# Patient Record
Sex: MALE | Race: BLACK OR AFRICAN AMERICAN | NOT HISPANIC OR LATINO | URBAN - METROPOLITAN AREA
[De-identification: names, ages, dates, MRNs, and addresses within clinical notes are randomized per-mention and may not be internally consistent; named-entity substitution may affect disease eponyms.]

---

## 2017-06-10 ENCOUNTER — EMERGENCY (EMERGENCY)
Facility: HOSPITAL | Age: 22
LOS: 1 days | Discharge: HOME | End: 2017-06-10
Attending: EMERGENCY MEDICINE
Payer: MEDICAID

## 2017-06-10 VITALS
TEMPERATURE: 97 F | SYSTOLIC BLOOD PRESSURE: 112 MMHG | HEART RATE: 96 BPM | HEIGHT: 76 IN | WEIGHT: 225.09 LBS | OXYGEN SATURATION: 98 % | RESPIRATION RATE: 16 BRPM | DIASTOLIC BLOOD PRESSURE: 68 MMHG

## 2017-06-10 VITALS
RESPIRATION RATE: 16 BRPM | DIASTOLIC BLOOD PRESSURE: 87 MMHG | HEART RATE: 68 BPM | TEMPERATURE: 99 F | SYSTOLIC BLOOD PRESSURE: 149 MMHG | OXYGEN SATURATION: 99 %

## 2017-06-10 DIAGNOSIS — G40.909 EPILEPSY, UNSPECIFIED, NOT INTRACTABLE, WITHOUT STATUS EPILEPTICUS: ICD-10-CM

## 2017-06-10 DIAGNOSIS — Z79.899 OTHER LONG TERM (CURRENT) DRUG THERAPY: ICD-10-CM

## 2017-06-10 DIAGNOSIS — R56.9 UNSPECIFIED CONVULSIONS: ICD-10-CM

## 2017-06-10 LAB
ALBUMIN SERPL ELPH-MCNC: 4.9 G/DL — SIGNIFICANT CHANGE UP (ref 3.3–5.2)
ALP SERPL-CCNC: 110 U/L — SIGNIFICANT CHANGE UP (ref 40–120)
ALT FLD-CCNC: 37 U/L — SIGNIFICANT CHANGE UP
AMPHET UR-MCNC: NEGATIVE — SIGNIFICANT CHANGE UP
ANION GAP SERPL CALC-SCNC: 15 MMOL/L — SIGNIFICANT CHANGE UP (ref 5–17)
AST SERPL-CCNC: 34 U/L — SIGNIFICANT CHANGE UP
BARBITURATES UR SCN-MCNC: NEGATIVE — SIGNIFICANT CHANGE UP
BENZODIAZ UR-MCNC: NEGATIVE — SIGNIFICANT CHANGE UP
BILIRUB SERPL-MCNC: 0.7 MG/DL — SIGNIFICANT CHANGE UP (ref 0.4–2)
BUN SERPL-MCNC: 7 MG/DL — LOW (ref 8–20)
CALCIUM SERPL-MCNC: 10.4 MG/DL — HIGH (ref 8.6–10.2)
CHLORIDE SERPL-SCNC: 96 MMOL/L — LOW (ref 98–107)
CO2 SERPL-SCNC: 27 MMOL/L — SIGNIFICANT CHANGE UP (ref 22–29)
COCAINE METAB.OTHER UR-MCNC: NEGATIVE — SIGNIFICANT CHANGE UP
COHGB MFR BLDV: 4 % — HIGH (ref 0–1.5)
CREAT SERPL-MCNC: 0.98 MG/DL — SIGNIFICANT CHANGE UP (ref 0.5–1.3)
GLUCOSE SERPL-MCNC: 90 MG/DL — SIGNIFICANT CHANGE UP (ref 70–115)
HCT VFR BLD CALC: 49.8 % — SIGNIFICANT CHANGE UP (ref 42–52)
HGB BLD CALC-MCNC: 15.7 G/DL — SIGNIFICANT CHANGE UP (ref 13–17)
HGB BLD-MCNC: 16.4 G/DL — SIGNIFICANT CHANGE UP (ref 14–18)
MCHC RBC-ENTMCNC: 27.2 PG — SIGNIFICANT CHANGE UP (ref 27–31)
MCHC RBC-ENTMCNC: 32.9 G/DL — SIGNIFICANT CHANGE UP (ref 32–36)
MCV RBC AUTO: 82.7 FL — SIGNIFICANT CHANGE UP (ref 80–94)
METHADONE UR-MCNC: NEGATIVE — SIGNIFICANT CHANGE UP
OPIATES UR-MCNC: NEGATIVE — SIGNIFICANT CHANGE UP
PCP SPEC-MCNC: SIGNIFICANT CHANGE UP
PCP UR-MCNC: NEGATIVE — SIGNIFICANT CHANGE UP
PLATELET # BLD AUTO: 224 K/UL — SIGNIFICANT CHANGE UP (ref 150–400)
POTASSIUM SERPL-MCNC: 4.4 MMOL/L — SIGNIFICANT CHANGE UP (ref 3.5–5.3)
POTASSIUM SERPL-SCNC: 4.4 MMOL/L — SIGNIFICANT CHANGE UP (ref 3.5–5.3)
PROT SERPL-MCNC: 8.4 G/DL — SIGNIFICANT CHANGE UP (ref 6.6–8.7)
RBC # BLD: 6.02 M/UL — SIGNIFICANT CHANGE UP (ref 4.6–6.2)
RBC # FLD: 13 % — SIGNIFICANT CHANGE UP (ref 11–15.6)
SODIUM SERPL-SCNC: 138 MMOL/L — SIGNIFICANT CHANGE UP (ref 135–145)
THC UR QL: POSITIVE
WBC # BLD: 6.2 K/UL — SIGNIFICANT CHANGE UP (ref 4.8–10.8)
WBC # FLD AUTO: 6.2 K/UL — SIGNIFICANT CHANGE UP (ref 4.8–10.8)

## 2017-06-10 PROCEDURE — 85027 COMPLETE CBC AUTOMATED: CPT

## 2017-06-10 PROCEDURE — 80053 COMPREHEN METABOLIC PANEL: CPT

## 2017-06-10 PROCEDURE — 99284 EMERGENCY DEPT VISIT MOD MDM: CPT

## 2017-06-10 PROCEDURE — 99284 EMERGENCY DEPT VISIT MOD MDM: CPT | Mod: 25

## 2017-06-10 PROCEDURE — 80307 DRUG TEST PRSMV CHEM ANLYZR: CPT

## 2017-06-10 PROCEDURE — 96374 THER/PROPH/DIAG INJ IV PUSH: CPT

## 2017-06-10 PROCEDURE — 36415 COLL VENOUS BLD VENIPUNCTURE: CPT

## 2017-06-10 PROCEDURE — 82375 ASSAY CARBOXYHB QUANT: CPT

## 2017-06-10 RX ORDER — LEVETIRACETAM 250 MG/1
1000 TABLET, FILM COATED ORAL ONCE
Qty: 0 | Refills: 0 | Status: COMPLETED | OUTPATIENT
Start: 2017-06-10 | End: 2017-06-10

## 2017-06-10 RX ORDER — IBUPROFEN 200 MG
600 TABLET ORAL ONCE
Qty: 0 | Refills: 0 | Status: DISCONTINUED | OUTPATIENT
Start: 2017-06-10 | End: 2017-06-14

## 2017-06-10 RX ORDER — LEVETIRACETAM 250 MG/1
1 TABLET, FILM COATED ORAL
Qty: 30 | Refills: 0 | OUTPATIENT
Start: 2017-06-10 | End: 2017-07-10

## 2017-06-10 RX ORDER — SODIUM CHLORIDE 9 MG/ML
3 INJECTION INTRAMUSCULAR; INTRAVENOUS; SUBCUTANEOUS EVERY 8 HOURS
Qty: 0 | Refills: 0 | Status: DISCONTINUED | OUTPATIENT
Start: 2017-06-10 | End: 2017-06-14

## 2017-06-10 RX ORDER — ACETAMINOPHEN 500 MG
975 TABLET ORAL ONCE
Qty: 0 | Refills: 0 | Status: COMPLETED | OUTPATIENT
Start: 2017-06-10 | End: 2017-06-10

## 2017-06-10 RX ADMIN — LEVETIRACETAM 400 MILLIGRAM(S): 250 TABLET, FILM COATED ORAL at 13:46

## 2017-06-10 RX ADMIN — Medication 975 MILLIGRAM(S): at 12:20

## 2017-06-10 RX ADMIN — Medication 975 MILLIGRAM(S): at 13:37

## 2017-06-10 RX ADMIN — SODIUM CHLORIDE 3 MILLILITER(S): 9 INJECTION INTRAMUSCULAR; INTRAVENOUS; SUBCUTANEOUS at 13:46
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
## 2017-06-10 NOTE — ED PROVIDER NOTE - OBJECTIVE STATEMENT
21 yo male s/p reported seizure activity at home,was found next to bed postical, pt not sure what happened sttes he is compliant with meds but then states hasn't had meds 2 weeks no other complaints   denies cp/ha/abd pain or drug/etoh use

## 2017-06-10 NOTE — ED ADULT NURSE NOTE - OBJECTIVE STATEMENT
22 year old male BIBA from home for seizure. Pt is in no acute distress, A & O X 3, reports he "is trying to get Medicaid straightened out" and has been taking Keppra sporadically in order to make it last longer. Pt reports he gets a sense of when he will have a seizure and believed he felt that last night prior to falling asleep and thought about taking his Keppra. Pt does not recall event, denies pain, denies injury but notices superficial abrasion to Left upper arm and 2 linear superficial abrasions near Left AC. Fall precautions in place, warm blankets provided, will monitor.

## 2017-07-30 ENCOUNTER — INPATIENT (INPATIENT)
Facility: HOSPITAL | Age: 22
LOS: 4 days | Discharge: HOME | DRG: 101 | End: 2017-08-04
Attending: INTERNAL MEDICINE | Admitting: FAMILY MEDICINE
Payer: MEDICAID

## 2017-07-30 VITALS
HEART RATE: 93 BPM | DIASTOLIC BLOOD PRESSURE: 76 MMHG | SYSTOLIC BLOOD PRESSURE: 113 MMHG | TEMPERATURE: 100 F | HEIGHT: 77 IN | OXYGEN SATURATION: 99 % | WEIGHT: 240.08 LBS | RESPIRATION RATE: 18 BRPM

## 2017-07-30 NOTE — ED PROVIDER NOTE - OBJECTIVE STATEMENT
21 y/o M pt with hx of seizures presents to ED s/p 4 full body  seizures today. Pt has not been taking his Keppra on daily bases. Last dose today. pt states his medication hasn't been working. per girlfriend pt has left sided facial droop and cotton mouth. Post ictal symptoms vomiting.  denies fever. denies HA or neck pain. no chest pain or sob. no abd pain. no diarrhea no urinary f/u/d. no back pain. no motor or sensory deficits. denies drug use. no recent travel. no rash. no other acute issues symptoms or concerns   [facility]

## 2017-07-30 NOTE — ED PROVIDER NOTE - NEUROLOGICAL, MLM
visible left sided facial droop. EOMI, PERRL, no papilledema, 5/5 muscle strength x 4 extremities, no sensory deficits, 2+ dtr globally, negative babinski, no ataxic gait, normal JYTOI and FNT, normal romberg

## 2017-07-31 DIAGNOSIS — R56.9 UNSPECIFIED CONVULSIONS: ICD-10-CM

## 2017-07-31 LAB
AMPHET UR-MCNC: NEGATIVE — SIGNIFICANT CHANGE UP
ANION GAP SERPL CALC-SCNC: 15 MMOL/L — SIGNIFICANT CHANGE UP (ref 5–17)
ANION GAP SERPL CALC-SCNC: 18 MMOL/L — HIGH (ref 5–17)
APPEARANCE UR: CLEAR — SIGNIFICANT CHANGE UP
BACTERIA # UR AUTO: ABNORMAL
BARBITURATES UR SCN-MCNC: NEGATIVE — SIGNIFICANT CHANGE UP
BASOPHILS # BLD AUTO: 0 K/UL — SIGNIFICANT CHANGE UP (ref 0–0.2)
BASOPHILS NFR BLD AUTO: 0.1 % — SIGNIFICANT CHANGE UP (ref 0–2)
BENZODIAZ UR-MCNC: NEGATIVE — SIGNIFICANT CHANGE UP
BILIRUB UR-MCNC: NEGATIVE — SIGNIFICANT CHANGE UP
BUN SERPL-MCNC: 11 MG/DL — SIGNIFICANT CHANGE UP (ref 8–20)
BUN SERPL-MCNC: 8 MG/DL — SIGNIFICANT CHANGE UP (ref 8–20)
CALCIUM SERPL-MCNC: 8.5 MG/DL — LOW (ref 8.6–10.2)
CALCIUM SERPL-MCNC: 9.7 MG/DL — SIGNIFICANT CHANGE UP (ref 8.6–10.2)
CHLORIDE SERPL-SCNC: 103 MMOL/L — SIGNIFICANT CHANGE UP (ref 98–107)
CHLORIDE SERPL-SCNC: 97 MMOL/L — LOW (ref 98–107)
CK MB CFR SERPL CALC: 5.4 NG/ML — SIGNIFICANT CHANGE UP (ref 0–6.7)
CK MB CFR SERPL CALC: 6.7 NG/ML — SIGNIFICANT CHANGE UP (ref 0–6.7)
CK SERPL-CCNC: HIGH U/L (ref 30–200)
CK SERPL-CCNC: HIGH U/L (ref 30–200)
CO2 SERPL-SCNC: 24 MMOL/L — SIGNIFICANT CHANGE UP (ref 22–29)
CO2 SERPL-SCNC: 25 MMOL/L — SIGNIFICANT CHANGE UP (ref 22–29)
COCAINE METAB.OTHER UR-MCNC: NEGATIVE — SIGNIFICANT CHANGE UP
COLOR SPEC: YELLOW — SIGNIFICANT CHANGE UP
CREAT SERPL-MCNC: 0.95 MG/DL — SIGNIFICANT CHANGE UP (ref 0.5–1.3)
CREAT SERPL-MCNC: 1.01 MG/DL — SIGNIFICANT CHANGE UP (ref 0.5–1.3)
DIFF PNL FLD: NEGATIVE — SIGNIFICANT CHANGE UP
EOSINOPHIL # BLD AUTO: 0 K/UL — SIGNIFICANT CHANGE UP (ref 0–0.5)
EOSINOPHIL NFR BLD AUTO: 0.1 % — SIGNIFICANT CHANGE UP (ref 0–5)
EPI CELLS # UR: SIGNIFICANT CHANGE UP
GLUCOSE SERPL-MCNC: 101 MG/DL — SIGNIFICANT CHANGE UP (ref 70–115)
GLUCOSE SERPL-MCNC: 122 MG/DL — HIGH (ref 70–115)
GLUCOSE UR QL: NEGATIVE MG/DL — SIGNIFICANT CHANGE UP
HCT VFR BLD CALC: 45.7 % — SIGNIFICANT CHANGE UP (ref 42–52)
HGB BLD-MCNC: 15.3 G/DL — SIGNIFICANT CHANGE UP (ref 14–18)
KETONES UR-MCNC: ABNORMAL
LEUKOCYTE ESTERASE UR-ACNC: ABNORMAL
LYMPHOCYTES # BLD AUTO: 1.7 K/UL — SIGNIFICANT CHANGE UP (ref 1–4.8)
LYMPHOCYTES # BLD AUTO: 19.2 % — LOW (ref 20–55)
MAGNESIUM SERPL-MCNC: 1.8 MG/DL — SIGNIFICANT CHANGE UP (ref 1.6–2.6)
MCHC RBC-ENTMCNC: 27.3 PG — SIGNIFICANT CHANGE UP (ref 27–31)
MCHC RBC-ENTMCNC: 33.5 G/DL — SIGNIFICANT CHANGE UP (ref 32–36)
MCV RBC AUTO: 81.5 FL — SIGNIFICANT CHANGE UP (ref 80–94)
METHADONE UR-MCNC: NEGATIVE — SIGNIFICANT CHANGE UP
MONOCYTES # BLD AUTO: 0.8 K/UL — SIGNIFICANT CHANGE UP (ref 0–0.8)
MONOCYTES NFR BLD AUTO: 9 % — SIGNIFICANT CHANGE UP (ref 3–10)
NEUTROPHILS # BLD AUTO: 6.2 K/UL — SIGNIFICANT CHANGE UP (ref 1.8–8)
NEUTROPHILS NFR BLD AUTO: 71.4 % — SIGNIFICANT CHANGE UP (ref 37–73)
NITRITE UR-MCNC: NEGATIVE — SIGNIFICANT CHANGE UP
OPIATES UR-MCNC: NEGATIVE — SIGNIFICANT CHANGE UP
PCP SPEC-MCNC: SIGNIFICANT CHANGE UP
PCP UR-MCNC: NEGATIVE — SIGNIFICANT CHANGE UP
PH UR: 6 — SIGNIFICANT CHANGE UP (ref 5–8)
PHOSPHATE SERPL-MCNC: 2.9 MG/DL — SIGNIFICANT CHANGE UP (ref 2.4–4.7)
PLATELET # BLD AUTO: 205 K/UL — SIGNIFICANT CHANGE UP (ref 150–400)
POTASSIUM SERPL-MCNC: 3.3 MMOL/L — LOW (ref 3.5–5.3)
POTASSIUM SERPL-MCNC: 3.7 MMOL/L — SIGNIFICANT CHANGE UP (ref 3.5–5.3)
POTASSIUM SERPL-SCNC: 3.3 MMOL/L — LOW (ref 3.5–5.3)
POTASSIUM SERPL-SCNC: 3.7 MMOL/L — SIGNIFICANT CHANGE UP (ref 3.5–5.3)
PROT UR-MCNC: 30 MG/DL
RBC # BLD: 5.61 M/UL — SIGNIFICANT CHANGE UP (ref 4.6–6.2)
RBC # FLD: 13.3 % — SIGNIFICANT CHANGE UP (ref 11–15.6)
RBC CASTS # UR COMP ASSIST: SIGNIFICANT CHANGE UP /HPF (ref 0–4)
SODIUM SERPL-SCNC: 140 MMOL/L — SIGNIFICANT CHANGE UP (ref 135–145)
SODIUM SERPL-SCNC: 142 MMOL/L — SIGNIFICANT CHANGE UP (ref 135–145)
SP GR SPEC: 1.02 — SIGNIFICANT CHANGE UP (ref 1.01–1.02)
THC UR QL: POSITIVE
UROBILINOGEN FLD QL: 4 MG/DL
WBC # BLD: 8.8 K/UL — SIGNIFICANT CHANGE UP (ref 4.8–10.8)
WBC # FLD AUTO: 8.8 K/UL — SIGNIFICANT CHANGE UP (ref 4.8–10.8)
WBC UR QL: SIGNIFICANT CHANGE UP

## 2017-07-31 PROCEDURE — 99223 1ST HOSP IP/OBS HIGH 75: CPT

## 2017-07-31 PROCEDURE — 70450 CT HEAD/BRAIN W/O DYE: CPT | Mod: 26

## 2017-07-31 PROCEDURE — 12345: CPT | Mod: NC

## 2017-07-31 PROCEDURE — 99285 EMERGENCY DEPT VISIT HI MDM: CPT

## 2017-07-31 PROCEDURE — 93010 ELECTROCARDIOGRAM REPORT: CPT

## 2017-07-31 RX ORDER — LEVETIRACETAM 250 MG/1
1000 TABLET, FILM COATED ORAL ONCE
Qty: 0 | Refills: 0 | Status: COMPLETED | OUTPATIENT
Start: 2017-07-31 | End: 2017-07-31

## 2017-07-31 RX ORDER — LEVETIRACETAM 250 MG/1
0 TABLET, FILM COATED ORAL
Qty: 0 | Refills: 0 | COMMUNITY

## 2017-07-31 RX ORDER — ACETAMINOPHEN 500 MG
650 TABLET ORAL EVERY 6 HOURS
Qty: 0 | Refills: 0 | Status: DISCONTINUED | OUTPATIENT
Start: 2017-07-31 | End: 2017-08-04

## 2017-07-31 RX ORDER — ONDANSETRON 8 MG/1
4 TABLET, FILM COATED ORAL ONCE
Qty: 0 | Refills: 0 | Status: DISCONTINUED | OUTPATIENT
Start: 2017-07-31 | End: 2017-08-04

## 2017-07-31 RX ORDER — SODIUM CHLORIDE 9 MG/ML
1000 INJECTION INTRAMUSCULAR; INTRAVENOUS; SUBCUTANEOUS
Qty: 0 | Refills: 0 | Status: DISCONTINUED | OUTPATIENT
Start: 2017-07-31 | End: 2017-08-04

## 2017-07-31 RX ORDER — SODIUM CHLORIDE 9 MG/ML
1000 INJECTION INTRAMUSCULAR; INTRAVENOUS; SUBCUTANEOUS ONCE
Qty: 0 | Refills: 0 | Status: COMPLETED | OUTPATIENT
Start: 2017-07-31 | End: 2017-07-31

## 2017-07-31 RX ORDER — POTASSIUM CHLORIDE 20 MEQ
40 PACKET (EA) ORAL ONCE
Qty: 0 | Refills: 0 | Status: COMPLETED | OUTPATIENT
Start: 2017-07-31 | End: 2017-07-31

## 2017-07-31 RX ORDER — LEVETIRACETAM 250 MG/1
500 TABLET, FILM COATED ORAL
Qty: 0 | Refills: 0 | Status: DISCONTINUED | OUTPATIENT
Start: 2017-07-31 | End: 2017-08-01

## 2017-07-31 RX ORDER — HALOPERIDOL DECANOATE 100 MG/ML
5 INJECTION INTRAMUSCULAR ONCE
Qty: 0 | Refills: 0 | Status: COMPLETED | OUTPATIENT
Start: 2017-07-31 | End: 2017-07-31

## 2017-07-31 RX ADMIN — SODIUM CHLORIDE 3000 MILLILITER(S): 9 INJECTION INTRAMUSCULAR; INTRAVENOUS; SUBCUTANEOUS at 01:30

## 2017-07-31 RX ADMIN — HALOPERIDOL DECANOATE 5 MILLIGRAM(S): 100 INJECTION INTRAMUSCULAR at 20:40

## 2017-07-31 RX ADMIN — Medication 40 MILLIEQUIVALENT(S): at 11:20

## 2017-07-31 RX ADMIN — LEVETIRACETAM 500 MILLIGRAM(S): 250 TABLET, FILM COATED ORAL at 05:36

## 2017-07-31 RX ADMIN — SODIUM CHLORIDE 250 MILLILITER(S): 9 INJECTION INTRAMUSCULAR; INTRAVENOUS; SUBCUTANEOUS at 21:39

## 2017-07-31 RX ADMIN — LEVETIRACETAM 500 MILLIGRAM(S): 250 TABLET, FILM COATED ORAL at 19:18

## 2017-07-31 RX ADMIN — Medication 2 MILLIGRAM(S): at 20:40

## 2017-07-31 RX ADMIN — SODIUM CHLORIDE 250 MILLILITER(S): 9 INJECTION INTRAMUSCULAR; INTRAVENOUS; SUBCUTANEOUS at 05:36

## 2017-07-31 RX ADMIN — Medication 2 MILLIGRAM(S): at 20:35

## 2017-07-31 RX ADMIN — SODIUM CHLORIDE 250 MILLILITER(S): 9 INJECTION INTRAMUSCULAR; INTRAVENOUS; SUBCUTANEOUS at 10:28

## 2017-07-31 RX ADMIN — SODIUM CHLORIDE 250 MILLILITER(S): 9 INJECTION INTRAMUSCULAR; INTRAVENOUS; SUBCUTANEOUS at 04:25

## 2017-07-31 RX ADMIN — SODIUM CHLORIDE 3000 MILLILITER(S): 9 INJECTION INTRAMUSCULAR; INTRAVENOUS; SUBCUTANEOUS at 02:10

## 2017-07-31 RX ADMIN — LEVETIRACETAM 400 MILLIGRAM(S): 250 TABLET, FILM COATED ORAL at 01:38

## 2017-07-31 NOTE — ED ADULT NURSE REASSESSMENT NOTE - NS ED NURSE REASSESS COMMENT FT1
Dr. Wilson @ pts bedside and pt willing to stay for neurologist to assess. NS running @ 250mL/hr.  Will continue to monitor.

## 2017-07-31 NOTE — ED ADULT NURSE REASSESSMENT NOTE - NS ED NURSE REASSESS COMMENT FT1
Pt stating he wants to leave to go home. Dr. Wilson made and aware and coming to pts bedside to discuss.

## 2017-07-31 NOTE — H&P ADULT - NSHPLABSRESULTS_GEN_ALL_CORE
LABS:                        15.3   8.8   )-----------( 205      ( 31 Jul 2017 00:14 )             45.7     07-31    140  |  97<L>  |  11.0  ----------------------------<  122<H>  3.7   |  25.0  |  1.01    Ca    9.7      31 Jul 2017 00:14        CARDIAC MARKERS ( 31 Jul 2017 00:14 )  x     / x     / >29279 U/L / x     / 6.7 ng/mL LABS:                        15.3   8.8   )-----------( 205      ( 31 Jul 2017 00:14 )             45.7     07-31    140  |  97<L>  |  11.0  ----------------------------<  122<H>  3.7   |  25.0  |  1.01    Ca    9.7      31 Jul 2017 00:14        CARDIAC MARKERS ( 31 Jul 2017 00:14 )  x     / x     / >64060 U/L / x     / 6.7 ng/mL    EKG: NSR at 77 bpm. Normal axis. No acute ST changes.

## 2017-07-31 NOTE — PROGRESS NOTE ADULT - ASSESSMENT
> Seizure disorder - Neurology input appreciated.  MRI.  Keppra 1000mg q12h.  > Poor compliance - discussed extensively with patient and family.  > Rhabdomyolysis - CPK trending down.  Monitor Renal function.  Continue IVF.  > Hypokalemia - supplement lytes.   > DVT Prophylaxis - OOB
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
## 2017-07-31 NOTE — H&P ADULT - ASSESSMENT
22 years old male with PMH of Seizure Disorder brought by girlfriend with seizures. As per patient's girlfriend, patient was laying in couch around 3:38 pm when he had a seizure which lasted for 3 minutes. He went to sleep afterwards. Around 7:02 pm, he had a second seizure with vomiting. He was confused during that time. Around 9 pm, he had third seizure associated with urinary + fecal incontinence and foaming around mouth. Around 9:15 pm, he had similar fourth seizure so she brought him to the hospital. As per her, during seizures - patient becomes stiff and his whole body shakes. He did not hit his head or injure any part of body. No tongue bite. No fever, sick contacts or recent travel.   Patient is not taking his medications regularly. He uses Marijuana on regular basis (4-5 blunts every day since the age of 16 years).   He is active and alert but still confused and postictal at this time.    1) Seizures  - Likely secondary to non compliance  - Seizure, aspiration and fall precautions  - Restart Keppra XR 1000 mg daily. Will titrate dose.  - Ativan PRN  - Neurology Consult  2) Rhabdomyolysis  - IVF  - Monitor renal function  3) Marijuana Use  - Tox screen  -  consult  DVT Prophylaxis -- IPC 22 years old male with PMH of Seizure Disorder brought by girlfriend with seizures. As per patient's girlfriend, patient was laying in couch around 3:38 pm when he had a seizure which lasted for 3 minutes. He went to sleep afterwards. Around 7:02 pm, he had a second seizure with vomiting. He was confused during that time. Around 9 pm, he had third seizure associated with urinary + fecal incontinence and foaming around mouth. Around 9:15 pm, he had similar fourth seizure so she brought him to the hospital. As per her, during seizures - patient becomes stiff and his whole body shakes. He did not hit his head or injure any part of body. No tongue bite. No fever, sick contacts or recent travel.   Patient is not taking his medications regularly. He uses Marijuana on regular basis (4-5 blunts every day since the age of 16 years).   He is active and alert but still confused and postictal at this time.    1) Seizures  - Likely secondary to non compliance  - Seizure, aspiration and fall precautions  - Restart Keppra XR 1000 mg daily. Will titrate dose.  - Ativan PRN  - Neurology Consult. Patient does not remember his neurologist. Will call on call neurologist.   2) Rhabdomyolysis  - IVF  - Monitor renal function  3) Marijuana Use  - Tox screen  -  consult  DVT Prophylaxis -- IPC

## 2017-07-31 NOTE — ED ADULT NURSE REASSESSMENT NOTE - NS ED NURSE REASSESS COMMENT FT1
ptn sedidated patient unable to follow commands patient placed in gown and verbal report given to Real RN and patient transported to MICU with security and Nursing Supervisior making arrangments for !:1

## 2017-07-31 NOTE — CONSULT NOTE ADULT - PROBLEM SELECTOR RECOMMENDATION 9
Increase Keppra 1000mg po q12.  Maintain seizure precaution.   MRI Brain with seizure protocol.  DVT prophylaxis.  No driving for now.  D/w pt in great detail.  Will follow.

## 2017-07-31 NOTE — ED ADULT NURSE REASSESSMENT NOTE - NS ED NURSE REASSESS COMMENT FT1
Pt is resting comfortably in NAD resp even and unlabored. Pt denies complaint.  IV clean dry and intact, running without difficulty. Safety maintained, Bed locked in lowest position. Pt awaiting bed placement. Pt aware of the plan of care. Will continue to monitor.
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
## 2017-07-31 NOTE — H&P ADULT - HISTORY OF PRESENT ILLNESS
22 years old male with PMH of Seizure Disorder brought by girlfriend with seizures. As per patient's girlfriend, patient was laying in couch around 3:38 pm when he had a seizure which lasted for 3 minutes. He went to sleep afterwards. Around 7:02 pm, he had a second seizure with vomiting. He was confused during that time. Around 9 pm, he had third seizure associated with urinary + fecal incontinence and foaming around mouth. Around 9:15 pm, he had similar fourth seizure so she brought him to the hospital. As per her, during seizures - patient becomes stiff and his whole body shakes. He did not hit his head or injure any part of body. No tongue bite. No fever, sick contacts or recent travel.   Patient is not taking his medications regularly. He uses Marijuana on regular basis (4-5 blunts every day since the age of 16 years).   He is active and alert but still confused and postictal at this time.

## 2017-07-31 NOTE — ED ADULT NURSE REASSESSMENT NOTE - NS ED NURSE REASSESS COMMENT FT1
pt. a&o2. pt. comes to ed for multiple seizures yesterday. as per. gf pt. is non compliant with medication. pt. wants to sign AMA.

## 2017-07-31 NOTE — H&P ADULT - NSHPPHYSICALEXAM_GEN_ALL_CORE
Vital Signs   T(C): 36.9 (31 Jul 2017 00:09), Max: 37.9 (30 Jul 2017 21:51)  T(F): 98.5 (31 Jul 2017 00:09), Max: 100.2 (30 Jul 2017 21:51)  HR: 93 (30 Jul 2017 21:51) (93 - 93)  BP: 113/76 (30 Jul 2017 21:51) (113/76 - 113/76)  RR: 18 (30 Jul 2017 21:51) (18 - 18)  SpO2: 99% (30 Jul 2017 21:51) (99% - 99%)  General: Well developed. Well nourished. No acute distress  HEENT: PERRLA. EOMI. Clear conjunctivae. Moist mucus membrane. No tongue bite.  Neck: Supple. No JVD. No Thyromegaly   Chest: CTA bilaterally - no wheezing, rales or rhonchi. No chest wall tenderness.  Heart: Normal S1 & S2 with RRR. No murmur.   Abdomen: Soft. Non-tender. Non-distended. + BS  Ext: No pedal edema. No calf tenderness   Neuro: AAO x 3 but slightly confused. No focal deficit, CN II-XII grossly WNL and no speech disorder. Facial asymmetry (? left facial droop)  Skin: Warm and Dry Vital Signs   T(C): 36.9 (31 Jul 2017 00:09), Max: 37.9 (30 Jul 2017 21:51)  T(F): 98.5 (31 Jul 2017 00:09), Max: 100.2 (30 Jul 2017 21:51)  HR: 93 (30 Jul 2017 21:51) (93 - 93)  BP: 113/76 (30 Jul 2017 21:51) (113/76 - 113/76)  RR: 18 (30 Jul 2017 21:51) (18 - 18)  SpO2: 99% (30 Jul 2017 21:51) (99% - 99%)  General: Well developed. Well nourished. No acute distress  HEENT: PERRLA. EOMI. Clear conjunctivae. Moist mucus membrane. No tongue bite.  Neck: Supple. No JVD. No Thyromegaly   Chest: CTA bilaterally - no wheezing, rales or rhonchi. No chest wall tenderness.  Heart: Normal S1 & S2 with RRR. No murmur.   Abdomen: Soft. Non-tender. Non-distended. + BS  Ext: No pedal edema. No calf tenderness   Neuro: AAO x 3 but slightly confused. No focal deficit, CN II-XII grossly WNL and no speech disorder. Facial asymmetry (? left facial droop)  Skin: Warm and Dry. Superficial skin lacerations on left hand, right knee and right shoulder secondary to sports.

## 2017-07-31 NOTE — H&P ADULT - NSHPSOCIALHISTORY_GEN_ALL_CORE
Unemployed.  Lives with girlfriend.  Smokes cigarettes - 1 pack per month.   Smokes marijuana - 4-5 blunts daily since the age of 16 years.  Denies alcohol use.

## 2017-07-31 NOTE — PROGRESS NOTE ADULT - SUBJECTIVE AND OBJECTIVE BOX
Patient: JETHRO SPANN [id number] 22y Male                 Internal Medicine Hospitalist Progress Note - Dr. Eric Beaulieu    Chief Complaint: Patient is a 22y old  Male who presents with a chief complaint of Seizures (2017 02:11)    HPI:  22 years old male with PMH of Seizure Disorder, poor compliance, brought in to ED by girlfriend due to recurrent seizures.  Patient admits to poor compliance with antiepileptic drugs.  Also has been using Marijuana on regular basis (4-5 blunts every day since the age of 16 years).     Seen with girlfriend and mother at bedside.  Alert, but still confused.      ____________________PHYSICAL EXAM:  Vitals reviewed as indicated below  GENERAL:  NAD Arousable, oriented to person, place.   HEENT: NCAT  CARDIOVASCULAR:  S1, S2  LUNGS: CTAB  ABDOMEN:  soft, (-) tenderness, (-) distension, (+) bowel sounds, (-) guarding, (-) rebound (-) rigidity  EXTREMITIES:  no cyanosis / clubbing / edema.   NEURO: Strength symmetric.   ____________________    BACKGROUND:  HEALTH ISSUES - PROBLEM Dx:  Seizures: Seizures        MEDICATIONS  (STANDING):  sodium chloride 0.9%. 1000 milliLiter(s) (250 mL/Hr) IV Continuous <Continuous>  levETIRAcetam 500 milliGRAM(s) Oral two times a day    MEDICATIONS  (PRN):  acetaminophen   Tablet. 650 milliGRAM(s) Oral every 6 hours PRN Headache or Bodyache  LORazepam   Injectable 2 milliGRAM(s) IV Push every 20 minutes PRN Seizure    Allergies    No Known Allergies    Intolerances      PAST MEDICAL & SURGICAL HISTORY:  Concussion  Seizures  No significant past surgical history      VITALS:  Vital Signs Last 24 Hrs  T(C): 37.1 (2017 12:45), Max: 37.9 (2017 21:51)  T(F): 98.7 (2017 12:45), Max: 100.2 (2017 21:51)  HR: 73 (2017 12:45) (73 - 93)  BP: 160/98 (2017 12:45) (113/76 - 166/95)  BP(mean): --  RR: 16 (2017 12:45) (16 - 18)  SpO2: 100% (2017 12:45) (96% - 100%) Daily Height in cm: 195.58 (2017 21:51)    Daily   CAPILLARY BLOOD GLUCOSE        I&O's Summary      LABS:                          15.3   8.8   )-----------( 205      ( 2017 00:14 )             45.7     07-31    142  |  103  |  8.0  ----------------------------<  101  3.3<L>   |  24.0  |  0.95    Ca    8.5<L>      2017 08:18  Phos  2.9     -31  Mg     1.8     -31        RECENT CULTURES:        Urinalysis Basic - ( 2017 05:23 )    Color: Yellow / Appearance: Clear / S.020 / pH: x  Gluc: x / Ketone: Trace  / Bili: Negative / Urobili: 4 mg/dL   Blood: x / Protein: 30 mg/dL / Nitrite: Negative   Leuk Esterase: Trace / RBC: 0-2 /HPF / WBC 3-5   Sq Epi: x / Non Sq Epi: x / Bacteria: x      CARDIAC MARKERS ( 2017 08:18 )  x     / x     / 81476 U/L / x     / 5.4 ng/mL  CARDIAC MARKERS ( 2017 00:14 )  x     / x     / >14248 U/L / x     / 6.7 ng/mL

## 2017-07-31 NOTE — ED ADULT NURSE REASSESSMENT NOTE - NS ED NURSE REASSESS COMMENT FT1
pt. wants to sign out AMA. MD. Brittnee called to make aware. MD. wants pt. to be seen by neuro. as per MD. he will be down to see him shortly. will continue to monitor.

## 2017-07-31 NOTE — ED ADULT NURSE REASSESSMENT NOTE - NS ED NURSE REASSESS COMMENT FT1
Pt assisted to the bathroom and states he's dizzy. Pt assisted back into bed and re-hooked up to IVF. Pt appears to still be postictal very slow in responding. Will continue to monitor.

## 2017-07-31 NOTE — ED ADULT NURSE REASSESSMENT NOTE - NS ED NURSE REASSESS COMMENT FT1
patient rec'd at this itme patient alert and stating feeling confused. patient stats unable to remenber anything  patient with family members at bedside. patient remains on monitor.  patient would not let me take his temp. would agree to other vital signs IV to right ac intact. Pa called to inform that patient has been vomitting and will order mediation,
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
## 2017-08-01 DIAGNOSIS — F05 DELIRIUM DUE TO KNOWN PHYSIOLOGICAL CONDITION: ICD-10-CM

## 2017-08-01 LAB
ANION GAP SERPL CALC-SCNC: 12 MMOL/L — SIGNIFICANT CHANGE UP (ref 5–17)
BUN SERPL-MCNC: 6 MG/DL — LOW (ref 8–20)
CALCIUM SERPL-MCNC: 8.7 MG/DL — SIGNIFICANT CHANGE UP (ref 8.6–10.2)
CHLORIDE SERPL-SCNC: 107 MMOL/L — SIGNIFICANT CHANGE UP (ref 98–107)
CK MB CFR SERPL CALC: 8.2 NG/ML — HIGH (ref 0–6.7)
CK SERPL-CCNC: HIGH U/L (ref 30–200)
CO2 SERPL-SCNC: 23 MMOL/L — SIGNIFICANT CHANGE UP (ref 22–29)
CREAT SERPL-MCNC: 0.88 MG/DL — SIGNIFICANT CHANGE UP (ref 0.5–1.3)
GLUCOSE SERPL-MCNC: 85 MG/DL — SIGNIFICANT CHANGE UP (ref 70–115)
HCT VFR BLD CALC: 41.7 % — LOW (ref 42–52)
HGB BLD-MCNC: 13.5 G/DL — LOW (ref 14–18)
MCHC RBC-ENTMCNC: 26.4 PG — LOW (ref 27–31)
MCHC RBC-ENTMCNC: 32.4 G/DL — SIGNIFICANT CHANGE UP (ref 32–36)
MCV RBC AUTO: 81.4 FL — SIGNIFICANT CHANGE UP (ref 80–94)
PLATELET # BLD AUTO: 171 K/UL — SIGNIFICANT CHANGE UP (ref 150–400)
POTASSIUM SERPL-MCNC: 3.8 MMOL/L — SIGNIFICANT CHANGE UP (ref 3.5–5.3)
POTASSIUM SERPL-SCNC: 3.8 MMOL/L — SIGNIFICANT CHANGE UP (ref 3.5–5.3)
RBC # BLD: 5.12 M/UL — SIGNIFICANT CHANGE UP (ref 4.6–6.2)
RBC # FLD: 13.3 % — SIGNIFICANT CHANGE UP (ref 11–15.6)
SODIUM SERPL-SCNC: 142 MMOL/L — SIGNIFICANT CHANGE UP (ref 135–145)
WBC # BLD: 6.6 K/UL — SIGNIFICANT CHANGE UP (ref 4.8–10.8)
WBC # FLD AUTO: 6.6 K/UL — SIGNIFICANT CHANGE UP (ref 4.8–10.8)

## 2017-08-01 PROCEDURE — 99233 SBSQ HOSP IP/OBS HIGH 50: CPT

## 2017-08-01 PROCEDURE — 99223 1ST HOSP IP/OBS HIGH 75: CPT

## 2017-08-01 RX ORDER — LEVETIRACETAM 250 MG/1
1000 TABLET, FILM COATED ORAL
Qty: 0 | Refills: 0 | Status: DISCONTINUED | OUTPATIENT
Start: 2017-08-01 | End: 2017-08-04

## 2017-08-01 RX ADMIN — LEVETIRACETAM 500 MILLIGRAM(S): 250 TABLET, FILM COATED ORAL at 05:26

## 2017-08-01 RX ADMIN — Medication 0.1 MILLIGRAM(S): at 13:08

## 2017-08-01 RX ADMIN — Medication 0.1 MILLIGRAM(S): at 22:11

## 2017-08-01 RX ADMIN — LEVETIRACETAM 1000 MILLIGRAM(S): 250 TABLET, FILM COATED ORAL at 17:41

## 2017-08-01 RX ADMIN — SODIUM CHLORIDE 250 MILLILITER(S): 9 INJECTION INTRAMUSCULAR; INTRAVENOUS; SUBCUTANEOUS at 09:30

## 2017-08-01 NOTE — PROGRESS NOTE ADULT - SUBJECTIVE AND OBJECTIVE BOX
Patient: JETHRO SPANN [id number] 22y Male                 Internal Medicine Hospitalist Progress Note - Dr. Eric Beaulieu    Chief Complaint: Patient is a 22y old  Male who presents with a chief complaint of Seizures (2017 02:11)    HPI:  22 years old male with PMH of Seizure Disorder, poor compliance, brought in to ED by girlfriend due to recurrent seizures.  Patient admits to poor compliance with antiepileptic drugs.  Also has been using Marijuana on regular basis (4-5 blunts every day since the age of 16 years).  Pt seen by Neurology, started on Keppra 1000mg Q12h.  CPK noted 17k.  Given IVF.      Episode of severe agitation overnight.  This am, pt more alert, denies complaints.  1:1 aide at bedside.      ____________________PHYSICAL EXAM:  Vitals reviewed as indicated below  GENERAL:  NAD Alert and Oriented x 3   HEENT: NCAT  CARDIOVASCULAR:  S1, S2  LUNGS: CTAB  ABDOMEN:  soft, (-) tenderness, (-) distension, (+) bowel sounds, (-) guarding, (-) rebound (-) rigidity  EXTREMITIES:  no cyanosis / clubbing / edema.   NEURO: Strength symmetric.   PSYCH: calm, no agitation  ____________________      MEDICATIONS:  sodium chloride 0.9%. 1000 milliLiter(s) IV Continuous <Continuous>  acetaminophen   Tablet. 650 milliGRAM(s) Oral every 6 hours PRN  LORazepam   Injectable 2 milliGRAM(s) IV Push every 20 minutes PRN  ondansetron Injectable 4 milliGRAM(s) IV Push once  LORazepam   Injectable 2 milliGRAM(s) IV Push every 2 hours PRN  levETIRAcetam 1000 milliGRAM(s) Oral two times a day      VITALS:  Vital Signs Last 24 Hrs  T(C): 37.1 (01 Aug 2017 07:00), Max: 37.1 (2017 12:45)  T(F): 98.7 (01 Aug 2017 07:00), Max: 98.7 (2017 12:45)  HR: 74 (01 Aug 2017 10:00) (56 - 93)  BP: 156/97 (01 Aug 2017 10:00) (138/70 - 182/90)  BP(mean): 120 (01 Aug 2017 10:00) (95 - 126)  RR: 17 (01 Aug 2017 10:00) (12 - 21)  SpO2: 100% (01 Aug 2017 10:00) (96% - 100%) Daily     Daily Weight in k (2017 21:51)  CAPILLARY BLOOD GLUCOSE        I&O's Summary    2017 07:  -  01 Aug 2017 07:00  --------------------------------------------------------  IN: 2500 mL / OUT: 1500 mL / NET: 1000 mL    01 Aug 2017 07:01  -  01 Aug 2017 10:51  --------------------------------------------------------  IN: 990 mL / OUT: 1300 mL / NET: -310 mL        LABS:                        13.5   6.6   )-----------( 171      ( 01 Aug 2017 05:14 )             41.7     08-01    142  |  107  |  6.0<L>  ----------------------------<  85  3.8   |  23.0  |  0.88    Ca    8.7      01 Aug 2017 05:14  Phos  2.9     07-31  Mg     1.8     07-31        RECENT CULTURES:        Urinalysis Basic - ( 2017 05:23 )    Color: Yellow / Appearance: Clear / S.020 / pH: x  Gluc: x / Ketone: Trace  / Bili: Negative / Urobili: 4 mg/dL   Blood: x / Protein: 30 mg/dL / Nitrite: Negative   Leuk Esterase: Trace / RBC: 0-2 /HPF / WBC 3-5   Sq Epi: x / Non Sq Epi: x / Bacteria: x      CARDIAC MARKERS ( 01 Aug 2017 05:14 )  x     / x     / 45370 U/L / x     / 8.2 ng/mL  CARDIAC MARKERS ( 2017 08:18 )  x     / x     / 21465 U/L / x     / 5.4 ng/mL  CARDIAC MARKERS ( 2017 00:14 )  x     / x     / >02073 U/L / x     / 6.7 ng/mL

## 2017-08-01 NOTE — PROGRESS NOTE ADULT - PROBLEM SELECTOR PLAN 1
Continue Keppra 1000mg po q12.  No driving for now.  Pt understands our limitations of not doing MRI brain.  Not cooperative at all.  Follow up with primary neurologist.  Reconsult PRN.

## 2017-08-01 NOTE — BEHAVIORAL HEALTH ASSESSMENT NOTE - NSBHCONSULTMEDAGITATION_PSY_A_CORE FT
given elevated creatine kinase, would not recommend haldol or other antipsychotic for management of agitation., if agitated/combative may give depakote 500 mg IV q 6 hours PRN agitation, would avoid benzodiazepines and anticholinergics given delirium  give additional 250 mg IV depakote if unresponsive to initial 500 mg IV.
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
## 2017-08-01 NOTE — BEHAVIORAL HEALTH ASSESSMENT NOTE - PROBLEM SELECTOR PLAN 1
given elevated creatine kinase, would not recommend haldol or other antipsychotic for management of agitation., if agitated/combative may give depakote 500 mg IV q 6 hours PRN agitation, would check LFTs given depakote use  check TSH, B12, folate   agree with MRI brain  discussed recommendations  with Dr. Beaulieu
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
## 2017-08-01 NOTE — PROGRESS NOTE ADULT - ASSESSMENT
> Seizure disorder - Neurology input appreciated.  MRI.  Keppra 1000mg q12h.  > Agitation / Psychosis - now resolved.  Pt denies substance abuse nor previous history of psych disorder.  Psychiatry evaluation.   > Poor compliance - again emphasized compliance.    > Rhabdomyolysis - CPK trending down.  Monitor Renal function.  Continue IVF.  > Hypokalemia - monitor lytes.   > DVT Prophylaxis - OOB
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
## 2017-08-02 LAB
ANION GAP SERPL CALC-SCNC: 14 MMOL/L — SIGNIFICANT CHANGE UP (ref 5–17)
BUN SERPL-MCNC: 6 MG/DL — LOW (ref 8–20)
CALCIUM SERPL-MCNC: 8.3 MG/DL — LOW (ref 8.6–10.2)
CHLORIDE SERPL-SCNC: 106 MMOL/L — SIGNIFICANT CHANGE UP (ref 98–107)
CK SERPL-CCNC: 9821 U/L — HIGH (ref 30–200)
CO2 SERPL-SCNC: 23 MMOL/L — SIGNIFICANT CHANGE UP (ref 22–29)
CREAT SERPL-MCNC: 0.77 MG/DL — SIGNIFICANT CHANGE UP (ref 0.5–1.3)
GLUCOSE SERPL-MCNC: 84 MG/DL — SIGNIFICANT CHANGE UP (ref 70–115)
HCT VFR BLD CALC: 39.5 % — LOW (ref 42–52)
HGB BLD-MCNC: 13 G/DL — LOW (ref 14–18)
MCHC RBC-ENTMCNC: 26.5 PG — LOW (ref 27–31)
MCHC RBC-ENTMCNC: 32.9 G/DL — SIGNIFICANT CHANGE UP (ref 32–36)
MCV RBC AUTO: 80.4 FL — SIGNIFICANT CHANGE UP (ref 80–94)
PLATELET # BLD AUTO: 178 K/UL — SIGNIFICANT CHANGE UP (ref 150–400)
POTASSIUM SERPL-MCNC: 3.2 MMOL/L — LOW (ref 3.5–5.3)
POTASSIUM SERPL-SCNC: 3.2 MMOL/L — LOW (ref 3.5–5.3)
RBC # BLD: 4.91 M/UL — SIGNIFICANT CHANGE UP (ref 4.6–6.2)
RBC # FLD: 13 % — SIGNIFICANT CHANGE UP (ref 11–15.6)
SODIUM SERPL-SCNC: 143 MMOL/L — SIGNIFICANT CHANGE UP (ref 135–145)
WBC # BLD: 5.2 K/UL — SIGNIFICANT CHANGE UP (ref 4.8–10.8)
WBC # FLD AUTO: 5.2 K/UL — SIGNIFICANT CHANGE UP (ref 4.8–10.8)

## 2017-08-02 PROCEDURE — 99233 SBSQ HOSP IP/OBS HIGH 50: CPT

## 2017-08-02 RX ORDER — POTASSIUM CHLORIDE 20 MEQ
40 PACKET (EA) ORAL EVERY 4 HOURS
Qty: 0 | Refills: 0 | Status: COMPLETED | OUTPATIENT
Start: 2017-08-02 | End: 2017-08-02

## 2017-08-02 RX ADMIN — LEVETIRACETAM 1000 MILLIGRAM(S): 250 TABLET, FILM COATED ORAL at 17:44

## 2017-08-02 RX ADMIN — Medication 0.1 MILLIGRAM(S): at 16:40

## 2017-08-02 RX ADMIN — Medication 0.1 MILLIGRAM(S): at 21:38

## 2017-08-02 RX ADMIN — SODIUM CHLORIDE 250 MILLILITER(S): 9 INJECTION INTRAMUSCULAR; INTRAVENOUS; SUBCUTANEOUS at 02:33

## 2017-08-02 RX ADMIN — LEVETIRACETAM 1000 MILLIGRAM(S): 250 TABLET, FILM COATED ORAL at 05:56

## 2017-08-02 RX ADMIN — SODIUM CHLORIDE 250 MILLILITER(S): 9 INJECTION INTRAMUSCULAR; INTRAVENOUS; SUBCUTANEOUS at 17:44

## 2017-08-02 RX ADMIN — Medication 40 MILLIEQUIVALENT(S): at 10:26

## 2017-08-02 RX ADMIN — Medication 40 MILLIEQUIVALENT(S): at 16:40

## 2017-08-02 RX ADMIN — Medication 0.1 MILLIGRAM(S): at 05:56

## 2017-08-02 NOTE — PROGRESS NOTE ADULT - SUBJECTIVE AND OBJECTIVE BOX
Patient: JETHRO SPANN [id number] 22y Male                 Internal Medicine Hospitalist Progress Note - Dr. Eric Beaulieu    Chief Complaint: Patient is a 22y old  Male who presents with a chief complaint of Seizures (2017 02:11)    HPI:  22 years old male with PMH of Seizure Disorder, poor compliance, brought in to ED by girlfriend due to recurrent seizures.  Patient admits to poor compliance with antiepileptic drugs.  Also has been using Marijuana on regular basis (4-5 blunts every day since the age of 16 years).  Pt seen by Neurology, started on Keppra 1000mg Q12h.  CPK noted 19k.  Given IVF.      Girlfriend, 1:1 aide at bedside.  Pt without complaints.  Denies suicidal / homicidal thoughts.  No agitation.  Girlfriend states pt at baseline.  Mild myalgias, improving.     ____________________PHYSICAL EXAM:  Vitals reviewed as indicated below  GENERAL:  NAD Alert and Oriented x 3   HEENT: NCAT  CARDIOVASCULAR:  S1, S2  LUNGS: CTAB  ABDOMEN:  soft, (-) tenderness, (-) distension, (+) bowel sounds, (-) guarding, (-) rebound (-) rigidity  EXTREMITIES:  no cyanosis / clubbing / edema.   NEURO: Strength symmetric.   PSYCH: calm, no agitation  ____________________      MEDICATIONS:  sodium chloride 0.9%. 1000 milliLiter(s) IV Continuous <Continuous>  acetaminophen   Tablet. 650 milliGRAM(s) Oral every 6 hours PRN  LORazepam   Injectable 2 milliGRAM(s) IV Push every 20 minutes PRN  ondansetron Injectable 4 milliGRAM(s) IV Push once  LORazepam   Injectable 2 milliGRAM(s) IV Push every 2 hours PRN  levETIRAcetam 1000 milliGRAM(s) Oral two times a day  cloNIDine 0.1 milliGRAM(s) Oral every 8 hours  potassium chloride    Tablet ER 40 milliEquivalent(s) Oral every 4 hours      VITALS:  Vital Signs Last 24 Hrs  T(C): 36.5 (02 Aug 2017 10:37), Max: 36.9 (01 Aug 2017 16:06)  T(F): 97.7 (02 Aug 2017 10:37), Max: 98.5 (01 Aug 2017 16:06)  HR: 59 (02 Aug 2017 10:37) (59 - 79)  BP: 146/89 (02 Aug 2017 10:37) (146/89 - 198/110)  BP(mean): 114 (01 Aug 2017 13:45) (114 - 135)  RR: 18 (02 Aug 2017 10:37) (14 - 19)  SpO2: 100% (02 Aug 2017 04:58) (98% - 100%) Daily     Daily Weight in k.1 (02 Aug 2017 04:58)  CAPILLARY BLOOD GLUCOSE        I&O's Summary    01 Aug 2017 07:01  -  02 Aug 2017 07:00  --------------------------------------------------------  IN: 6210 mL / OUT: 6400 mL / NET: -190 mL        LABS:                        13.0   5.2   )-----------( 178      ( 02 Aug 2017 07:00 )             39.5     08-02    143  |  106  |  6.0<L>  ----------------------------<  84  3.2<L>   |  23.0  |  0.77    Ca    8.3<L>      02 Aug 2017 07:00        RECENT CULTURES:          CARDIAC MARKERS ( 02 Aug 2017 07:00 )  x     / x     / 9821 U/L / x     / 4.3 ng/mL  CARDIAC MARKERS ( 01 Aug 2017 05:14 )  x     / x     / 63038 U/L / x     / 8.2 ng/mL

## 2017-08-02 NOTE — PROGRESS NOTE ADULT - ASSESSMENT
> Seizure disorder - No seizures.  Pt agreeable to MRI.  Keppra 1000mg q12h.  > Agitation / Psychosis - now resolved.  Pt denies substance abuse nor previous history of psych disorder.  Psychiatry evaluation appreciated. Sx resolved.  d/c 1:1 observation  > Poor compliance - again emphasized compliance.    > Rhabdomyolysis - CPK trending down.  Monitor Renal function.  Continue IVF.  > Hypokalemia - monitor lytes. Supplement KCl  > DVT Prophylaxis - OOB

## 2017-08-02 NOTE — PROGRESS NOTE ADULT - ASSESSMENT
A/P:  Rhabdomyolysis  - Secondary to seizure  - Improving  - Continue aggressive IVF. Can add lasix with IVF if become volume overloaded  - No bicarb needed at this time  No signs of PIN  Seizure   - Neurology on case   Hypokalemia  - replace    Thanks A/P:  Rhabdomyolysis  - Secondary to seizure, better, CK is 9821 from pk > 75068  - Improving  - Continue aggressive IVF. Can add lasix with IVF if become volume overloaded  - No bicarb needed at this time  No signs of PIN  Seizure   - Neurology on case   Hypokalemia  - replace as ord  d/w Dr Beaulieu and RN  Thanks

## 2017-08-02 NOTE — PROGRESS NOTE ADULT - SUBJECTIVE AND OBJECTIVE BOX
NEPHROLOGY INTERVAL HPI/OVERNIGHT EVENTS:  HPI:  22 years old male with PMH of Seizure Disorder brought by girlfriend with seizures. As per patient's girlfriend, patient was laying in couch around 3:38 pm when he had a seizure which lasted for 3 minutes. He went to sleep afterwards. Around 7:02 pm, he had a second seizure with vomiting. He was confused during that time. Around 9 pm, he had third seizure associated with urinary + fecal incontinence and foaming around mouth. Around 9:15 pm, he had similar fourth seizure so she brought him to the hospital. As per her, during seizures - patient becomes stiff and his whole body shakes. He did not hit his head or injure any part of body. No tongue bite. No fever, sick contacts or recent travel.   Patient is not taking his medications regularly. He uses Marijuana on regular basis (4-5 blunts every day since the age of 16 years).   He is active and alert but still confused and postictal at this time. (2017 02:11)      PAST MEDICAL & SURGICAL HISTORY:  Concussion  Seizures  No significant past surgical history      MEDICATIONS  (STANDING):  sodium chloride 0.9%. 1000 milliLiter(s) (250 mL/Hr) IV Continuous <Continuous>  ondansetron Injectable 4 milliGRAM(s) IV Push once  levETIRAcetam 1000 milliGRAM(s) Oral two times a day  cloNIDine 0.1 milliGRAM(s) Oral every 8 hours  potassium chloride    Tablet ER 40 milliEquivalent(s) Oral every 4 hours    MEDICATIONS  (PRN):  acetaminophen   Tablet. 650 milliGRAM(s) Oral every 6 hours PRN Headache or Bodyache  LORazepam   Injectable 2 milliGRAM(s) IV Push every 20 minutes PRN Seizure  LORazepam   Injectable 2 milliGRAM(s) IV Push every 2 hours PRN Assaultive behavior      Allergies    No Known Allergies    Intolerances        Vital Signs Last 24 Hrs  T(C): 36.5 (02 Aug 2017 10:37), Max: 36.9 (01 Aug 2017 16:06)  T(F): 97.7 (02 Aug 2017 10:37), Max: 98.5 (01 Aug 2017 16:06)  HR: 59 (02 Aug 2017 10:37) (59 - 79)  BP: 146/89 (02 Aug 2017 10:37) (146/89 - 198/110)  BP(mean): 114 (01 Aug 2017 13:45) (114 - 135)  RR: 18 (02 Aug 2017 10:37) (14 - 19)  SpO2: 100% (02 Aug 2017 04:58) (98% - 100%)  Daily     Daily Weight in k.1 (02 Aug 2017 04:58)    PHYSICAL EXAM:    GENERAL: NAD, well-groomed, well-developed  HEAD:  Atraumatic, Normocephalic  EYES: EOMI, PERRLA, conjunctiva and sclera clear  ENMT: No tonsillar erythema, exudates, or enlargement; Moist mucous membranes, Good dentition, No lesions  NECK: Supple, No JVD, Normal thyroid  NERVOUS SYSTEM:  Alert & Oriented X3, Good concentration; Motor Strength 5/5 B/L upper and lower extremities; DTRs 2+ intact and symmetric  CHEST/LUNG: Clear to percussion bilaterally; No rales, rhonchi, wheezing, or rubs  HEART: Regular rate and rhythm; No murmurs, rubs, or gallops  ABDOMEN: Soft, Nontender, Nondistended; Bowel sounds present  EXTREMITIES:  2+ Peripheral Pulses, No clubbing, cyanosis, or edema  SKIN: No rashes or lesions    LABS:                        13.0   5.2   )-----------( 178      ( 02 Aug 2017 07:00 )             39.5     08-02    143  |  106  |  6.0<L>  ----------------------------<  84  3.2<L>   |  23.0  |  0.77    Ca    8.3<L>      02 Aug 2017 07:00          RADIOLOGY & ADDITIONAL TESTS:

## 2017-08-03 LAB
ANION GAP SERPL CALC-SCNC: 13 MMOL/L — SIGNIFICANT CHANGE UP (ref 5–17)
BUN SERPL-MCNC: 7 MG/DL — LOW (ref 8–20)
CALCIUM SERPL-MCNC: 9.3 MG/DL — SIGNIFICANT CHANGE UP (ref 8.6–10.2)
CHLORIDE SERPL-SCNC: 102 MMOL/L — SIGNIFICANT CHANGE UP (ref 98–107)
CK MB CFR SERPL CALC: 3.2 NG/ML — SIGNIFICANT CHANGE UP (ref 0–6.7)
CK SERPL-CCNC: 4925 U/L — HIGH (ref 30–200)
CO2 SERPL-SCNC: 25 MMOL/L — SIGNIFICANT CHANGE UP (ref 22–29)
CREAT SERPL-MCNC: 0.85 MG/DL — SIGNIFICANT CHANGE UP (ref 0.5–1.3)
GLUCOSE SERPL-MCNC: 80 MG/DL — SIGNIFICANT CHANGE UP (ref 70–115)
HCT VFR BLD CALC: 43.1 % — SIGNIFICANT CHANGE UP (ref 42–52)
HGB BLD-MCNC: 14.2 G/DL — SIGNIFICANT CHANGE UP (ref 14–18)
MAGNESIUM SERPL-MCNC: 1.6 MG/DL — SIGNIFICANT CHANGE UP (ref 1.6–2.6)
MCHC RBC-ENTMCNC: 26.3 PG — LOW (ref 27–31)
MCHC RBC-ENTMCNC: 32.9 G/DL — SIGNIFICANT CHANGE UP (ref 32–36)
MCV RBC AUTO: 79.8 FL — LOW (ref 80–94)
PLATELET # BLD AUTO: 212 K/UL — SIGNIFICANT CHANGE UP (ref 150–400)
POTASSIUM SERPL-MCNC: 4.1 MMOL/L — SIGNIFICANT CHANGE UP (ref 3.5–5.3)
POTASSIUM SERPL-SCNC: 4.1 MMOL/L — SIGNIFICANT CHANGE UP (ref 3.5–5.3)
RBC # BLD: 5.4 M/UL — SIGNIFICANT CHANGE UP (ref 4.6–6.2)
RBC # FLD: 13.1 % — SIGNIFICANT CHANGE UP (ref 11–15.6)
SODIUM SERPL-SCNC: 140 MMOL/L — SIGNIFICANT CHANGE UP (ref 135–145)
WBC # BLD: 5.6 K/UL — SIGNIFICANT CHANGE UP (ref 4.8–10.8)
WBC # FLD AUTO: 5.6 K/UL — SIGNIFICANT CHANGE UP (ref 4.8–10.8)

## 2017-08-03 PROCEDURE — 99233 SBSQ HOSP IP/OBS HIGH 50: CPT

## 2017-08-03 RX ORDER — MAGNESIUM SULFATE 500 MG/ML
1 VIAL (ML) INJECTION ONCE
Qty: 0 | Refills: 0 | Status: COMPLETED | OUTPATIENT
Start: 2017-08-03 | End: 2017-08-03

## 2017-08-03 RX ADMIN — Medication 100 GRAM(S): at 13:44

## 2017-08-03 RX ADMIN — Medication 0.1 MILLIGRAM(S): at 06:50

## 2017-08-03 RX ADMIN — Medication 0.1 MILLIGRAM(S): at 13:44

## 2017-08-03 RX ADMIN — SODIUM CHLORIDE 150 MILLILITER(S): 9 INJECTION INTRAMUSCULAR; INTRAVENOUS; SUBCUTANEOUS at 17:21

## 2017-08-03 RX ADMIN — LEVETIRACETAM 1000 MILLIGRAM(S): 250 TABLET, FILM COATED ORAL at 06:50

## 2017-08-03 RX ADMIN — Medication 0.1 MILLIGRAM(S): at 22:16

## 2017-08-03 RX ADMIN — LEVETIRACETAM 1000 MILLIGRAM(S): 250 TABLET, FILM COATED ORAL at 17:21

## 2017-08-03 NOTE — PROGRESS NOTE ADULT - ASSESSMENT
> Seizure disorder - No seizures.  Pt agreeable to MRI.  Keppra 1000mg q12h.  > Agitation / Psychosis - now resolved.  Pt denies substance abuse nor previous history of psych disorder.  Psychiatry evaluation appreciated. Sx resolved.  d/c 1:1 observation  > Poor compliance - again emphasized compliance.    > Rhabdomyolysis - CPK 4.9k.  Monitor Renal function.  Continue IVF.  > Hypokalemia - monitor lytes. Supplement KCl  > DVT Prophylaxis - OOB

## 2017-08-03 NOTE — PROGRESS NOTE ADULT - SUBJECTIVE AND OBJECTIVE BOX
NEPHROLOGY INTERVAL HPI/OVERNIGHT EVENTS:  HPI:  22 years old male with PMH of Seizure Disorder brought by girlfriend with seizures. As per patient's girlfriend, patient was laying in couch around 3:38 pm when he had a seizure which lasted for 3 minutes. He went to sleep afterwards. Around 7:02 pm, he had a second seizure with vomiting. He was confused during that time. Around 9 pm, he had third seizure associated with urinary + fecal incontinence and foaming around mouth. Around 9:15 pm, he had similar fourth seizure so she brought him to the hospital. As per her, during seizures - patient becomes stiff and his whole body shakes. He did not hit his head or injure any part of body. No tongue bite. No fever, sick contacts or recent travel.   Patient is not taking his medications regularly. He uses Marijuana on regular basis (4-5 blunts every day since the age of 16 years).   He is active and alert but still confused and postictal at this time. (31 Jul 2017 02:11)    F/u Rhabdo       PAST MEDICAL & SURGICAL HISTORY:  Concussion  Seizures  No significant past surgical history      MEDICATIONS  (STANDING):  sodium chloride 0.9%. 1000 milliLiter(s) (250 mL/Hr) IV Continuous <Continuous>  ondansetron Injectable 4 milliGRAM(s) IV Push once  levETIRAcetam 1000 milliGRAM(s) Oral two times a day  cloNIDine 0.1 milliGRAM(s) Oral every 8 hours    MEDICATIONS  (PRN):  acetaminophen   Tablet. 650 milliGRAM(s) Oral every 6 hours PRN Headache or Bodyache  LORazepam   Injectable 2 milliGRAM(s) IV Push every 20 minutes PRN Seizure  LORazepam   Injectable 2 milliGRAM(s) IV Push every 2 hours PRN Assaultive behavior      Allergies    No Known Allergies    Intolerances        Vital Signs Last 24 Hrs  T(C): 36.4 (03 Aug 2017 08:00), Max: 36.9 (02 Aug 2017 21:31)  T(F): 97.5 (03 Aug 2017 08:00), Max: 98.5 (03 Aug 2017 04:20)  HR: 60 (03 Aug 2017 08:00) (58 - 64)  BP: 153/89 (03 Aug 2017 08:00) (149/78 - 158/82)  BP(mean): --  RR: 18 (03 Aug 2017 08:00) (18 - 20)  SpO2: 97% (03 Aug 2017 08:00) (97% - 99%)  Daily     Daily     PHYSICAL EXAM:    GENERAL: NAD, well-groomed, well-developed  HEAD:  Atraumatic, Normocephalic  EYES: EOMI, PERRLA, conjunctiva and sclera clear  ENMT: No tonsillar erythema, exudates, or enlargement; Moist mucous membranes, Good dentition, No lesions  NECK: Supple, No JVD, Normal thyroid  NERVOUS SYSTEM:  Alert & Oriented X3, Good concentration; Motor Strength 5/5 B/L upper and lower extremities; DTRs 2+ intact and symmetric  CHEST/LUNG: Clear to percussion bilaterally; No rales, rhonchi, wheezing, or rubs  HEART: Regular rate and rhythm; No murmurs, rubs, or gallops  ABDOMEN: Soft, Nontender, Nondistended; Bowel sounds present  EXTREMITIES:  2+ Peripheral Pulses, No clubbing, cyanosis, or edema  SKIN: No rashes or lesions    LABS:                        14.2   5.6   )-----------( 212      ( 03 Aug 2017 06:55 )             43.1     08-03    140  |  102  |  7.0<L>  ----------------------------<  80  4.1   |  25.0  |  0.85    Ca    9.3      03 Aug 2017 06:55  Mg     1.6     08-03      CPK 4925    A/P  Rhabdomyolysis  - Secondary to seizure  - Improving  - Continue aggressive IVF. Can add Lasix with IVF if become volume overloaded  - No bicarb needed at this time  - No signs of PIN  Seizure   - Neurology on case   Hypokalemia/Hypomagnesemia   - replace as ordered      Thanks NEPHROLOGY INTERVAL HPI/OVERNIGHT EVENTS:  HPI:  22 years old male with PMH of Seizure Disorder brought by girlfriend with seizures. As per patient's girlfriend, patient was laying in couch around 3:38 pm when he had a seizure which lasted for 3 minutes. He went to sleep afterwards. Around 7:02 pm, he had a second seizure with vomiting. He was confused during that time. Around 9 pm, he had third seizure associated with urinary + fecal incontinence and foaming around mouth. Around 9:15 pm, he had similar fourth seizure so she brought him to the hospital. As per her, during seizures - patient becomes stiff and his whole body shakes. He did not hit his head or injure any part of body. No tongue bite. No fever, sick contacts or recent travel.   Patient is not taking his medications regularly. He uses Marijuana on regular basis (4-5 blunts every day since the age of 16 years).   He is active and alert but still confused and postictal at this time. (31 Jul 2017 02:11)    F/u Rhabdo       PAST MEDICAL & SURGICAL HISTORY:  Concussion  Seizures  No significant past surgical history      MEDICATIONS  (STANDING):  sodium chloride 0.9%. 1000 milliLiter(s) (250 mL/Hr) IV Continuous <Continuous>  ondansetron Injectable 4 milliGRAM(s) IV Push once  levETIRAcetam 1000 milliGRAM(s) Oral two times a day  cloNIDine 0.1 milliGRAM(s) Oral every 8 hours    MEDICATIONS  (PRN):  acetaminophen   Tablet. 650 milliGRAM(s) Oral every 6 hours PRN Headache or Bodyache  LORazepam   Injectable 2 milliGRAM(s) IV Push every 20 minutes PRN Seizure  LORazepam   Injectable 2 milliGRAM(s) IV Push every 2 hours PRN Assaultive behavior      Allergies    No Known Allergies    Intolerances        Vital Signs Last 24 Hrs  T(C): 36.4 (03 Aug 2017 08:00), Max: 36.9 (02 Aug 2017 21:31)  T(F): 97.5 (03 Aug 2017 08:00), Max: 98.5 (03 Aug 2017 04:20)  HR: 60 (03 Aug 2017 08:00) (58 - 64)  BP: 153/89 (03 Aug 2017 08:00) (149/78 - 158/82)  BP(mean): --  RR: 18 (03 Aug 2017 08:00) (18 - 20)  SpO2: 97% (03 Aug 2017 08:00) (97% - 99%)  Daily     Daily     PHYSICAL EXAM:    GENERAL: NAD, well-groomed, well-developed  HEAD:  Atraumatic, Normocephalic  EYES: EOMI, PERRLA, conjunctiva and sclera clear  ENMT: No tonsillar erythema, exudates, or enlargement; Moist mucous membranes, Good dentition, No lesions  NECK: Supple, No JVD, Normal thyroid  NERVOUS SYSTEM:  Alert & Oriented X3, Good concentration; Motor Strength 5/5 B/L upper and lower extremities; DTRs 2+ intact and symmetric  CHEST/LUNG: Clear to percussion bilaterally; No rales, rhonchi, wheezing, or rubs  HEART: Regular rate and rhythm; No murmurs, rubs, or gallops  ABDOMEN: Soft, Nontender, Nondistended; Bowel sounds present  EXTREMITIES:  2+ Peripheral Pulses, No clubbing, cyanosis, or edema  SKIN: No rashes or lesions    LABS:                        14.2   5.6   )-----------( 212      ( 03 Aug 2017 06:55 )             43.1     08-03    140  |  102  |  7.0<L>  ----------------------------<  80  4.1   |  25.0  |  0.85    Ca    9.3      03 Aug 2017 06:55  Mg     1.6     08-03      CPK 4925    A/P  Rhabdomyolysis  - Secondary to seizure  - Improving  - Continue aggressive IVF. Can add Lasix with IVF if become volume overloaded  - No bicarb needed at this time  - No signs of PIN  Seizure   - Neurology on case   Hypokalemia/Hypomagnesemia   - replace as ordered    No renal objection to d/c. He will need repeat CPK level done in 1 week with [facility]    Thanks

## 2017-08-03 NOTE — PROGRESS NOTE ADULT - SUBJECTIVE AND OBJECTIVE BOX
Patient: JETHRO SPANN [id number] 22y Male                 Internal Medicine Hospitalist Progress Note - Dr. Eric Beaulieu    Chief Complaint: Patient is a 22y old  Male who presents with a chief complaint of Seizures (31 Jul 2017 02:11)    HPI:  22 years old male with PMH of Seizure Disorder, poor compliance, brought in to ED by girlfriend due to recurrent seizures.  Patient admits to poor compliance with antiepileptic drugs.  Also has been using Marijuana on regular basis (4-5 blunts every day since the age of 16 years).  Pt seen by Neurology, started on Keppra 1000mg Q12h.  CPK noted 19k.  Given IVF.      No complaints.  Myalgias improving.  No additional complaints.      ____________________PHYSICAL EXAM:  Vitals reviewed as indicated below  GENERAL:  NAD Alert and Oriented x 3   HEENT: NCAT  CARDIOVASCULAR:  S1, S2  LUNGS: CTAB  ABDOMEN:  soft, (-) tenderness, (-) distension, (+) bowel sounds, (-) guarding, (-) rebound (-) rigidity  EXTREMITIES:  no cyanosis / clubbing / edema.   NEURO: Strength symmetric.   PSYCH: calm, no agitation  ____________________      MEDICATIONS:  sodium chloride 0.9%. 1000 milliLiter(s) IV Continuous <Continuous>  acetaminophen   Tablet. 650 milliGRAM(s) Oral every 6 hours PRN  LORazepam   Injectable 2 milliGRAM(s) IV Push every 20 minutes PRN  ondansetron Injectable 4 milliGRAM(s) IV Push once  LORazepam   Injectable 2 milliGRAM(s) IV Push every 2 hours PRN  levETIRAcetam 1000 milliGRAM(s) Oral two times a day  cloNIDine 0.1 milliGRAM(s) Oral every 8 hours      VITALS:  Vital Signs Last 24 Hrs  T(C): 36.4 (03 Aug 2017 08:00), Max: 36.9 (02 Aug 2017 21:31)  T(F): 97.5 (03 Aug 2017 08:00), Max: 98.5 (03 Aug 2017 04:20)  HR: 60 (03 Aug 2017 08:00) (58 - 64)  BP: 153/89 (03 Aug 2017 08:00) (149/78 - 158/82)  BP(mean): --  RR: 18 (03 Aug 2017 08:00) (18 - 20)  SpO2: 97% (03 Aug 2017 08:00) (97% - 99%) Daily     Daily   CAPILLARY BLOOD GLUCOSE        I&O's Summary    02 Aug 2017 07:01  -  03 Aug 2017 07:00  --------------------------------------------------------  IN: 720 mL / OUT: 803 mL / NET: -83 mL        LABS:                        14.2   5.6   )-----------( 212      ( 03 Aug 2017 06:55 )             43.1     08-03    140  |  102  |  7.0<L>  ----------------------------<  80  4.1   |  25.0  |  0.85    Ca    9.3      03 Aug 2017 06:55  Mg     1.6     08-03        RECENT CULTURES:          CARDIAC MARKERS ( 03 Aug 2017 06:55 )  x     / x     / 4925 U/L / x     / 3.2 ng/mL  CARDIAC MARKERS ( 02 Aug 2017 07:00 )  x     / x     / 9821 U/L / x     / 4.3 ng/mL

## 2017-08-04 ENCOUNTER — TRANSCRIPTION ENCOUNTER (OUTPATIENT)
Age: 22
End: 2017-08-04

## 2017-08-04 VITALS
RESPIRATION RATE: 18 BRPM | DIASTOLIC BLOOD PRESSURE: 82 MMHG | TEMPERATURE: 98 F | HEART RATE: 57 BPM | SYSTOLIC BLOOD PRESSURE: 133 MMHG

## 2017-08-04 LAB
ANION GAP SERPL CALC-SCNC: 13 MMOL/L — SIGNIFICANT CHANGE UP (ref 5–17)
BUN SERPL-MCNC: 8 MG/DL — SIGNIFICANT CHANGE UP (ref 8–20)
CALCIUM SERPL-MCNC: 9.6 MG/DL — SIGNIFICANT CHANGE UP (ref 8.6–10.2)
CHLORIDE SERPL-SCNC: 101 MMOL/L — SIGNIFICANT CHANGE UP (ref 98–107)
CK MB CFR SERPL CALC: 3.8 NG/ML — SIGNIFICANT CHANGE UP (ref 0–6.7)
CK SERPL-CCNC: 2463 U/L — HIGH (ref 30–200)
CO2 SERPL-SCNC: 26 MMOL/L — SIGNIFICANT CHANGE UP (ref 22–29)
CREAT SERPL-MCNC: 0.95 MG/DL — SIGNIFICANT CHANGE UP (ref 0.5–1.3)
GLUCOSE SERPL-MCNC: 82 MG/DL — SIGNIFICANT CHANGE UP (ref 70–115)
HCT VFR BLD CALC: 45.8 % — SIGNIFICANT CHANGE UP (ref 42–52)
HGB BLD-MCNC: 15.2 G/DL — SIGNIFICANT CHANGE UP (ref 14–18)
MAGNESIUM SERPL-MCNC: 1.8 MG/DL — SIGNIFICANT CHANGE UP (ref 1.6–2.6)
MCHC RBC-ENTMCNC: 26.3 PG — LOW (ref 27–31)
MCHC RBC-ENTMCNC: 33.2 G/DL — SIGNIFICANT CHANGE UP (ref 32–36)
MCV RBC AUTO: 79.1 FL — LOW (ref 80–94)
PHOSPHATE SERPL-MCNC: 3.2 MG/DL — SIGNIFICANT CHANGE UP (ref 2.4–4.7)
PLATELET # BLD AUTO: 234 K/UL — SIGNIFICANT CHANGE UP (ref 150–400)
POTASSIUM SERPL-MCNC: 4 MMOL/L — SIGNIFICANT CHANGE UP (ref 3.5–5.3)
POTASSIUM SERPL-SCNC: 4 MMOL/L — SIGNIFICANT CHANGE UP (ref 3.5–5.3)
RBC # BLD: 5.79 M/UL — SIGNIFICANT CHANGE UP (ref 4.6–6.2)
RBC # FLD: 13.1 % — SIGNIFICANT CHANGE UP (ref 11–15.6)
SODIUM SERPL-SCNC: 140 MMOL/L — SIGNIFICANT CHANGE UP (ref 135–145)
WBC # BLD: 4.8 K/UL — SIGNIFICANT CHANGE UP (ref 4.8–10.8)
WBC # FLD AUTO: 4.8 K/UL — SIGNIFICANT CHANGE UP (ref 4.8–10.8)

## 2017-08-04 PROCEDURE — 36415 COLL VENOUS BLD VENIPUNCTURE: CPT

## 2017-08-04 PROCEDURE — 84100 ASSAY OF PHOSPHORUS: CPT

## 2017-08-04 PROCEDURE — 99239 HOSP IP/OBS DSCHRG MGMT >30: CPT

## 2017-08-04 PROCEDURE — 99285 EMERGENCY DEPT VISIT HI MDM: CPT | Mod: 25

## 2017-08-04 PROCEDURE — 83735 ASSAY OF MAGNESIUM: CPT

## 2017-08-04 PROCEDURE — 85027 COMPLETE CBC AUTOMATED: CPT

## 2017-08-04 PROCEDURE — 82550 ASSAY OF CK (CPK): CPT

## 2017-08-04 PROCEDURE — 93005 ELECTROCARDIOGRAM TRACING: CPT

## 2017-08-04 PROCEDURE — 96374 THER/PROPH/DIAG INJ IV PUSH: CPT

## 2017-08-04 PROCEDURE — 82553 CREATINE MB FRACTION: CPT

## 2017-08-04 PROCEDURE — 81001 URINALYSIS AUTO W/SCOPE: CPT

## 2017-08-04 PROCEDURE — 70450 CT HEAD/BRAIN W/O DYE: CPT

## 2017-08-04 PROCEDURE — 80307 DRUG TEST PRSMV CHEM ANLYZR: CPT

## 2017-08-04 PROCEDURE — 80048 BASIC METABOLIC PNL TOTAL CA: CPT

## 2017-08-04 RX ORDER — LEVETIRACETAM 250 MG/1
1 TABLET, FILM COATED ORAL
Qty: 60 | Refills: 0 | OUTPATIENT
Start: 2017-08-04 | End: 2017-09-03

## 2017-08-04 RX ORDER — AMLODIPINE BESYLATE 2.5 MG/1
1 TABLET ORAL
Qty: 30 | Refills: 0 | OUTPATIENT
Start: 2017-08-04 | End: 2017-09-03

## 2017-08-04 RX ORDER — AMLODIPINE BESYLATE 2.5 MG/1
2.5 TABLET ORAL DAILY
Qty: 0 | Refills: 0 | Status: DISCONTINUED | OUTPATIENT
Start: 2017-08-04 | End: 2017-08-04

## 2017-08-04 RX ORDER — LEVETIRACETAM 250 MG/1
1 TABLET, FILM COATED ORAL
Qty: 0 | Refills: 0 | COMMUNITY
Start: 2017-08-04

## 2017-08-04 RX ADMIN — LEVETIRACETAM 1000 MILLIGRAM(S): 250 TABLET, FILM COATED ORAL at 05:48

## 2017-08-04 RX ADMIN — Medication 0.1 MILLIGRAM(S): at 05:48

## 2017-08-04 NOTE — DISCHARGE NOTE ADULT - PATIENT PORTAL LINK FT
“You can access the [facility] Patient Portal, offered by [facility], by registering with the following website: [email or web address]”

## 2017-08-04 NOTE — DISCHARGE NOTE ADULT - PROVIDER TOKENS
TOKEN:'[id number]',FREE:[LAST:[Gabo],PHONE:[(   )    -],FAX:[(   )    -]],TOKEN:'[id number]'

## 2017-08-04 NOTE — DISCHARGE NOTE ADULT - MEDICATION SUMMARY - MEDICATIONS TO CHANGE
I will SWITCH the dose or number of times a day I take the medications listed below when I get home from the hospital:    Keppra  --  by mouth    Keppra 1000 mg oral tablet  -- 1 tab(s) by mouth once a day  -- Check with your doctor before becoming pregnant.  It is very important that you take or use this exactly as directed.  Do not skip doses or discontinue unless directed by your doctor.  May cause drowsiness or dizziness.  Obtain medical advice before taking any non-prescription drugs as some may affect the action of this medication.  Swallow whole.  Do not crush.  This drug may impair the ability to drive or operate machinery.  Use care until you become familiar with its effects.

## 2017-08-04 NOTE — DISCHARGE NOTE ADULT - MEDICATION SUMMARY - MEDICATIONS TO TAKE
I will START or STAY ON the medications listed below when I get home from the hospital:    levETIRAcetam 1000 mg oral tablet  -- 1 tab(s) by mouth 2 times a day  -- Indication: For Seizures    amLODIPine 2.5 mg oral tablet  -- 1 tab(s) by mouth once a day  -- Indication: For Hypertension

## 2017-08-04 NOTE — DISCHARGE NOTE ADULT - CARE PROVIDER_API CALL
Kumar Dejesus), Internal Medicine; Nephrology  [address] [facility]  [address] [place] [address]  Phone: [phone]  Fax: [phone]    [place]abo,   Phone: (   )    -  Fax: (   )    -    Aniket Sinha), Neurology  [address] [place] [address]  Phone: [phone]  Fax: [phone]

## 2017-08-04 NOTE — DISCHARGE NOTE ADULT - PLAN OF CARE
stable for discharge It is important to see your primary physician as well as the physicians noted below within the next week to perform a comprehensive medical review.  Call their offices for an appointment as soon as you leave the hospital.  If you do not have a primary physician, contact the [facility] at [place] [phone] located on [address] [place] [address].  Your medical issues appear to be stable at this time, but if your symptoms recur or worsen, contact your physicians and/or return to the hospital if necessary.  If you encounter any issues or questions with your medication, call your physicians before stopping the medication.  Do not drive.  Limit your diet to 2 grams of sodium daily. Maintain hydration.  Check labwork in 1 week for "CPK" and kidney function.

## 2017-08-04 NOTE — PROGRESS NOTE ADULT - ASSESSMENT
A/P  Rhabdomyolysis  - Secondary to seizure  - Improving  - Continue aggressive IVF. Can add Lasix with IVF if become volume overloaded  - No bicarb needed at this time  - No signs of PIN  Seizure   - Neurology on case   Hypokalemia/Hypomagnesemia   - replace as ordered    No renal objection to d/c. He will need repeat CPK level done in 1 week with [facility]    Thanks A/P  Rhabdomyolysis  - Secondary to seizure  - Improving  - Continue aggressive IVF. Can add Lasix with IVF if become volume overloaded  - No bicarb needed at this time  - No signs of PIN  Seizure   - Neurology on case   Hypokalemia/Hypomagnesemia   - replace as ordered    No renal objection to d/c. He will need repeat CPK level done in 1 week with [facility], seems had some issues for dcing, d/w RN and mother and pt.  will check CPK if still in hospital  Thanks

## 2017-08-04 NOTE — DISCHARGE NOTE ADULT - HOSPITAL COURSE
Patient: JETHRO SPANN [id number]                 Internal Medicine Hospitalist Discharge Note - Dr. Eric Beaulieu    Chief Complaint: Patient is a 22y old  Male who presents with a chief complaint of Seizures (31 Jul 2017 02:11)    HPI:  22 years old male with PMH of Seizure Disorder, poor compliance, brought in to ED by girlfriend due to recurrent seizures.  Patient admits to poor compliance with antiepileptic drugs.  Also has been using Marijuana on regular basis (4-5 blunts every day since the age of 16 years).  Pt seen by Neurology, started on Keppra 1000mg Q12h.  CPK noted 19k.  Given IVF.      Seen with mother at bedside. CPK now 2463.  Pt maintaining hydration.  Renal function stable. Notes mild myalgias improving.  No additional complaints.       ____________________PHYSICAL EXAM:  Vitals reviewed as indicated below  GENERAL:  NAD Alert and Oriented x 3   HEENT: NCAT  CARDIOVASCULAR:  S1, S2  LUNGS: CTAB  ABDOMEN:  soft, (-) tenderness, (-) distension, (+) bowel sounds, (-) guarding, (-) rebound (-) rigidity  EXTREMITIES:  no cyanosis / clubbing / edema.   NEURO: Strength symmetric.   PSYCH: calm, no agitation  ____________________    VITALS:  Vital Signs Last 24 Hrs  T(C): 36.7 (04 Aug 2017 08:29), Max: 36.9 (03 Aug 2017 17:35)  T(F): 98.1 (04 Aug 2017 08:29), Max: 98.5 (03 Aug 2017 17:35)  HR: 57 (04 Aug 2017 08:29) (53 - 68)  BP: 133/82 (04 Aug 2017 08:29) (133/82 - 144/83)  BP(mean): --  RR: 18 (04 Aug 2017 08:29) (18 - 20)  SpO2: 98% (04 Aug 2017 04:50) (98% - 98%) Daily     Daily   CAPILLARY BLOOD GLUCOSE        I&O's Summary    03 Aug 2017 07:01  -  04 Aug 2017 07:00  --------------------------------------------------------  IN: 2430 mL / OUT: 450 mL / NET: 1980 mL    04 Aug 2017 07:01  -  04 Aug 2017 11:10  --------------------------------------------------------  IN: 0 mL / OUT: 500 mL / NET: -500 mL        LABS:                        15.2   4.8   )-----------( 234      ( 04 Aug 2017 07:43 )             45.8     08-04    140  |  101  |  8.0  ----------------------------<  82  4.0   |  26.0  |  0.95    Ca    9.6      04 Aug 2017 07:43  Phos  3.2     08-04  Mg     1.8     08-04        RECENT CULTURES:          CARDIAC MARKERS ( 04 Aug 2017 07:43 )  x     / x     / 2463 U/L / x     / 3.8 ng/mL  CARDIAC MARKERS ( 03 Aug 2017 06:55 )  x     / x     / 4925 U/L / x     / 3.2 ng/mL    > Seizure disorder - No seizures.  Pt wished to have neuro followup as outpatient regarding MRI.  Keppra 1000mg q12h.  Advised not to drive, operate heavy machinery, etc.    > Agitation / Psychosis - now resolved. > Poor compliance - again emphasized compliance.    > Rhabdomyolysis - Emphasized need for hydration and outpatient f/u.  Discussed extensively with mother and patient, acknowledged understanding.    > Hypokalemia - stable    Disposition: stable for discharge.  Outpatient followup as above.  Patient was advised to return if they experience any recurrence or worsening of symptoms.  Total time spent on discharge was 35 minutes.  -Eric Beaulieu D.O., Hospitalist, [facility]

## 2017-08-04 NOTE — DISCHARGE NOTE ADULT - CARE PLAN
Principal Discharge DX:	Seizures  Goal:	stable for discharge  Instructions for follow-up, activity and diet:	It is important to see your primary physician as well as the physicians noted below within the next week to perform a comprehensive medical review.  Call their offices for an appointment as soon as you leave the hospital.  If you do not have a primary physician, contact the [facility] at [place] [phone] located on [address] [place] [address], [place] [address].  Your medical issues appear to be stable at this time, but if your symptoms recur or worsen, contact your physicians and/or return to the hospital if necessary.  If you encounter any issues or questions with your medication, call your physicians before stopping the medication.  Do not drive.  Limit your diet to 2 grams of sodium daily.  Secondary Diagnosis:	Non-traumatic rhabdomyolysis  Instructions for follow-up, activity and diet:	Maintain hydration.  Check labwork in 1 week for "CPK" and kidney function.  Secondary Diagnosis:	Delirium due to general medical condition  Secondary Diagnosis:	Essential hypertension Principal Discharge DX:	Seizures  Goal:	stable for discharge  Instructions for follow-up, activity and diet:	It is important to see your primary physician as well as the physicians noted below within the next week to perform a comprehensive medical review.  Call their offices for an appointment as soon as you leave the hospital.  If you do not have a primary physician, contact the [facility] at [place] [phone] located on [address] [place] [address], [place] [address].  Your medical issues appear to be stable at this time, but if your symptoms recur or worsen, contact your physicians and/or return to the hospital if necessary.  If you encounter any issues or questions with your medication, call your physicians before stopping the medication.  Do not drive.  Limit your diet to 2 grams of sodium daily.  Secondary Diagnosis:	Non-traumatic rhabdomyolysis  Instructions for follow-up, activity and diet:	Maintain hydration.  Check labwork in 1 week for "CPK" and kidney function.  Secondary Diagnosis:	Delirium due to general medical condition  Secondary Diagnosis:	Essential hypertension

## 2017-08-04 NOTE — PROGRESS NOTE ADULT - SUBJECTIVE AND OBJECTIVE BOX
NEPHROLOGY INTERVAL HPI/OVERNIGHT EVENTS:  HPI:  22 years old male with PMH of Seizure Disorder brought by girlfriend with seizures. As per patient's girlfriend, patient was laying in couch around 3:38 pm when he had a seizure which lasted for 3 minutes. He went to sleep afterwards. Around 7:02 pm, he had a second seizure with vomiting. He was confused during that time. Around 9 pm, he had third seizure associated with urinary + fecal incontinence and foaming around mouth. Around 9:15 pm, he had similar fourth seizure so she brought him to the hospital. As per her, during seizures - patient becomes stiff and his whole body shakes. He did not hit his head or injure any part of body. No tongue bite. No fever, sick contacts or recent travel.   Patient is not taking his medications regularly. He uses Marijuana on regular basis (4-5 blunts every day since the age of 16 years).   He is active and alert but still confused and postictal at this time. (31 Jul 2017 02:11)      PAST MEDICAL & SURGICAL HISTORY:  Concussion  Seizures  No significant past surgical history      MEDICATIONS  (STANDING):  sodium chloride 0.9%. 1000 milliLiter(s) (150 mL/Hr) IV Continuous <Continuous>  ondansetron Injectable 4 milliGRAM(s) IV Push once  levETIRAcetam 1000 milliGRAM(s) Oral two times a day  amLODIPine   Tablet 2.5 milliGRAM(s) Oral daily    MEDICATIONS  (PRN):  acetaminophen   Tablet. 650 milliGRAM(s) Oral every 6 hours PRN Headache or Bodyache  LORazepam   Injectable 2 milliGRAM(s) IV Push every 20 minutes PRN Seizure  LORazepam   Injectable 2 milliGRAM(s) IV Push every 2 hours PRN Assaultive behavior      Allergies    No Known Allergies    Intolerances        Vital Signs Last 24 Hrs  T(C): 36.7 (04 Aug 2017 08:29), Max: 36.9 (03 Aug 2017 17:35)  T(F): 98.1 (04 Aug 2017 08:29), Max: 98.5 (03 Aug 2017 17:35)  HR: 57 (04 Aug 2017 08:29) (53 - 68)  BP: 133/82 (04 Aug 2017 08:29) (133/82 - 144/83)  BP(mean): --  RR: 18 (04 Aug 2017 08:29) (18 - 20)  SpO2: 98% (04 Aug 2017 04:50) (98% - 98%)  Daily     Daily     PHYSICAL EXAM:    GENERAL: NAD, well-groomed, well-developed  HEAD:  Atraumatic, Normocephalic  EYES: EOMI, PERRLA, conjunctiva and sclera clear  ENMT: No tonsillar erythema, exudates, or enlargement; Moist mucous membranes, Good dentition, No lesions  NECK: Supple, No JVD, Normal thyroid  NERVOUS SYSTEM:  Alert & Oriented X3, Good concentration; Motor Strength 5/5 B/L upper and lower extremities; DTRs 2+ intact and symmetric  CHEST/LUNG: Clear to percussion bilaterally; No rales, rhonchi, wheezing, or rubs  HEART: Regular rate and rhythm; No murmurs, rubs, or gallops  ABDOMEN: Soft, Nontender, Nondistended; Bowel sounds present  EXTREMITIES:  2+ Peripheral Pulses, No clubbing, cyanosis, or edema  SKIN: No rashes or lesions    LABS:                        15.2   4.8   )-----------( 234      ( 04 Aug 2017 07:43 )             45.8     08-04    140  |  101  |  8.0  ----------------------------<  82  4.0   |  26.0  |  0.95    Ca    9.6      04 Aug 2017 07:43  Phos  3.2     08-04  Mg     1.8     08-04    Magnesium, Serum: 1.8 mg/dL (08-04 @ 07:43)  Phosphorus Level, Serum: 3.2 mg/dL (08-04 @ 07:43)    RADIOLOGY & ADDITIONAL TESTS:  CK

## 2017-11-20 ENCOUNTER — INPATIENT (INPATIENT)
Facility: HOSPITAL | Age: 22
LOS: 0 days | Discharge: HOME | DRG: 101 | End: 2017-11-21
Attending: INTERNAL MEDICINE | Admitting: HOSPITALIST
Payer: COMMERCIAL

## 2017-11-20 VITALS
OXYGEN SATURATION: 100 % | DIASTOLIC BLOOD PRESSURE: 65 MMHG | SYSTOLIC BLOOD PRESSURE: 135 MMHG | HEIGHT: 77 IN | TEMPERATURE: 98 F | RESPIRATION RATE: 28 BRPM | WEIGHT: 250 LBS | HEART RATE: 101 BPM

## 2017-11-20 DIAGNOSIS — R56.9 UNSPECIFIED CONVULSIONS: ICD-10-CM

## 2017-11-20 DIAGNOSIS — I10 ESSENTIAL (PRIMARY) HYPERTENSION: ICD-10-CM

## 2017-11-20 DIAGNOSIS — Z29.9 ENCOUNTER FOR PROPHYLACTIC MEASURES, UNSPECIFIED: ICD-10-CM

## 2017-11-20 DIAGNOSIS — M62.82 RHABDOMYOLYSIS: ICD-10-CM

## 2017-11-20 LAB
ALBUMIN SERPL ELPH-MCNC: 5.2 G/DL — SIGNIFICANT CHANGE UP (ref 3.3–5.2)
ALP SERPL-CCNC: 82 U/L — SIGNIFICANT CHANGE UP (ref 40–120)
ALT FLD-CCNC: 30 U/L — SIGNIFICANT CHANGE UP
ANION GAP SERPL CALC-SCNC: 39 MMOL/L — HIGH (ref 5–17)
AST SERPL-CCNC: 29 U/L — SIGNIFICANT CHANGE UP
BASOPHILS # BLD AUTO: 0 K/UL — SIGNIFICANT CHANGE UP (ref 0–0.2)
BASOPHILS NFR BLD AUTO: 0.1 % — SIGNIFICANT CHANGE UP (ref 0–2)
BILIRUB SERPL-MCNC: 0.6 MG/DL — SIGNIFICANT CHANGE UP (ref 0.4–2)
BUN SERPL-MCNC: 10 MG/DL — SIGNIFICANT CHANGE UP (ref 8–20)
CALCIUM SERPL-MCNC: 10 MG/DL — SIGNIFICANT CHANGE UP (ref 8.6–10.2)
CHLORIDE SERPL-SCNC: 95 MMOL/L — LOW (ref 98–107)
CO2 SERPL-SCNC: 10 MMOL/L — CRITICAL LOW (ref 22–29)
CREAT SERPL-MCNC: 1.29 MG/DL — SIGNIFICANT CHANGE UP (ref 0.5–1.3)
EOSINOPHIL # BLD AUTO: 0 K/UL — SIGNIFICANT CHANGE UP (ref 0–0.5)
EOSINOPHIL NFR BLD AUTO: 0.1 % — SIGNIFICANT CHANGE UP (ref 0–5)
ETHANOL SERPL-MCNC: <10 MG/DL — SIGNIFICANT CHANGE UP
GAS PNL BLDA: SIGNIFICANT CHANGE UP
GLUCOSE SERPL-MCNC: 115 MG/DL — SIGNIFICANT CHANGE UP (ref 70–115)
HCT VFR BLD CALC: 53 % — HIGH (ref 42–52)
HGB BLD-MCNC: 16.9 G/DL — SIGNIFICANT CHANGE UP (ref 14–18)
LYMPHOCYTES # BLD AUTO: 22.9 % — SIGNIFICANT CHANGE UP (ref 20–55)
LYMPHOCYTES # BLD AUTO: 3.1 K/UL — SIGNIFICANT CHANGE UP (ref 1–4.8)
MCHC RBC-ENTMCNC: 27.9 PG — SIGNIFICANT CHANGE UP (ref 27–31)
MCHC RBC-ENTMCNC: 31.9 G/DL — LOW (ref 32–36)
MCV RBC AUTO: 87.6 FL — SIGNIFICANT CHANGE UP (ref 80–94)
MONOCYTES # BLD AUTO: 0.7 K/UL — SIGNIFICANT CHANGE UP (ref 0–0.8)
MONOCYTES NFR BLD AUTO: 5.3 % — SIGNIFICANT CHANGE UP (ref 3–10)
NEUTROPHILS # BLD AUTO: 9.6 K/UL — HIGH (ref 1.8–8)
NEUTROPHILS NFR BLD AUTO: 70.9 % — SIGNIFICANT CHANGE UP (ref 37–73)
PLATELET # BLD AUTO: 261 K/UL — SIGNIFICANT CHANGE UP (ref 150–400)
POTASSIUM SERPL-MCNC: 3.8 MMOL/L — SIGNIFICANT CHANGE UP (ref 3.5–5.3)
POTASSIUM SERPL-SCNC: 3.8 MMOL/L — SIGNIFICANT CHANGE UP (ref 3.5–5.3)
PROT SERPL-MCNC: 8.8 G/DL — HIGH (ref 6.6–8.7)
RBC # BLD: 6.05 M/UL — SIGNIFICANT CHANGE UP (ref 4.6–6.2)
RBC # FLD: 13.3 % — SIGNIFICANT CHANGE UP (ref 11–15.6)
SODIUM SERPL-SCNC: 144 MMOL/L — SIGNIFICANT CHANGE UP (ref 135–145)
VALPROATE SERPL-MCNC: 51.9 UG/ML — SIGNIFICANT CHANGE UP (ref 50–100)
WBC # BLD: 13.6 K/UL — HIGH (ref 4.8–10.8)
WBC # FLD AUTO: 13.6 K/UL — HIGH (ref 4.8–10.8)

## 2017-11-20 PROCEDURE — 99223 1ST HOSP IP/OBS HIGH 75: CPT | Mod: GC

## 2017-11-20 PROCEDURE — 93010 ELECTROCARDIOGRAM REPORT: CPT

## 2017-11-20 PROCEDURE — 99291 CRITICAL CARE FIRST HOUR: CPT

## 2017-11-20 RX ORDER — MIDAZOLAM HYDROCHLORIDE 1 MG/ML
4 INJECTION, SOLUTION INTRAMUSCULAR; INTRAVENOUS ONCE
Qty: 0 | Refills: 0 | Status: DISCONTINUED | OUTPATIENT
Start: 2017-11-20 | End: 2017-11-20

## 2017-11-20 RX ORDER — DIVALPROEX SODIUM 500 MG/1
1000 TABLET, DELAYED RELEASE ORAL
Qty: 0 | Refills: 0 | Status: DISCONTINUED | OUTPATIENT
Start: 2017-11-21 | End: 2017-11-21

## 2017-11-20 RX ORDER — LEVETIRACETAM 250 MG/1
1000 TABLET, FILM COATED ORAL
Qty: 0 | Refills: 0 | Status: DISCONTINUED | OUTPATIENT
Start: 2017-11-21 | End: 2017-11-21

## 2017-11-20 RX ORDER — SODIUM CHLORIDE 9 MG/ML
2000 INJECTION INTRAMUSCULAR; INTRAVENOUS; SUBCUTANEOUS ONCE
Qty: 0 | Refills: 0 | Status: COMPLETED | OUTPATIENT
Start: 2017-11-20 | End: 2017-11-20

## 2017-11-20 RX ORDER — KETAMINE HYDROCHLORIDE 100 MG/ML
50 INJECTION INTRAMUSCULAR; INTRAVENOUS ONCE
Qty: 0 | Refills: 0 | Status: DISCONTINUED | OUTPATIENT
Start: 2017-11-20 | End: 2017-11-20

## 2017-11-20 RX ORDER — DIVALPROEX SODIUM 500 MG/1
500 TABLET, DELAYED RELEASE ORAL
Qty: 0 | Refills: 0 | Status: DISCONTINUED | OUTPATIENT
Start: 2017-11-20 | End: 2017-11-20

## 2017-11-20 RX ORDER — ONDANSETRON 8 MG/1
4 TABLET, FILM COATED ORAL EVERY 6 HOURS
Qty: 0 | Refills: 0 | Status: DISCONTINUED | OUTPATIENT
Start: 2017-11-20 | End: 2017-11-21

## 2017-11-20 RX ORDER — SODIUM CHLORIDE 9 MG/ML
1000 INJECTION INTRAMUSCULAR; INTRAVENOUS; SUBCUTANEOUS
Qty: 0 | Refills: 0 | Status: DISCONTINUED | OUTPATIENT
Start: 2017-11-20 | End: 2017-11-21

## 2017-11-20 RX ORDER — LEVETIRACETAM 250 MG/1
1000 TABLET, FILM COATED ORAL
Qty: 0 | Refills: 0 | Status: DISCONTINUED | OUTPATIENT
Start: 2017-11-20 | End: 2017-11-20

## 2017-11-20 RX ORDER — AMLODIPINE BESYLATE 2.5 MG/1
2.5 TABLET ORAL DAILY
Qty: 0 | Refills: 0 | Status: DISCONTINUED | OUTPATIENT
Start: 2017-11-20 | End: 2017-11-21

## 2017-11-20 RX ADMIN — Medication 4 MILLIGRAM(S): at 17:32

## 2017-11-20 RX ADMIN — KETAMINE HYDROCHLORIDE 50 MILLIGRAM(S): 100 INJECTION INTRAMUSCULAR; INTRAVENOUS at 17:32

## 2017-11-20 RX ADMIN — SODIUM CHLORIDE 150 MILLILITER(S): 9 INJECTION INTRAMUSCULAR; INTRAVENOUS; SUBCUTANEOUS at 23:33

## 2017-11-20 RX ADMIN — SODIUM CHLORIDE 2000 MILLILITER(S): 9 INJECTION INTRAMUSCULAR; INTRAVENOUS; SUBCUTANEOUS at 17:26

## 2017-11-20 RX ADMIN — MIDAZOLAM HYDROCHLORIDE 4 MILLIGRAM(S): 1 INJECTION, SOLUTION INTRAMUSCULAR; INTRAVENOUS at 17:32

## 2017-11-20 RX ADMIN — Medication 2 MILLIGRAM(S): at 17:32

## 2017-11-20 NOTE — ED ADULT TRIAGE NOTE - CHIEF COMPLAINT QUOTE
patient rushed from walk in triage to ambulance triage. patient family reports seizure activity. patient AMS and is post ictal with extreme agitation and is harm to self and other staff. MD arechiga at bedside with security detail. IV access obtained, Ativan 6mg IVP, versed 4mg IVP, Ketamine 100mg IVP given. Patient now calm, resting in stretcher, maintaining airway on 100% NRB. RSI prepared if needed, oral airway suctioned free of secretions.

## 2017-11-20 NOTE — ED PROVIDER NOTE - MEDICAL DECISION MAKING DETAILS
21 yo male known to the ED with PMH of seizure disorder presents to the ED s/p 4 witnessed seizures today. Post-ictal and violent/agitated in the ED. Required multiple security team members and sedation for patient safety as he was trying to bang his head against stretcher and hit staff. Patient compliant on home seizure meds per girlfriend. Plan: sedation, observation, labs including levels, CPK, IV fluid

## 2017-11-20 NOTE — H&P ADULT - NSHPPHYSICALEXAM_GEN_ALL_CORE
Vital Signs Last 24 Hrs  T(C): 36.8 (20 Nov 2017 17:01), Max: 36.8 (20 Nov 2017 17:01)  T(F): 98.3 (20 Nov 2017 17:01), Max: 98.3 (20 Nov 2017 17:01)  HR: 72 (20 Nov 2017 19:25) (72 - 101)  BP: 159/94 (20 Nov 2017 19:25) (115/59 - 166/67)  BP(mean): --  RR: 18 (20 Nov 2017 19:25) (17 - 28)  SpO2: 99% (20 Nov 2017 19:25) (99% - 100%)

## 2017-11-20 NOTE — ED PROVIDER NOTE - ATTENDING CONTRIBUTION TO CARE
none I, Bryan Lincoln, performed the initial face to face bedside interview with this patient regarding history of present illness, review of symptoms and relevant past medical, social and family history.  I completed an independent physical examination.  I was the initial provider who evaluated this patient. I have signed out the follow up of any pending tests (i.e. labs, radiological studies) to the resident.  I have communicated the patient’s plan of care and disposition with the resident.

## 2017-11-20 NOTE — H&P ADULT - HISTORY OF PRESENT ILLNESS
A 23 yo male with pmhx of seizure was brought to the ED by his girlfriend because the patient had multiple episodes of seizures. The history was obtained from the patient girlfriend. She stated that the patient had seizure around 12:36 in afternoon and the episode lasted for 4 minutes. Prior to the onset of the seizure the patient was laying down on couch complaining of feeling tired and sluggish. After the episode of seizure the patient woke up from the couch and confused his girlfriend for his mother and had an episode of urinary incontinence  . The patient than walked around the  house and stumbled and fell but did not hit his head. His girlfriend gave him two acetaminophen tablets.  The girlfriend states the patient has been having seizure for more than 2 years s/p injuries from football. Girlfriend states he has had multiple concussions. The patient was just recently admitted to [facility] for same complaint. During that hospitalization they changed the patient medication from Keppra to Depakote as well as Seroquel to aid with his insomnia. Girl friend states the patient does use marijuana to aide him with seizure and help him relax.  The patient has been compliant with his medication since July. Prior to july the patient was noncompliant with his medication because he was under the impression his insurance will not pay for it. A 23 yo male with pmhx of seizure was brought to the ED by his girlfriend because the patient had multiple episodes of seizures. The history was obtained from the patient girlfriend. She stated that the patient had seizure around 12:36 in afternoon and the episode lasted for 4 minutes. Prior to the onset of the seizure the patient was laying down on couch complaining of feeling tired and sluggish. After the episode of seizure the patient woke up from the couch and confused his girlfriend for his mother and had an episode of urinary incontinence  . The patient than walked around the  house and stumbled and fell but did not hit his head. His girlfriend gave him two acetaminophen tablets.  The girlfriend states the patient has been having seizure for more than 2 years s/p injuries from football. Girlfriend states he has had multiple concussions. The patient was just recently admitted to [facility] for same complaint. During that hospitalization they changed the patient medication from Keppra to Depakote as well as Seroquel to aid with his insomnia. Girl friend states the patient does use marijuana to aide him with seizure and help him relax.  The patient has been compliant with his medication since July. Prior to july the patient was noncompliant with his medication because he was under the impression his insurance will not pay for it. Denies chest pain, n/v/d, palpitations, gu/gi sx.

## 2017-11-20 NOTE — H&P ADULT - NSHPLABSRESULTS_GEN_ALL_CORE
16.9   13.6   )----------(  261       ( 20 Nov 2017 18:22 )               53.0      144    |  95     |  10.0   ----------------------------<  115        ( 20 Nov 2017 18:22 )  3.8     |  10.0   |  1.29     Ca    10.0       ( 20 Nov 2017 18:22 )    TPro  8.8    /  Alb  5.2    /  TBili  0.6    /  DBili  x      /  AST  29     /  ALT  30     /  AlkPhos  82     ( 20 Nov 2017 18:22 )    LIVER FUNCTIONS - ( 20 Nov 2017 18:22 )  Alb: 5.2 g/dL / Pro: 8.8 g/dL / ALK PHOS: 82 U/L / ALT: 30 U/L / AST: 29 U/L / GGT: x               CAPILLARY BLOOD GLUCOSE    CARDIAC MARKERS ( 20 Nov 2017 18:22 )  x     / x     / 960 U/L / x     / 6.6 ng/mL

## 2017-11-20 NOTE — H&P ADULT - RS GEN PE MLT RESP DETAILS PC
no rhonchi/normal/respirations non-labored/no rales/clear to auscultation bilaterally/no intercostal retractions/airway patent/breath sounds equal

## 2017-11-20 NOTE — H&P ADULT - PROBLEM SELECTOR PLAN 2
IVF fluids- Normal saline at 150ml/hr  - will monitor potassium level. Will repeat BMP in the morning. IVF fluids- Normal saline at 150ml/hr  - will monitor potassium level. Will repeat CMP in the morning. IVF fluids- Normal saline at 150ml/hr  - will monitor CK level.   Will repeat CMP in the morning.

## 2017-11-20 NOTE — ED PROVIDER NOTE - OBJECTIVE STATEMENT
21 yo male with PMH of seizure disorder, on depakote and previously on Seraquil, presents to the ED by car, girlfriend drove him in, for seizures. Girlfriend states he had 4 seizures today. First one from 12:26-12:30pm, second at 3pm, third at 4pm, fourth seizure around 4:30pm. Patient has had multiple seizures in the past. Compliant with his medications, last took this morning. In ED now with AMS and is post ictal with extreme agitation and is harm to self and other staff. MD arechiga at bedside with security detail. IV access obtained, Ativan 6mg IVP, versed 4mg IVP, Ketamine 100mg IVP given. Patient now calm, resting in stretcher, maintaining airway on 100% NRB. RSI prepared if needed, oral airway suctioned free of secretions. Girlfriend at bedside. No apparent injuries to patient on arrival to ED. Patient admitted for rhabdomyolysis secondary to seizures last time.     PMD: Sally Quick [phone]  Neurologist: Avelino

## 2017-11-20 NOTE — H&P ADULT - NSHPSOCIALHISTORY_GEN_ALL_CORE
Patient works at [employer]. Lives with his girlfriend. Patient does not consume tobacco products nor alcohol. He does smoke marijuana.

## 2017-11-20 NOTE — H&P ADULT - ASSESSMENT
A 23 yo male presented to the Ed with complaints because of Seizure. Patient was admitted for Seizure.                  Seizure 2/2 s/p concussion and trama. A 23 yo male presented to the Ed with complaints because of Seizure. Patient was admitted for breathrough Seizure despite medication compliance.

## 2017-11-20 NOTE — H&P ADULT - PROBLEM SELECTOR PLAN 1
Admit patient to Hospitalist services to step down unit with cardiac and  monitor . Vital signs q 4, Neuro check Q2. Diet NPO. Aspiration precaution as well fall precaution.  - Will continue patient on home medication. Depakote [id number] tablets BID  - Urine tox  - ABG with lyts  - CMP , Mg and Phos for the AM Admit patient to Hospitalist services to step down unit with cardiac and  monitor . Vital signs q 4, Neuro check Q2. Diet NPO. Activity ambulate with assistance.    Aspiration precaution as well fall precaution protocol.   - Will continue patient on home medication. Depakote [id number] tablets BID  - Urine tox  - ABG with lyts  - CMP , Mg and Phos for the AM Admit patient to Hospitalist services to step down unit with cardiac and  monitor . Vital signs q 4, Neuro check Q2. Diet NPO. Activity ambulate with assistance.    Aspiration precaution as well fall precaution protocol.   - Will continue patient on home medication. Depakote 1000mg BID, Keppra 1000mg BID  - Urine tox  - ABG with lyts  - CMP , Mg and Phos for the AM Admit patient to Hospitalist services to step down unit with cardiac and  monitor .   breathru seizures concerning for noncompliance vs infectious etiology   pt restarted on home meds and monitored overnight  consider neuro consult in AM  Vital signs q 4  Neuro check Q2.   Diet NPO.   Activity ambulate with assistance.   Aspiration precaution as well fall precaution protocol.   Will continue patient on home medication:[id number] BID,[id number] BID  - Urine tox  - ABG with lytes  - CMP, cbc with diff, Mg and Phos for the AM  -f/u chest xray, u/a  marijuana cessation counseling provided

## 2017-11-21 ENCOUNTER — TRANSCRIPTION ENCOUNTER (OUTPATIENT)
Age: 22
End: 2017-11-21

## 2017-11-21 VITALS
OXYGEN SATURATION: 97 % | RESPIRATION RATE: 16 BRPM | DIASTOLIC BLOOD PRESSURE: 76 MMHG | SYSTOLIC BLOOD PRESSURE: 132 MMHG | HEART RATE: 90 BPM | TEMPERATURE: 99 F

## 2017-11-21 LAB
ALBUMIN SERPL ELPH-MCNC: 4.1 G/DL — SIGNIFICANT CHANGE UP (ref 3.3–5.2)
ALP SERPL-CCNC: 57 U/L — SIGNIFICANT CHANGE UP (ref 40–120)
ALT FLD-CCNC: 23 U/L — SIGNIFICANT CHANGE UP
AMPHET UR-MCNC: NEGATIVE — SIGNIFICANT CHANGE UP
ANION GAP SERPL CALC-SCNC: 17 MMOL/L — SIGNIFICANT CHANGE UP (ref 5–17)
APPEARANCE UR: CLEAR — SIGNIFICANT CHANGE UP
APTT BLD: 25.6 SEC — LOW (ref 27.5–37.4)
AST SERPL-CCNC: 32 U/L — SIGNIFICANT CHANGE UP
BARBITURATES UR SCN-MCNC: NEGATIVE — SIGNIFICANT CHANGE UP
BENZODIAZ UR-MCNC: POSITIVE
BILIRUB SERPL-MCNC: 0.9 MG/DL — SIGNIFICANT CHANGE UP (ref 0.4–2)
BILIRUB UR-MCNC: NEGATIVE — SIGNIFICANT CHANGE UP
BUN SERPL-MCNC: 11 MG/DL — SIGNIFICANT CHANGE UP (ref 8–20)
CALCIUM SERPL-MCNC: 9 MG/DL — SIGNIFICANT CHANGE UP (ref 8.6–10.2)
CHLORIDE SERPL-SCNC: 101 MMOL/L — SIGNIFICANT CHANGE UP (ref 98–107)
CK MB CFR SERPL CALC: 7.8 NG/ML — HIGH (ref 0–6.7)
CK SERPL-CCNC: 1622 U/L — HIGH (ref 30–200)
CO2 SERPL-SCNC: 22 MMOL/L — SIGNIFICANT CHANGE UP (ref 22–29)
COCAINE METAB.OTHER UR-MCNC: NEGATIVE — SIGNIFICANT CHANGE UP
COLOR SPEC: YELLOW — SIGNIFICANT CHANGE UP
CREAT SERPL-MCNC: 1.29 MG/DL — SIGNIFICANT CHANGE UP (ref 0.5–1.3)
DIFF PNL FLD: NEGATIVE — SIGNIFICANT CHANGE UP
EOSINOPHIL # BLD AUTO: 0 K/UL — SIGNIFICANT CHANGE UP (ref 0–0.5)
EOSINOPHIL NFR BLD AUTO: 0 % — SIGNIFICANT CHANGE UP (ref 0–5)
GLUCOSE SERPL-MCNC: 92 MG/DL — SIGNIFICANT CHANGE UP (ref 70–115)
GLUCOSE UR QL: NEGATIVE MG/DL — SIGNIFICANT CHANGE UP
HCT VFR BLD CALC: 42 % — SIGNIFICANT CHANGE UP (ref 42–52)
HGB BLD-MCNC: 14 G/DL — SIGNIFICANT CHANGE UP (ref 14–18)
INR BLD: 1.06 RATIO — SIGNIFICANT CHANGE UP (ref 0.88–1.16)
KETONES UR-MCNC: NEGATIVE — SIGNIFICANT CHANGE UP
LEUKOCYTE ESTERASE UR-ACNC: NEGATIVE — SIGNIFICANT CHANGE UP
LYMPHOCYTES # BLD AUTO: 16 % — LOW (ref 20–55)
LYMPHOCYTES # BLD AUTO: 2 K/UL — SIGNIFICANT CHANGE UP (ref 1–4.8)
MAGNESIUM SERPL-MCNC: 2.4 MG/DL — SIGNIFICANT CHANGE UP (ref 1.8–2.6)
MCHC RBC-ENTMCNC: 27.8 PG — SIGNIFICANT CHANGE UP (ref 27–31)
MCHC RBC-ENTMCNC: 33.3 G/DL — SIGNIFICANT CHANGE UP (ref 32–36)
MCV RBC AUTO: 83.5 FL — SIGNIFICANT CHANGE UP (ref 80–94)
METHADONE UR-MCNC: NEGATIVE — SIGNIFICANT CHANGE UP
MONOCYTES # BLD AUTO: 1.1 K/UL — HIGH (ref 0–0.8)
MONOCYTES NFR BLD AUTO: 8.6 % — SIGNIFICANT CHANGE UP (ref 3–10)
NEUTROPHILS # BLD AUTO: 9.3 K/UL — HIGH (ref 1.8–8)
NEUTROPHILS NFR BLD AUTO: 75.2 % — HIGH (ref 37–73)
NITRITE UR-MCNC: NEGATIVE — SIGNIFICANT CHANGE UP
OPIATES UR-MCNC: NEGATIVE — SIGNIFICANT CHANGE UP
PCP SPEC-MCNC: SIGNIFICANT CHANGE UP
PCP UR-MCNC: NEGATIVE — SIGNIFICANT CHANGE UP
PH UR: 6 — SIGNIFICANT CHANGE UP (ref 5–8)
PHOSPHATE SERPL-MCNC: 3.2 MG/DL — SIGNIFICANT CHANGE UP (ref 2.4–4.7)
PLATELET # BLD AUTO: 188 K/UL — SIGNIFICANT CHANGE UP (ref 150–400)
POTASSIUM SERPL-MCNC: 3.6 MMOL/L — SIGNIFICANT CHANGE UP (ref 3.5–5.3)
POTASSIUM SERPL-SCNC: 3.6 MMOL/L — SIGNIFICANT CHANGE UP (ref 3.5–5.3)
PROT SERPL-MCNC: 6.9 G/DL — SIGNIFICANT CHANGE UP (ref 6.6–8.7)
PROT UR-MCNC: NEGATIVE MG/DL — SIGNIFICANT CHANGE UP
PROTHROM AB SERPL-ACNC: 11.7 SEC — SIGNIFICANT CHANGE UP (ref 9.8–12.7)
RBC # BLD: 5.03 M/UL — SIGNIFICANT CHANGE UP (ref 4.6–6.2)
RBC # FLD: 13.6 % — SIGNIFICANT CHANGE UP (ref 11–15.6)
SODIUM SERPL-SCNC: 140 MMOL/L — SIGNIFICANT CHANGE UP (ref 135–145)
SP GR SPEC: 1.01 — SIGNIFICANT CHANGE UP (ref 1.01–1.02)
THC UR QL: POSITIVE
UROBILINOGEN FLD QL: NEGATIVE MG/DL — SIGNIFICANT CHANGE UP
WBC # BLD: 12.4 K/UL — HIGH (ref 4.8–10.8)
WBC # FLD AUTO: 12.4 K/UL — HIGH (ref 4.8–10.8)

## 2017-11-21 PROCEDURE — 83735 ASSAY OF MAGNESIUM: CPT

## 2017-11-21 PROCEDURE — 84295 ASSAY OF SERUM SODIUM: CPT

## 2017-11-21 PROCEDURE — 85014 HEMATOCRIT: CPT

## 2017-11-21 PROCEDURE — 85730 THROMBOPLASTIN TIME PARTIAL: CPT

## 2017-11-21 PROCEDURE — 83605 ASSAY OF LACTIC ACID: CPT

## 2017-11-21 PROCEDURE — 99291 CRITICAL CARE FIRST HOUR: CPT | Mod: 25

## 2017-11-21 PROCEDURE — 82435 ASSAY OF BLOOD CHLORIDE: CPT

## 2017-11-21 PROCEDURE — 93005 ELECTROCARDIOGRAM TRACING: CPT

## 2017-11-21 PROCEDURE — 82330 ASSAY OF CALCIUM: CPT

## 2017-11-21 PROCEDURE — 82553 CREATINE MB FRACTION: CPT

## 2017-11-21 PROCEDURE — 81003 URINALYSIS AUTO W/O SCOPE: CPT

## 2017-11-21 PROCEDURE — 80164 ASSAY DIPROPYLACETIC ACD TOT: CPT

## 2017-11-21 PROCEDURE — 82947 ASSAY GLUCOSE BLOOD QUANT: CPT

## 2017-11-21 PROCEDURE — 96375 TX/PRO/DX INJ NEW DRUG ADDON: CPT

## 2017-11-21 PROCEDURE — 82550 ASSAY OF CK (CPK): CPT

## 2017-11-21 PROCEDURE — 84132 ASSAY OF SERUM POTASSIUM: CPT

## 2017-11-21 PROCEDURE — 96374 THER/PROPH/DIAG INJ IV PUSH: CPT

## 2017-11-21 PROCEDURE — 84100 ASSAY OF PHOSPHORUS: CPT

## 2017-11-21 PROCEDURE — 80307 DRUG TEST PRSMV CHEM ANLYZR: CPT

## 2017-11-21 PROCEDURE — 82803 BLOOD GASES ANY COMBINATION: CPT

## 2017-11-21 PROCEDURE — 36415 COLL VENOUS BLD VENIPUNCTURE: CPT

## 2017-11-21 PROCEDURE — 85610 PROTHROMBIN TIME: CPT

## 2017-11-21 PROCEDURE — 99239 HOSP IP/OBS DSCHRG MGMT >30: CPT

## 2017-11-21 PROCEDURE — 85027 COMPLETE CBC AUTOMATED: CPT

## 2017-11-21 PROCEDURE — 80053 COMPREHEN METABOLIC PANEL: CPT

## 2017-11-21 RX ORDER — DIVALPROEX SODIUM 500 MG/1
2 TABLET, DELAYED RELEASE ORAL
Qty: 120 | Refills: 0 | OUTPATIENT
Start: 2017-11-21 | End: 2017-12-21

## 2017-11-21 RX ORDER — AMLODIPINE BESYLATE 2.5 MG/1
1 TABLET ORAL
Qty: 30 | Refills: 0 | OUTPATIENT
Start: 2017-11-21 | End: 2017-12-21

## 2017-11-21 RX ADMIN — LEVETIRACETAM 1000 MILLIGRAM(S): 250 TABLET, FILM COATED ORAL at 07:13

## 2017-11-21 RX ADMIN — DIVALPROEX SODIUM 1000 MILLIGRAM(S): 500 TABLET, DELAYED RELEASE ORAL at 07:13

## 2017-11-21 RX ADMIN — AMLODIPINE BESYLATE 2.5 MILLIGRAM(S): 2.5 TABLET ORAL at 07:13

## 2017-11-21 NOTE — DISCHARGE NOTE ADULT - OTHER SIGNIFICANT FINDINGS
11-21    140  |  101  |  11.0  ----------------------------<  92  3.6   |  22.0  |  1.29    Ca    9.0      21 Nov 2017 08:28  Phos  3.2     11-21  Mg     2.4     11-21    TPro  6.9  /  Alb  4.1  /  TBili  0.9  /  DBili  x   /  AST  32  /  ALT  23  /  AlkPhos  57  11-21                          14.0   12.4  )-----------( 188      ( 21 Nov 2017 08:28 )             42.0     LIVER FUNCTIONS - ( 21 Nov 2017 08:28 )  Alb: 4.1 g/dL / Pro: 6.9 g/dL / ALK PHOS: 57 U/L / ALT: 23 U/L / AST: 32 U/L / GGT: x           PT/INR - ( 21 Nov 2017 08:28 )   PT: 11.7 sec;   INR: 1.06 ratio         PTT - ( 21 Nov 2017 08:28 )  PTT:25.6 sec    Creatine Kinase, Serum in AM (11.21.17 @ 08:28)    Creatine Kinase, Serum: 1622 U/L    Urinalysis (11.21.17 @ 04:00)    Glucose Qualitative, Urine: Negative mg/dL    Blood, Urine: Negative    pH Urine: 6.0    Color: Yellow    Urine Appearance: Clear    Bilirubin: Negative    Ketone - Urine: Negative    Specific Gravity: 1.010    Protein, Urine: Negative mg/dL    Urobilinogen: Negative mg/dL    Nitrite: Negative    Leukocyte Esterase Concentration: Negative

## 2017-11-21 NOTE — PROGRESS NOTE ADULT - PROBLEM SELECTOR PLAN 3
asymptomatic  goal BP <130/80 now, close to controlled but could be doing better  inc home norvasc from 2.5 to 5mg daily  BP check in office with PMD  low salt diet

## 2017-11-21 NOTE — DISCHARGE NOTE ADULT - CARE PROVIDER_API CALL
Sally Quick), Internal Medicine  [address] [place] [address]  Phone: [phone]  Fax: [phone]    Aniket Sinha), Neurology  [address] [place] [address]  Phone: [phone]  Fax: [phone]

## 2017-11-21 NOTE — ED ADULT NURSE REASSESSMENT NOTE - NS ED NURSE REASSESS COMMENT FT1
20g underside LFA infiltrated and removed,  additional IV sites obtained  NS infusing  sites positional
assumed care of pt at this time  pt sleeping (s/p sedation medication) GF at bedside   brought pts bags to car  IVF 20g R under forearm with NS 2l infusing as ordered.   CM shows NSR90s  vitals stable,  sats 95-98 3l nc    waiting bed avail.
pts gf states 'he has a seizure about once a week'  'he has had a lot of concussions from football that's why he has a seizure disorder'
Assumed care of pt. pt resting comfortably in bed, responds to verbal stimuli with girlfriend at bedside. girlfriend states pt had seizure today, "has seizures weekly cause of smoking pot." pt in no apparent pain, discomfort or distress. maintaining 02 on 3L NC, NSR on monitor. bilat lung sounds clear, resp even and unlabored. abd soft nontender nondistended. NS bolus infusing to L wrist and L forearm without difficulty. pt admitted, awaiting bed, will continue to monitor.
confirmed with MD that pt is okay to go to 2 GUL  and MON and does not need step down
pt awake and alert, responds to verbal stimuli. gf at bedside, states pt is more awake and seems to be getting better. pt denies chest pain or SOB. VSS, awaiting bed. will continue to monitor.
pt waking up looked around and went back to sleep  NS conts to infuse   02 3 l in use  GF states 'everytime after he seizes he throws up' 'he hasn't thrown up at all'    pt opens eyes to name and thenf alls back to sleep"

## 2017-11-21 NOTE — DISCHARGE NOTE ADULT - PATIENT PORTAL LINK FT
“You can access the [employer] Patient Portal, offered by [facility], by registering with the following website: [email or web address]”

## 2017-11-21 NOTE — PROGRESS NOTE ADULT - PROBLEM SELECTOR PLAN 2
worsened this AM, but asymptomatic  secondary to recent seizure  pt is young and otherwise healthy, no renal dysfunction, will encourage him to drink lots of o2 at home, follow up with pmd within 1 wk

## 2017-11-21 NOTE — DISCHARGE NOTE ADULT - INSTRUCTIONS
Low Salt  Be sure to drink LOTS of water! You had some mild muscle injury from your seizure and need to flush it out of your system, drink at LEAST 8-10glasses daily

## 2017-11-21 NOTE — PROGRESS NOTE ADULT - NSHPATTENDINGPLANDISCUSS_GEN_ALL_CORE
the patient + wife.  All imaging and results of lab/other studies reviewed by me. All questions answered to the satisfaction of the patient. At this time they agree with the current plan of therapy.

## 2017-11-21 NOTE — PROGRESS NOTE ADULT - SUBJECTIVE AND OBJECTIVE BOX
CHIEF COMPLAINT: seizure    Patient seen and examined at the bedside. No acute overnight events. Complaining of nothing this AM. Denies fever/chills, headache, lightheadedness, dizziness, chest pain, palpitations, shortness of breath, cough, abd pain, nausea/vomiting/diarrhea, muscle pain, seizure activity      =========================================================================================  T(C): 37.4 (17 @ 11:21), Max: 37.4 (17 @ 11:21)  HR: 90 (17 @ 11:21) (72 - 101)  BP: 132/76 (17 @ 11:21) (115/59 - 166/67)  RR: 16 (17 @ 11:21) (16 - 28)  SpO2: 97% (17 @ 11:21) (96% - 100%)    PHYSICAL EXAM.    GEN - appears age appropriate. well nourished. pleasant. no distress.   HEENT - NCAT, EOMI, ELOISE  RESP - CTA BL, no wheeze/stridor/rhonchi/crackles. not on supplemental O2. able to speak in full sentences without distress.   CARDIO - NS1S2, RRR. No murmurs/rubs/gallops.  ABD - Soft/Non tender/Non distended. Normal BS x4 quadrants. no guarding/rebound tenderness.  Ext - No REYNALDO.  MSK - BL 5/5 strength on upper and lower extremities.   Neuro - AAOx3. cn 2-12 grossly intact  Psych - normal affect  Skin - c/d/i. no rashes/lesions      I&O's Summary    Daily Height in cm: 195.58 (2017 17:01)    Daily     =========================================================================================  LABS.        140  |  101  |  11.0  ----------------------------<  92  3.6   |  22.0  |  1.29    Ca    9.0      2017 08:28  Phos  3.2       Mg     2.4         TPro  6.9  /  Alb  4.1  /  TBili  0.9  /  DBili  x   /  AST  32  /  ALT  23  /  AlkPhos  57                            14.0   12.4  )-----------( 188      ( 2017 08:28 )             42.0     LIVER FUNCTIONS - ( 2017 08:28 )  Alb: 4.1 g/dL / Pro: 6.9 g/dL / ALK PHOS: 57 U/L / ALT: 23 U/L / AST: 32 U/L / GGT: x           PT/INR - ( 2017 08:28 )   PT: 11.7 sec;   INR: 1.06 ratio         PTT - ( 2017 08:28 )  PTT:25.6 sec  CARDIAC MARKERS ( 2017 08:28 )  x     / x     / 1622 U/L / x     / 7.8 ng/mL  CARDIAC MARKERS ( 2017 18:22 )  x     / x     / 960 U/L / x     / 6.6 ng/mL      Urinalysis Basic - ( 2017 04:00 )    Color: Yellow / Appearance: Clear / S.010 / pH: x  Gluc: x / Ketone: Negative  / Bili: Negative / Urobili: Negative mg/dL   Blood: x / Protein: Negative mg/dL / Nitrite: Negative   Leuk Esterase: Negative / RBC: x / WBC x   Sq Epi: x / Non Sq Epi: x / Bacteria: x      ABG - ( 2017 19:51 )  pH: 7.37  /  pCO2: 40    /  pO2: 174   / HCO3: 23    / Base Excess: -2.1  /  SaO2: 99                  =========================================================================================  IMAGING.     =========================================================================================    MEDICATIONS  (STANDING):  amLODIPine   Tablet 2.5 milliGRAM(s) Oral daily  diVALproex DR 1000 milliGRAM(s) Oral two times a day  sodium chloride 0.9%. 1000 milliLiter(s) (150 mL/Hr) IV Continuous <Continuous>    MEDICATIONS  (PRN):  LORazepam   Injectable 2 milliGRAM(s) IV Push every 5 minutes PRN Seizure  ondansetron Injectable 4 milliGRAM(s) IV Push every 6 hours PRN Nausea

## 2017-11-21 NOTE — DISCHARGE NOTE ADULT - PLAN OF CARE
prevention Kindly take all antiseizure medications as prescribed. It is very important that you never miss a dose to prevent you from having a breakthrough seizure. Be sure to look out for warnings or signs of seizure such as tongue biting, incontinence, shaking, eye rolling/ shaking, generalized shaking, twitching, and acute confusion or amnesia. If these things happen, please alert a healthcare professional immediately and/or come to the ER. Be sure to make an appointment and follow up with Neurology within the week. BP <130/80 We have increased your blood pressure medication dose from 2.5mg to 5mg, be sure to take this new dose. Be sure to follow a low salt diet. Be sure to take medications every day as prescribed and do not miss and doses, the medications do not work if they are not taken consistently. Follow up with your Primary Care Doctor and have your Blood Pressure checked. resolution Drink at least 8 - 10 glasses of water daily. If you have persistent or worsening muscle pain be sure to see your Primary care doctor

## 2017-11-21 NOTE — PROGRESS NOTE ADULT - PROBLEM SELECTOR PLAN 1
in setting of hx of seizure disorder  was started on depakote but given only 3d supply in nov, believe seizure due to missing meds  was due to follow up with Dr. Nolan for refill of meds in office  meds clarified with [facility] + pt, he is no longer on keppra, only on depakote 1000mg bid  restarted in hospital, he has done well  will dc keppra, continue depakote  pt will have follow up meeting with neuro in office this week  will prescribe all meds  dc home today

## 2017-11-21 NOTE — DISCHARGE NOTE ADULT - MEDICATION SUMMARY - MEDICATIONS TO TAKE
I will START or STAY ON the medications listed below when I get home from the hospital:    divalproex sodium 500 mg oral delayed release tablet  -- 2 tab(s) by mouth 2 times a day  -- Indication: For Seizures    amLODIPine 5 mg oral tablet  -- 1 tab(s) by mouth once a day   -- It is very important that you take or use this exactly as directed.  Do not skip doses or discontinue unless directed by your doctor.  Some non-prescription drugs may aggravate your condition.  Read all labels carefully.  If a warning appears, check with your doctor before taking.    -- Indication: For Hypertension

## 2017-11-21 NOTE — DISCHARGE NOTE ADULT - CARE PLAN
Principal Discharge DX:	Seizures  Goal:	prevention  Instructions for follow-up, activity and diet:	Kindly take all antiseizure medications as prescribed. It is very important that you never miss a dose to prevent you from having a breakthrough seizure. Be sure to look out for warnings or signs of seizure such as tongue biting, incontinence, shaking, eye rolling/ shaking, generalized shaking, twitching, and acute confusion or amnesia. If these things happen, please alert a healthcare professional immediately and/or come to the ER. Be sure to make an appointment and follow up with Neurology within the week.  Secondary Diagnosis:	Essential hypertension  Goal:	BP <130/80  Instructions for follow-up, activity and diet:	We have increased your blood pressure medication dose from 2.5mg to 5mg, be sure to take this new dose. Be sure to follow a low salt diet. Be sure to take medications every day as prescribed and do not miss and doses, the medications do not work if they are not taken consistently. Follow up with your Primary Care Doctor and have your Blood Pressure checked.  Secondary Diagnosis:	Non-traumatic rhabdomyolysis  Goal:	resolution  Instructions for follow-up, activity and diet:	Drink at least 8 - 10 glasses of water daily. If you have persistent or worsening muscle pain be sure to see your Primary care doctor

## 2017-11-21 NOTE — DISCHARGE NOTE ADULT - MEDICATION SUMMARY - MEDICATIONS TO STOP TAKING
I will STOP taking the medications listed below when I get home from the hospital:    amLODIPine 2.5 mg oral tablet  -- 1 tab(s) by mouth once a day    levETIRAcetam 1000 mg oral tablet  -- 1 tab(s) by mouth 2 times a day

## 2018-02-13 ENCOUNTER — EMERGENCY (EMERGENCY)
Facility: HOSPITAL | Age: 23
LOS: 1 days | Discharge: HOME | End: 2018-02-13
Attending: EMERGENCY MEDICINE | Admitting: EMERGENCY MEDICINE
Payer: COMMERCIAL

## 2018-02-13 VITALS
RESPIRATION RATE: 18 BRPM | TEMPERATURE: 98 F | DIASTOLIC BLOOD PRESSURE: 90 MMHG | OXYGEN SATURATION: 98 % | SYSTOLIC BLOOD PRESSURE: 156 MMHG | HEART RATE: 110 BPM | HEIGHT: 77 IN | WEIGHT: 259.93 LBS

## 2018-02-13 LAB
ANION GAP SERPL CALC-SCNC: 14 MMOL/L — SIGNIFICANT CHANGE UP (ref 5–17)
BUN SERPL-MCNC: 16 MG/DL — SIGNIFICANT CHANGE UP (ref 8–20)
CALCIUM SERPL-MCNC: 9.7 MG/DL — SIGNIFICANT CHANGE UP (ref 8.6–10.2)
CHLORIDE SERPL-SCNC: 99 MMOL/L — SIGNIFICANT CHANGE UP (ref 98–107)
CK MB CFR SERPL CALC: 4.8 NG/ML — SIGNIFICANT CHANGE UP (ref 0–6.7)
CK SERPL-CCNC: 2158 U/L — HIGH (ref 30–200)
CO2 SERPL-SCNC: 28 MMOL/L — SIGNIFICANT CHANGE UP (ref 22–29)
CREAT SERPL-MCNC: 0.92 MG/DL — SIGNIFICANT CHANGE UP (ref 0.5–1.3)
EOSINOPHIL # BLD AUTO: 0 K/UL — SIGNIFICANT CHANGE UP (ref 0–0.5)
EOSINOPHIL NFR BLD AUTO: 0.6 % — SIGNIFICANT CHANGE UP (ref 0–5)
GLUCOSE SERPL-MCNC: 79 MG/DL — SIGNIFICANT CHANGE UP (ref 70–115)
HCT VFR BLD CALC: 47.3 % — SIGNIFICANT CHANGE UP (ref 42–52)
HGB BLD-MCNC: 15.5 G/DL — SIGNIFICANT CHANGE UP (ref 14–18)
LYMPHOCYTES # BLD AUTO: 2.4 K/UL — SIGNIFICANT CHANGE UP (ref 1–4.8)
LYMPHOCYTES # BLD AUTO: 46 % — SIGNIFICANT CHANGE UP (ref 20–55)
MCHC RBC-ENTMCNC: 28.3 PG — SIGNIFICANT CHANGE UP (ref 27–31)
MCHC RBC-ENTMCNC: 32.8 G/DL — SIGNIFICANT CHANGE UP (ref 32–36)
MCV RBC AUTO: 86.3 FL — SIGNIFICANT CHANGE UP (ref 80–94)
MONOCYTES # BLD AUTO: 0.3 K/UL — SIGNIFICANT CHANGE UP (ref 0–0.8)
MONOCYTES NFR BLD AUTO: 5.6 % — SIGNIFICANT CHANGE UP (ref 3–10)
NEUTROPHILS # BLD AUTO: 2.5 K/UL — SIGNIFICANT CHANGE UP (ref 1.8–8)
NEUTROPHILS NFR BLD AUTO: 47.6 % — SIGNIFICANT CHANGE UP (ref 37–73)
PLATELET # BLD AUTO: 179 K/UL — SIGNIFICANT CHANGE UP (ref 150–400)
POTASSIUM SERPL-MCNC: 4.3 MMOL/L — SIGNIFICANT CHANGE UP (ref 3.5–5.3)
POTASSIUM SERPL-SCNC: 4.3 MMOL/L — SIGNIFICANT CHANGE UP (ref 3.5–5.3)
RBC # BLD: 5.48 M/UL — SIGNIFICANT CHANGE UP (ref 4.6–6.2)
RBC # FLD: 12.6 % — SIGNIFICANT CHANGE UP (ref 11–15.6)
SODIUM SERPL-SCNC: 141 MMOL/L — SIGNIFICANT CHANGE UP (ref 135–145)
VALPROATE SERPL-MCNC: 96.2 UG/ML — SIGNIFICANT CHANGE UP (ref 50–100)
WBC # BLD: 5.3 K/UL — SIGNIFICANT CHANGE UP (ref 4.8–10.8)
WBC # FLD AUTO: 5.3 K/UL — SIGNIFICANT CHANGE UP (ref 4.8–10.8)

## 2018-02-13 PROCEDURE — 36415 COLL VENOUS BLD VENIPUNCTURE: CPT

## 2018-02-13 PROCEDURE — 80048 BASIC METABOLIC PNL TOTAL CA: CPT

## 2018-02-13 PROCEDURE — 81001 URINALYSIS AUTO W/SCOPE: CPT

## 2018-02-13 PROCEDURE — 82553 CREATINE MB FRACTION: CPT

## 2018-02-13 PROCEDURE — 82550 ASSAY OF CK (CPK): CPT

## 2018-02-13 PROCEDURE — 80164 ASSAY DIPROPYLACETIC ACD TOT: CPT

## 2018-02-13 PROCEDURE — 99283 EMERGENCY DEPT VISIT LOW MDM: CPT

## 2018-02-13 PROCEDURE — 85027 COMPLETE CBC AUTOMATED: CPT

## 2018-02-13 PROCEDURE — 99284 EMERGENCY DEPT VISIT MOD MDM: CPT

## 2018-02-13 RX ORDER — SODIUM CHLORIDE 9 MG/ML
2000 INJECTION INTRAMUSCULAR; INTRAVENOUS; SUBCUTANEOUS ONCE
Qty: 0 | Refills: 0 | Status: COMPLETED | OUTPATIENT
Start: 2018-02-13 | End: 2018-02-13

## 2018-02-13 RX ADMIN — SODIUM CHLORIDE 4000 MILLILITER(S): 9 INJECTION INTRAMUSCULAR; INTRAVENOUS; SUBCUTANEOUS at 23:55

## 2018-02-13 NOTE — ED ADULT TRIAGE NOTE - CHIEF COMPLAINT QUOTE
pt alert and awake x3, BIBA c/o dizziness and seeing "spots" states has seizure hx, pt states this happens when he feels like he's going to have a seizure.

## 2018-02-13 NOTE — ED PROVIDER NOTE - OBJECTIVE STATEMENT
23 y/o M, with Hx of epilepsy, presents to the ED stating that he felt like he was about to have a seizure.  Last seizure was 1 week ago. Pt states that he is compliant with his Depakote.  denies fever. denies HA or neck pain. no chest pain or sob. no abd pain. no n/v/d. no urinary f/u/d. no back pain. no motor or sensory deficits. denies illicit drug use. no recent travel. no rash. no other acute issues symptoms or concerns

## 2018-02-13 NOTE — ED PROVIDER NOTE - PHYSICAL EXAMINATION
CN II-XII grossly in tact. Normal finger to nose. Normal heel to shin. No pronator drift. Normal gait without assistance. Reflexes 2+ bilaterally. Babinski negative bilaterally.

## 2018-02-14 VITALS
SYSTOLIC BLOOD PRESSURE: 152 MMHG | TEMPERATURE: 98 F | DIASTOLIC BLOOD PRESSURE: 92 MMHG | RESPIRATION RATE: 16 BRPM | OXYGEN SATURATION: 99 % | HEART RATE: 98 BPM

## 2018-02-14 LAB
APPEARANCE UR: CLEAR — SIGNIFICANT CHANGE UP
BILIRUB UR-MCNC: NEGATIVE — SIGNIFICANT CHANGE UP
COLOR SPEC: YELLOW — SIGNIFICANT CHANGE UP
DIFF PNL FLD: NEGATIVE — SIGNIFICANT CHANGE UP
EPI CELLS # UR: SIGNIFICANT CHANGE UP
GLUCOSE UR QL: NEGATIVE MG/DL — SIGNIFICANT CHANGE UP
KETONES UR-MCNC: ABNORMAL
LEUKOCYTE ESTERASE UR-ACNC: NEGATIVE — SIGNIFICANT CHANGE UP
NITRITE UR-MCNC: NEGATIVE — SIGNIFICANT CHANGE UP
PH UR: 7 — SIGNIFICANT CHANGE UP (ref 5–8)
PROT UR-MCNC: 15 MG/DL
SP GR SPEC: 1.01 — SIGNIFICANT CHANGE UP (ref 1.01–1.02)
UROBILINOGEN FLD QL: 1 MG/DL

## 2018-03-23 ENCOUNTER — INPATIENT (INPATIENT)
Facility: HOSPITAL | Age: 23
LOS: 2 days | Discharge: HOME | DRG: 101 | End: 2018-03-26
Attending: INTERNAL MEDICINE | Admitting: HOSPITALIST
Payer: COMMERCIAL

## 2018-03-23 VITALS — HEIGHT: 77 IN | WEIGHT: 250 LBS

## 2018-03-23 DIAGNOSIS — R56.9 UNSPECIFIED CONVULSIONS: ICD-10-CM

## 2018-03-23 LAB
ANION GAP SERPL CALC-SCNC: 13 MMOL/L — SIGNIFICANT CHANGE UP (ref 5–17)
APTT BLD: 25 SEC — LOW (ref 27.5–37.4)
BUN SERPL-MCNC: 15 MG/DL — SIGNIFICANT CHANGE UP (ref 8–20)
CALCIUM SERPL-MCNC: 9.7 MG/DL — SIGNIFICANT CHANGE UP (ref 8.6–10.2)
CHLORIDE SERPL-SCNC: 101 MMOL/L — SIGNIFICANT CHANGE UP (ref 98–107)
CO2 SERPL-SCNC: 28 MMOL/L — SIGNIFICANT CHANGE UP (ref 22–29)
CREAT SERPL-MCNC: 1.26 MG/DL — SIGNIFICANT CHANGE UP (ref 0.5–1.3)
GLUCOSE SERPL-MCNC: 95 MG/DL — SIGNIFICANT CHANGE UP (ref 70–115)
HCT VFR BLD CALC: 49.3 % — SIGNIFICANT CHANGE UP (ref 42–52)
HGB BLD-MCNC: 16.5 G/DL — SIGNIFICANT CHANGE UP (ref 14–18)
INR BLD: 1.05 RATIO — SIGNIFICANT CHANGE UP (ref 0.88–1.16)
MCHC RBC-ENTMCNC: 28.9 PG — SIGNIFICANT CHANGE UP (ref 27–31)
MCHC RBC-ENTMCNC: 33.5 G/DL — SIGNIFICANT CHANGE UP (ref 32–36)
MCV RBC AUTO: 86.5 FL — SIGNIFICANT CHANGE UP (ref 80–94)
PLATELET # BLD AUTO: 178 K/UL — SIGNIFICANT CHANGE UP (ref 150–400)
POTASSIUM SERPL-MCNC: 4.8 MMOL/L — SIGNIFICANT CHANGE UP (ref 3.5–5.3)
POTASSIUM SERPL-SCNC: 4.8 MMOL/L — SIGNIFICANT CHANGE UP (ref 3.5–5.3)
PROTHROM AB SERPL-ACNC: 11.6 SEC — SIGNIFICANT CHANGE UP (ref 9.8–12.7)
RBC # BLD: 5.7 M/UL — SIGNIFICANT CHANGE UP (ref 4.6–6.2)
RBC # FLD: 13 % — SIGNIFICANT CHANGE UP (ref 11–15.6)
SODIUM SERPL-SCNC: 142 MMOL/L — SIGNIFICANT CHANGE UP (ref 135–145)
VALPROATE SERPL-MCNC: 100.6 UG/ML — HIGH (ref 50–100)
WBC # BLD: 10.8 K/UL — SIGNIFICANT CHANGE UP (ref 4.8–10.8)
WBC # FLD AUTO: 10.8 K/UL — SIGNIFICANT CHANGE UP (ref 4.8–10.8)

## 2018-03-23 PROCEDURE — 71045 X-RAY EXAM CHEST 1 VIEW: CPT | Mod: 26

## 2018-03-23 PROCEDURE — 99223 1ST HOSP IP/OBS HIGH 75: CPT

## 2018-03-23 PROCEDURE — 99285 EMERGENCY DEPT VISIT HI MDM: CPT

## 2018-03-23 PROCEDURE — 70450 CT HEAD/BRAIN W/O DYE: CPT | Mod: 26

## 2018-03-23 RX ORDER — LACOSAMIDE 50 MG/1
200 TABLET ORAL ONCE
Qty: 0 | Refills: 0 | Status: DISCONTINUED | OUTPATIENT
Start: 2018-03-23 | End: 2018-03-23

## 2018-03-23 RX ORDER — CLONAZEPAM 1 MG
0.25 TABLET ORAL AT BEDTIME
Qty: 0 | Refills: 0 | Status: DISCONTINUED | OUTPATIENT
Start: 2018-03-23 | End: 2018-03-26

## 2018-03-23 RX ORDER — SODIUM CHLORIDE 9 MG/ML
3 INJECTION INTRAMUSCULAR; INTRAVENOUS; SUBCUTANEOUS ONCE
Qty: 0 | Refills: 0 | Status: COMPLETED | OUTPATIENT
Start: 2018-03-23 | End: 2018-03-23

## 2018-03-23 RX ORDER — INFLUENZA VIRUS VACCINE 15; 15; 15; 15 UG/.5ML; UG/.5ML; UG/.5ML; UG/.5ML
0.5 SUSPENSION INTRAMUSCULAR ONCE
Qty: 0 | Refills: 0 | Status: COMPLETED | OUTPATIENT
Start: 2018-03-23 | End: 2018-03-23

## 2018-03-23 RX ORDER — ACETAMINOPHEN 500 MG
650 TABLET ORAL EVERY 6 HOURS
Qty: 0 | Refills: 0 | Status: DISCONTINUED | OUTPATIENT
Start: 2018-03-23 | End: 2018-03-26

## 2018-03-23 RX ORDER — DIVALPROEX SODIUM 500 MG/1
1000 TABLET, DELAYED RELEASE ORAL
Qty: 0 | Refills: 0 | Status: DISCONTINUED | OUTPATIENT
Start: 2018-03-23 | End: 2018-03-26

## 2018-03-23 RX ORDER — PANTOPRAZOLE SODIUM 20 MG/1
40 TABLET, DELAYED RELEASE ORAL
Qty: 0 | Refills: 0 | Status: DISCONTINUED | OUTPATIENT
Start: 2018-03-23 | End: 2018-03-26

## 2018-03-23 RX ORDER — LACOSAMIDE 50 MG/1
100 TABLET ORAL
Qty: 0 | Refills: 0 | Status: DISCONTINUED | OUTPATIENT
Start: 2018-03-23 | End: 2018-03-26

## 2018-03-23 RX ORDER — SODIUM CHLORIDE 9 MG/ML
1000 INJECTION INTRAMUSCULAR; INTRAVENOUS; SUBCUTANEOUS
Qty: 0 | Refills: 0 | Status: DISCONTINUED | OUTPATIENT
Start: 2018-03-23 | End: 2018-03-24

## 2018-03-23 RX ADMIN — SODIUM CHLORIDE 3 MILLILITER(S): 9 INJECTION INTRAMUSCULAR; INTRAVENOUS; SUBCUTANEOUS at 15:45

## 2018-03-23 RX ADMIN — Medication 2 MILLIGRAM(S): at 15:46

## 2018-03-23 RX ADMIN — SODIUM CHLORIDE 125 MILLILITER(S): 9 INJECTION INTRAMUSCULAR; INTRAVENOUS; SUBCUTANEOUS at 19:42

## 2018-03-23 RX ADMIN — Medication 0.25 MILLIGRAM(S): at 21:59

## 2018-03-23 RX ADMIN — DIVALPROEX SODIUM 1000 MILLIGRAM(S): 500 TABLET, DELAYED RELEASE ORAL at 19:01

## 2018-03-23 RX ADMIN — Medication 650 MILLIGRAM(S): at 20:54

## 2018-03-23 RX ADMIN — LACOSAMIDE 240 MILLIGRAM(S): 50 TABLET ORAL at 19:32

## 2018-03-23 NOTE — CHART NOTE - NSCHARTNOTEFT_GEN_A_CORE
Patient developed temperature of 101.2 F around 2021 H. CXR, Urinalysis, Urine Culture, Blood Cultures, Lactate and Procalcitonin ordered. Will hold antibiotics at this time. If spike temperature again then will start antibiotics for suspected aspiration pneumonia.

## 2018-03-23 NOTE — ED ADULT TRIAGE NOTE - CHIEF COMPLAINT QUOTE
BIBA, patient is awake and oriented to person only, s/p witnessed seizure by patients wife, patient has a history of seizures and a TBI takes Depakote daily for seizures

## 2018-03-23 NOTE — ED PROVIDER NOTE - OBJECTIVE STATEMENT
22 YO MALE WITH HX SEIZURES PRESENTS WITH MULTIPLE SEIZURES TODAY. FAMILY STATES HE HAS A HX SEIZURES AND IS ON DEPAKOTE 1000 MILLIGRAMS TWICE A DAY. PATIENT COMPLIANT ON MEDS. FOLLOWED BY DR RAMSAY. PCP GIBSON CAMPBELL. PT HAD 2 TONIC CLONIC SEIZURES TODAY, LASTING APPROXIMATELY 30 SECONDS.  PT HAD FIVE ABSENCE TYPE SEIZURES. PT HAS PROLONGED POST ICTAL PERIODS AND AS PER FAMILY IS NOTED TO GET VIOLENT AFTER SEIZURES. TODAY IN ER HE UNDRESSED AND PASSED URINE ON ANOTHER PATIENT.   MED HX Concussion    Seizures

## 2018-03-23 NOTE — CONSULT NOTE ADULT - SUBJECTIVE AND OBJECTIVE BOX
HPI:  22 yo h/o seizure ds started Jul7 2017, reporting 1-4 seizures per month, mixed GTC and complex partial seziures,  initially treated with keppra but later switched to depakote due to agrresive behavior, was admitted 7/2017 when head ct was normal, had few more visitis to the hospital due to recurrent seizures, was also see [facility] clinic Dr Sinha, did regular eeg in 01/18 reported normal, scgeduled for 24 hour eeg in april, mri still not done although was ordered through clinic,  today developed 2 grand mal and 3 complex partial seziures with bladder incontinence but no tongue bite.   has been give ativan 2 mg, now sleepy.   PAST MEDICAL & SURGICAL HISTORY:  Concussion  Seizures  No significant past surgical history      REVIEW OF SYSTEMS:    CMEDICATIONS  (STANDING):  Depakote 1000 mg bid.     MEDICATIONS  (PRN):      Allergies    No Known Allergies    Intolerances        SOCIAL HISTORY:  denies etoh, or drugs  FAMILY HISTORY:  Family history unknown: Patient is adapted.      PHYSICAL EXAM:  Vital Signs Last 24 Hrs  T(F): 97.5 (03-23-18 @ 12:57)  HR: 72 (03-23-18 @ 12:57)  BP: 150/94 (03-23-18 @ 12:57)  RR: 18 (03-23-18 @ 12:57)    GENERAL: NAD, well-groomed, well-developed  HEAD:  Atraumatic, Normocephalic  EYES: , PERRLA, conjunctiva and sclera clear  NECK: Supple,   NERVOUS SYSTEM:  Drowst arousable, but tends to drift in to sleep. , cranial nerves II-XII normal,   Good concentration; Motor Strength 5/5 B/L upper and lower extremities; DTRs 2+ intact and symmetric, plantar responses flexor bilaterally, s      LABS:                        16.5   10.8  )-----------( 178      ( 23 Mar 2018 15:36 )             49.3     03-23    142  |  101  |  15.0  ----------------------------<  95  4.8   |  28.0  |  1.26    Ca    9.7      23 Mar 2018 15:36            RADIOLOGY & ADDITIONAL STUDIES:

## 2018-03-23 NOTE — H&P ADULT - ASSESSMENT
Assessment: Given patient above info, patient is have breakthrough seizures    Plan:     Breakthrough Seizure: patient has been seen by Neurology, has been started on Depakote, Klonopin, Vimpat and Ativan PRN, will do seizure precautions, neuro checks, will monitor closely, CT head and MRI of the brain has been ordered, will do bed rest, will admit him in stepdown unit.      Insomnia: will give Ativan for sleep as needed.     DVT prophylaxis: Lovenox SC

## 2018-03-23 NOTE — ED ADULT NURSE NOTE - OBJECTIVE STATEMENT
pt biba s/p witnessed grand mal seizure activity x 2 by girlfriend, although pt has been compliant with taking his seiure meds currently having breakthrough seizure activity awaiting outpt mri study in April. + prolong post ictal state which make pt have very aggressive behavior. and aggitated. pt noted to get undressed and urinate on his roomate. unable to reorient while he's in the post itical state. denies any other compliants skin warm and ddry.

## 2018-03-23 NOTE — H&P ADULT - NSHPREVIEWOFSYSTEMS_GEN_ALL_CORE
CONSTITUTIONAL: No fever, has fatigue  RESPIRATORY: No cough, No shortness of breath  CARDIOVASCULAR: No chest pain, palpitations  GASTROINTESTINAL: No abdominal, No nausea, vomiting  NEUROLOGICAL: No headaches,  loss of strength, he has dizziness   MISCELLANEOUS: No joint swelling or pain

## 2018-03-23 NOTE — ED ADULT NURSE REASSESSMENT NOTE - NS ED NURSE REASSESS COMMENT FT1
drew buckner'ev at this time floor made aware of admission v/s taken and Dr. Wilson made aware of v/s patient having temp 101.2 and b/p 173/102  awaitng new orderes iv infusing well. alert snf st cooperative at present time.  Dr Wilson putting in new order and criselda for cxr and blood cultures and tylenol for temp

## 2018-03-23 NOTE — H&P ADULT - HISTORY OF PRESENT ILLNESS
24 y/o M Hx of seizure diagnosed at age of 3, has been following neurology, he has been complaint with Depakote, last month he had 3 grandmal Seizure on 1st, 7th and 17, after that he did not have any, today developed 2 grand mal and 3 complex partial seziures with bladder incontinence, witnessed by wife, he also has urinary incontinence twice, no tongue bite and was confused after the episodes, he was initially treated with keppra but later switched to depakote due to agrresive behavior, he was admitted 7/2017 when head ct was normal, had few more visitis to the hospital due to recurrent seizures, was also see [facility] Dr Sinha, did regular eeg in 01/18 reported normal, scheduled for 24 hour eeg in april, mri still not done although was ordered through clinic.  Most of the infomation obtained from the wife at the bed side  and neurology note.

## 2018-03-23 NOTE — ED ADULT NURSE REASSESSMENT NOTE - NS ED NURSE REASSESS COMMENT FT1
returned from ct scan, awaiting IV Vitmax to be mixed by pharmacist pt / family aware of delay, 4 frank notified of admission. awaiting transport. octavio dinner tray well.

## 2018-03-23 NOTE — ED PROVIDER NOTE - CONSTITUTIONAL, MLM
normal... ILL appearing, well nourished, awake, LETHARGIC  NOT oriented to person, place, time/situation and in no apparent distress.

## 2018-03-23 NOTE — H&P ADULT - NSHPPHYSICALEXAM_GEN_ALL_CORE
Vital Signs Last 24 Hrs  T(C): 36.4 (23 Mar 2018 12:57), Max: 36.4 (23 Mar 2018 12:57)  T(F): 97.5 (23 Mar 2018 12:57), Max: 97.5 (23 Mar 2018 12:57)  HR: 72 (23 Mar 2018 12:57) (72 - 72)  BP: 150/94 (23 Mar 2018 12:57) (150/94 - 150/94)  RR: 18 (23 Mar 2018 12:57) (18 - 18)  SpO2: 97% (23 Mar 2018 12:57) (97% - 97%)    PHYSICAL EXAM:    GENERAL: Elderly male looking drowsy and sleepy  HEENT: PERRL, +EOMI  NECK: soft, Supple, No JVD   CHEST/LUNG: Clear to auscultate bilaterally; No wheezing  HEART: S1S2+, Regular rate and rhythm; No murmurs  ABDOMEN: Soft, Nontender, Nondistended; Bowel sounds present  EXTREMITIES:  2+ Peripheral Pulses, No edema  SKIN: No rashes or lesions  NEURO: AAOX3, no focal deficits, no motor r sensory loss  PSYCH: normal mood

## 2018-03-23 NOTE — H&P ADULT - NSHPLABSRESULTS_GEN_ALL_CORE
16.5   10.8  )-----------( 178      ( 23 Mar 2018 15:36 )             49.3     03-23    142  |  101  |  15.0  ----------------------------<  95  4.8   |  28.0  |  1.26    Ca    9.7      23 Mar 2018 15:36      PT/INR - ( 23 Mar 2018 15:36 )   PT: 11.6 sec;   INR: 1.05 ratio         PTT - ( 23 Mar 2018 15:36 )  PTT:25.0 sec

## 2018-03-23 NOTE — CONSULT NOTE ADULT - ASSESSMENT
H/o seizure disorder presenting with breakthrough seizures, depakote level is good, will add vimpat to depakote, will get head ct, today and if possible try to get mri during admission. Due to bad insomina recomend small dose klonopin qhs.

## 2018-03-24 LAB
ALBUMIN SERPL ELPH-MCNC: 4 G/DL — SIGNIFICANT CHANGE UP (ref 3.3–5.2)
ALP SERPL-CCNC: 46 U/L — SIGNIFICANT CHANGE UP (ref 40–120)
ALT FLD-CCNC: 39 U/L — SIGNIFICANT CHANGE UP
ANION GAP SERPL CALC-SCNC: 17 MMOL/L — SIGNIFICANT CHANGE UP (ref 5–17)
APPEARANCE UR: CLEAR — SIGNIFICANT CHANGE UP
AST SERPL-CCNC: 32 U/L — SIGNIFICANT CHANGE UP
BILIRUB SERPL-MCNC: 0.9 MG/DL — SIGNIFICANT CHANGE UP (ref 0.4–2)
BILIRUB UR-MCNC: NEGATIVE — SIGNIFICANT CHANGE UP
BUN SERPL-MCNC: 14 MG/DL — SIGNIFICANT CHANGE UP (ref 8–20)
CALCIUM SERPL-MCNC: 8.7 MG/DL — SIGNIFICANT CHANGE UP (ref 8.6–10.2)
CHLORIDE SERPL-SCNC: 102 MMOL/L — SIGNIFICANT CHANGE UP (ref 98–107)
CK MB CFR SERPL CALC: 4.2 NG/ML — SIGNIFICANT CHANGE UP (ref 0–6.7)
CK SERPL-CCNC: 1245 U/L — HIGH (ref 30–200)
CO2 SERPL-SCNC: 23 MMOL/L — SIGNIFICANT CHANGE UP (ref 22–29)
COLOR SPEC: YELLOW — SIGNIFICANT CHANGE UP
CREAT SERPL-MCNC: 1.14 MG/DL — SIGNIFICANT CHANGE UP (ref 0.5–1.3)
DIFF PNL FLD: NEGATIVE — SIGNIFICANT CHANGE UP
GLUCOSE SERPL-MCNC: 83 MG/DL — SIGNIFICANT CHANGE UP (ref 70–115)
GLUCOSE UR QL: NEGATIVE MG/DL — SIGNIFICANT CHANGE UP
HCT VFR BLD CALC: 46.1 % — SIGNIFICANT CHANGE UP (ref 42–52)
HGB BLD-MCNC: 14.9 G/DL — SIGNIFICANT CHANGE UP (ref 14–18)
INR BLD: 1.09 RATIO — SIGNIFICANT CHANGE UP (ref 0.88–1.16)
KETONES UR-MCNC: ABNORMAL
LACTATE BLDV-MCNC: 1.5 MMOL/L — SIGNIFICANT CHANGE UP (ref 0.5–2)
LEUKOCYTE ESTERASE UR-ACNC: NEGATIVE — SIGNIFICANT CHANGE UP
MAGNESIUM SERPL-MCNC: 2.2 MG/DL — SIGNIFICANT CHANGE UP (ref 1.8–2.6)
MCHC RBC-ENTMCNC: 27.7 PG — SIGNIFICANT CHANGE UP (ref 27–31)
MCHC RBC-ENTMCNC: 32.3 G/DL — SIGNIFICANT CHANGE UP (ref 32–36)
MCV RBC AUTO: 85.8 FL — SIGNIFICANT CHANGE UP (ref 80–94)
NITRITE UR-MCNC: NEGATIVE — SIGNIFICANT CHANGE UP
PH UR: 6 — SIGNIFICANT CHANGE UP (ref 5–8)
PHOSPHATE SERPL-MCNC: 3.2 MG/DL — SIGNIFICANT CHANGE UP (ref 2.4–4.7)
PLATELET # BLD AUTO: 167 K/UL — SIGNIFICANT CHANGE UP (ref 150–400)
POTASSIUM SERPL-MCNC: 4 MMOL/L — SIGNIFICANT CHANGE UP (ref 3.5–5.3)
POTASSIUM SERPL-SCNC: 4 MMOL/L — SIGNIFICANT CHANGE UP (ref 3.5–5.3)
PROCALCITONIN SERPL-MCNC: <0.05 NG/ML — SIGNIFICANT CHANGE UP (ref 0–0.04)
PROT SERPL-MCNC: 7 G/DL — SIGNIFICANT CHANGE UP (ref 6.6–8.7)
PROT UR-MCNC: 30 MG/DL
PROTHROM AB SERPL-ACNC: 12 SEC — SIGNIFICANT CHANGE UP (ref 9.8–12.7)
RBC # BLD: 5.37 M/UL — SIGNIFICANT CHANGE UP (ref 4.6–6.2)
RBC # FLD: 13.3 % — SIGNIFICANT CHANGE UP (ref 11–15.6)
SODIUM SERPL-SCNC: 142 MMOL/L — SIGNIFICANT CHANGE UP (ref 135–145)
SP GR SPEC: 1.02 — SIGNIFICANT CHANGE UP (ref 1.01–1.02)
UROBILINOGEN FLD QL: 1 MG/DL
WBC # BLD: 8 K/UL — SIGNIFICANT CHANGE UP (ref 4.8–10.8)
WBC # FLD AUTO: 8 K/UL — SIGNIFICANT CHANGE UP (ref 4.8–10.8)
WBC UR QL: SIGNIFICANT CHANGE UP

## 2018-03-24 PROCEDURE — 99233 SBSQ HOSP IP/OBS HIGH 50: CPT

## 2018-03-24 RX ORDER — ENOXAPARIN SODIUM 100 MG/ML
40 INJECTION SUBCUTANEOUS DAILY
Qty: 0 | Refills: 0 | Status: DISCONTINUED | OUTPATIENT
Start: 2018-03-24 | End: 2018-03-26

## 2018-03-24 RX ADMIN — ENOXAPARIN SODIUM 40 MILLIGRAM(S): 100 INJECTION SUBCUTANEOUS at 22:07

## 2018-03-24 RX ADMIN — Medication 0.25 MILLIGRAM(S): at 22:07

## 2018-03-24 RX ADMIN — DIVALPROEX SODIUM 1000 MILLIGRAM(S): 500 TABLET, DELAYED RELEASE ORAL at 17:35

## 2018-03-24 RX ADMIN — LACOSAMIDE 100 MILLIGRAM(S): 50 TABLET ORAL at 17:35

## 2018-03-24 RX ADMIN — DIVALPROEX SODIUM 1000 MILLIGRAM(S): 500 TABLET, DELAYED RELEASE ORAL at 05:32

## 2018-03-24 RX ADMIN — LACOSAMIDE 100 MILLIGRAM(S): 50 TABLET ORAL at 05:32

## 2018-03-24 RX ADMIN — PANTOPRAZOLE SODIUM 40 MILLIGRAM(S): 20 TABLET, DELAYED RELEASE ORAL at 05:33

## 2018-03-24 NOTE — PROGRESS NOTE ADULT - SUBJECTIVE AND OBJECTIVE BOX
Interval History:  There was no recurrent seizure activity. Patient reported fatigue. No headache, nausea vomiting.    MEDICATIONS  (STANDING):  clonazePAM Tablet 0.25 milliGRAM(s) Oral at bedtime  diVALproex DR 1000 milliGRAM(s) Oral two times a day  influenza   Vaccine 0.5 milliLiter(s) IntraMuscular once  lacosamide 100 milliGRAM(s) Oral two times a day  pantoprazole    Tablet 40 milliGRAM(s) Oral before breakfast  sodium chloride 0.9%. 1000 milliLiter(s) (125 mL/Hr) IV Continuous <Continuous>    MEDICATIONS  (PRN):  acetaminophen   Tablet 650 milliGRAM(s) Oral every 6 hours PRN For Temp greater than 38 C (100.4 F)  LORazepam   Injectable 2 milliGRAM(s) IV Push every 8 hours PRN seizure lasting more than 3 minutes      Allergies    No Known Allergies    Intolerances        PHYSICAL EXAM:  Vital Signs Last 24 Hrs  T(F): 99.9 (18 @ 08:59)  HR: 76 (18 @ 08:59)  BP: 139/81 (18 @ 08:59)  RR: 18 (18 @ 08:59)    GENERAL: no acute distress  NERVOUS SYSTEM:    Alert & Oriented, speech and language normal.  Cranial nerve exam:  II-XII normal.  Motor exam: no pronator drift, strength symmetric in B/L upper and lower extremities  Sensory exam: no focal deficit  DTR; 1-2+ symmetric, plantar responses flexor bilaterally.  No dysmetria bilaterally    LABS:                        14.9   8.0   )-----------( 167      ( 24 Mar 2018 08:13 )             46.1         142  |  102  |  14.0  ----------------------------<  83  4.0   |  23.0  |  1.14    Ca    8.7      24 Mar 2018 08:13  Phos  3.2     -  Mg     2.2     -    TPro  7.0  /  Alb  4.0  /  TBili  0.9  /  DBili  x   /  AST  32  /  ALT  39  /  AlkPhos  46  -    PT/INR - ( 24 Mar 2018 08:13 )   PT: 12.0 sec;   INR: 1.09 ratio         PTT - ( 23 Mar 2018 15:36 )  PTT:25.0 sec  Urinalysis Basic - ( 24 Mar 2018 05:56 )    Color: Yellow / Appearance: Clear / S.020 / pH: x  Gluc: x / Ketone: Small  / Bili: Negative / Urobili: 1 mg/dL   Blood: x / Protein: 30 mg/dL / Nitrite: Negative   Leuk Esterase: Negative / RBC: x / WBC 3-5   Sq Epi: x / Non Sq Epi: x / Bacteria: x        Diagnostic STUDIES:  Head CT reported < from: CT Head No Cont (18 @ 19:05) >  IMPRESSION:    No acute intracranial findings.    < end of copied text >  	    IMPRESSION:    Seizure disorder, recurrent seizure    PLAN:  - continue Vimpat 100mg BID   - continue Depakote 1000mg BID  - seizure precaution  - Ativan PRN  - neuro stable

## 2018-03-24 NOTE — PROGRESS NOTE ADULT - ASSESSMENT
Assessment: Given patient above info, patient is have breakthrough seizures    Plan:     Breakthrough Seizure: patient has been seen by Neurology, on Depakote, Klonopin, Vimpat and Ativan PRN, no more seizures, will continue with seizure precautions, neuro checks, CT head done and is unremarkable and MRI of the brain is pending, was admit in stepdown unit, will down grade.       Nontraumatic Rhabdomyolysis: IV fluids, will monitor CPK levels     Insomnia: will give Ativan for sleep as needed, Melatonin PRN    DVT prophylaxis: Lovenox SC

## 2018-03-24 NOTE — PROGRESS NOTE ADULT - SUBJECTIVE AND OBJECTIVE BOX
JETHRO SPANN    [id number]    23y      Male    Patient is a 23y old  Male who presents with a chief complaint of Seizure couple of times today (23 Mar 2018 18:57)      INTERVAL HPI/OVERNIGHT EVENTS:    patient has no seizure over night, he feels some improvement, he spiked fever, last night, CXR and UA unremarkable has no more fever    REVIEW OF SYSTEMS:    CONSTITUTIONAL: No fever, has fatigue  RESPIRATORY: No cough, No shortness of breath  CARDIOVASCULAR: No chest pain, palpitations  GASTROINTESTINAL: No abdominal, No nausea, vomiting  NEUROLOGICAL: No headaches,  loss of strength, he has dizziness   MISCELLANEOUS: No joint swelling or pain      Vital Signs Last 24 Hrs  T(C): 37.7 (24 Mar 2018 08:59), Max: 38.4 (23 Mar 2018 20:21)  T(F): 99.9 (24 Mar 2018 08:59), Max: 101.2 (23 Mar 2018 20:21)  HR: 76 (24 Mar 2018 08:59) (60 - 92)  BP: 139/81 (24 Mar 2018 08:59) (138/72 - 173/102)  BP(mean): 98 (24 Mar 2018 05:28) (98 - 107)  RR: 18 (24 Mar 2018 08:59) (16 - 19)  SpO2: 99% (24 Mar 2018 08:59) (97% - 100%)    PHYSICAL EXAM:    GENERAL: Elderly male looking drowsy and sleepy  HEENT: PERRL, +EOMI  NECK: soft, Supple, No JVD   CHEST/LUNG: Clear to auscultate bilaterally; No wheezing  HEART: S1S2+, Regular rate and rhythm; No murmurs  ABDOMEN: Soft, Nontender, Nondistended; Bowel sounds present  EXTREMITIES:  2+ Peripheral Pulses, No edema  SKIN: No rashes or lesions  NEURO: AAOX3, no focal deficits, no motor r sensory loss  PSYCH: normal mood      LABS:                        14.9   8.0   )-----------( 167      ( 24 Mar 2018 08:13 )             46.1     03-24    142  |  102  |  14.0  ----------------------------<  83  4.0   |  23.0  |  1.14    Ca    8.7      24 Mar 2018 08:13  Phos  3.2     03-24  Mg     2.2     03-24    TPro  7.0  /  Alb  4.0  /  TBili  0.9  /  DBili  x   /  AST  32  /  ALT  39  /  AlkPhos  46  -    PT/INR - ( 24 Mar 2018 08:13 )   PT: 12.0 sec;   INR: 1.09 ratio         PTT - ( 23 Mar 2018 15:36 )  PTT:25.0 sec  Urinalysis Basic - ( 24 Mar 2018 05:56 )    Color: Yellow / Appearance: Clear / S.020 / pH: x  Gluc: x / Ketone: Small  / Bili: Negative / Urobili: 1 mg/dL   Blood: x / Protein: 30 mg/dL / Nitrite: Negative   Leuk Esterase: Negative / RBC: x / WBC 3-5   Sq Epi: x / Non Sq Epi: x / Bacteria: x          I&O's Summary    23 Mar 2018 07:01  -  24 Mar 2018 07:00  --------------------------------------------------------  IN: 1475 mL / OUT: 450 mL / NET: 1025 mL        MEDICATIONS  (STANDING):  clonazePAM Tablet 0.25 milliGRAM(s) Oral at bedtime  diVALproex DR 1000 milliGRAM(s) Oral two times a day  influenza   Vaccine 0.5 milliLiter(s) IntraMuscular once  lacosamide 100 milliGRAM(s) Oral two times a day  pantoprazole    Tablet 40 milliGRAM(s) Oral before breakfast  sodium chloride 0.9%. 1000 milliLiter(s) (125 mL/Hr) IV Continuous <Continuous>    MEDICATIONS  (PRN):  acetaminophen   Tablet 650 milliGRAM(s) Oral every 6 hours PRN For Temp greater than 38 C (100.4 F)  LORazepam   Injectable 2 milliGRAM(s) IV Push every 8 hours PRN seizure lasting more than 3 minutes

## 2018-03-25 LAB
AMPHET UR-MCNC: NEGATIVE — SIGNIFICANT CHANGE UP
ANION GAP SERPL CALC-SCNC: 13 MMOL/L — SIGNIFICANT CHANGE UP (ref 5–17)
BARBITURATES UR SCN-MCNC: NEGATIVE — SIGNIFICANT CHANGE UP
BENZODIAZ UR-MCNC: NEGATIVE — SIGNIFICANT CHANGE UP
BUN SERPL-MCNC: 17 MG/DL — SIGNIFICANT CHANGE UP (ref 8–20)
CALCIUM SERPL-MCNC: 9.2 MG/DL — SIGNIFICANT CHANGE UP (ref 8.6–10.2)
CHLORIDE SERPL-SCNC: 103 MMOL/L — SIGNIFICANT CHANGE UP (ref 98–107)
CK MB CFR SERPL CALC: 4.4 NG/ML — SIGNIFICANT CHANGE UP (ref 0–6.7)
CK SERPL-CCNC: 1041 U/L — HIGH (ref 30–200)
CO2 SERPL-SCNC: 27 MMOL/L — SIGNIFICANT CHANGE UP (ref 22–29)
COCAINE METAB.OTHER UR-MCNC: NEGATIVE — SIGNIFICANT CHANGE UP
CREAT SERPL-MCNC: 1.06 MG/DL — SIGNIFICANT CHANGE UP (ref 0.5–1.3)
CULTURE RESULTS: NO GROWTH — SIGNIFICANT CHANGE UP
GLUCOSE SERPL-MCNC: 90 MG/DL — SIGNIFICANT CHANGE UP (ref 70–115)
METHADONE UR-MCNC: NEGATIVE — SIGNIFICANT CHANGE UP
OPIATES UR-MCNC: NEGATIVE — SIGNIFICANT CHANGE UP
PCP SPEC-MCNC: SIGNIFICANT CHANGE UP
PCP UR-MCNC: NEGATIVE — SIGNIFICANT CHANGE UP
POTASSIUM SERPL-MCNC: 4.1 MMOL/L — SIGNIFICANT CHANGE UP (ref 3.5–5.3)
POTASSIUM SERPL-SCNC: 4.1 MMOL/L — SIGNIFICANT CHANGE UP (ref 3.5–5.3)
SODIUM SERPL-SCNC: 143 MMOL/L — SIGNIFICANT CHANGE UP (ref 135–145)
SPECIMEN SOURCE: SIGNIFICANT CHANGE UP
THC UR QL: POSITIVE

## 2018-03-25 PROCEDURE — 70553 MRI BRAIN STEM W/O & W/DYE: CPT | Mod: 26

## 2018-03-25 PROCEDURE — 99232 SBSQ HOSP IP/OBS MODERATE 35: CPT

## 2018-03-25 RX ADMIN — Medication 0.25 MILLIGRAM(S): at 21:11

## 2018-03-25 RX ADMIN — DIVALPROEX SODIUM 1000 MILLIGRAM(S): 500 TABLET, DELAYED RELEASE ORAL at 06:10

## 2018-03-25 RX ADMIN — PANTOPRAZOLE SODIUM 40 MILLIGRAM(S): 20 TABLET, DELAYED RELEASE ORAL at 06:10

## 2018-03-25 RX ADMIN — DIVALPROEX SODIUM 1000 MILLIGRAM(S): 500 TABLET, DELAYED RELEASE ORAL at 17:25

## 2018-03-25 RX ADMIN — LACOSAMIDE 100 MILLIGRAM(S): 50 TABLET ORAL at 17:25

## 2018-03-25 RX ADMIN — LACOSAMIDE 100 MILLIGRAM(S): 50 TABLET ORAL at 06:10

## 2018-03-25 NOTE — PROGRESS NOTE ADULT - SUBJECTIVE AND OBJECTIVE BOX
JETHRO SPANN    [id number]    23y      Male    Patient is a 23y old  Male who presents with a chief complaint of Seizure couple of times today (23 Mar 2018 18:57)      INTERVAL HPI/OVERNIGHT EVENTS:    Patient has no seizure over night, he feels some improvement,  no more fever spikes, he feels some dizziness with walking.     REVIEW OF SYSTEMS:    CONSTITUTIONAL: No fever, has fatigue  RESPIRATORY: No cough, No shortness of breath  CARDIOVASCULAR: No chest pain, palpitations  GASTROINTESTINAL: No abdominal, No nausea, vomiting  NEUROLOGICAL: No headaches,  loss of strength, he has dizziness   MISCELLANEOUS: No joint swelling or pain      Vital Signs Last 24 Hrs  T(C): 36.8 (25 Mar 2018 00:52), Max: 37.2 (24 Mar 2018 20:00)  T(F): 98.2 (25 Mar 2018 00:52), Max: 99 (24 Mar 2018 20:00)  HR: 48 (25 Mar 2018 07:51) (48 - 80)  BP: 144/74 (25 Mar 2018 00:52) (137/76 - 158/93)  BP(mean): 92 (24 Mar 2018 22:05) (92 - 92)  RR: 19 (25 Mar 2018 00:52) (16 - 22)  SpO2: 96% (25 Mar 2018 00:52) (96% - 99%)    PHYSICAL EXAM:    GENERAL: Elderly male looking comfortable  HEENT: PERRL, +EOMI  NECK: soft, Supple, No JVD   CHEST/LUNG: Clear to auscultate bilaterally; No wheezing  HEART: S1S2+, Regular rate and rhythm; No murmurs  ABDOMEN: Soft, Nontender, Nondistended; Bowel sounds present  EXTREMITIES:  2+ Peripheral Pulses, No edema  SKIN: No rashes or lesions  NEURO: AAOX3, no focal deficits, no motor r sensory loss  PSYCH: normal mood      LABS:                        14.9   8.0   )-----------( 167      ( 24 Mar 2018 08:13 )             46.1     -    143  |  103  |  17.0  ----------------------------<  90  4.1   |  27.0  |  1.06    Ca    9.2      25 Mar 2018 06:54  Phos  3.2     03-24  Mg     2.2     03-24    TPro  7.0  /  Alb  4.0  /  TBili  0.9  /  DBili  x   /  AST  32  /  ALT  39  /  AlkPhos  46  03-24    PT/INR - ( 24 Mar 2018 08:13 )   PT: 12.0 sec;   INR: 1.09 ratio         PTT - ( 23 Mar 2018 15:36 )  PTT:25.0 sec  Urinalysis Basic - ( 24 Mar 2018 05:56 )    Color: Yellow / Appearance: Clear / S.020 / pH: x  Gluc: x / Ketone: Small  / Bili: Negative / Urobili: 1 mg/dL   Blood: x / Protein: 30 mg/dL / Nitrite: Negative   Leuk Esterase: Negative / RBC: x / WBC 3-5   Sq Epi: x / Non Sq Epi: x / Bacteria: x          I&O's Summary    24 Mar 2018 07:01  -  25 Mar 2018 07:00  --------------------------------------------------------  IN: 480 mL / OUT: 1050 mL / NET: -570 mL        MEDICATIONS  (STANDING):  clonazePAM Tablet 0.25 milliGRAM(s) Oral at bedtime  diVALproex DR 1000 milliGRAM(s) Oral two times a day  enoxaparin Injectable 40 milliGRAM(s) SubCutaneous daily  influenza   Vaccine 0.5 milliLiter(s) IntraMuscular once  lacosamide 100 milliGRAM(s) Oral two times a day  pantoprazole    Tablet 40 milliGRAM(s) Oral before breakfast    MEDICATIONS  (PRN):  acetaminophen   Tablet 650 milliGRAM(s) Oral every 6 hours PRN For Temp greater than 38 C (100.4 F)  LORazepam   Injectable 2 milliGRAM(s) IV Push every 8 hours PRN seizure lasting more than 3 minutes

## 2018-03-25 NOTE — PROGRESS NOTE ADULT - ASSESSMENT
Assessment: Given patient above info, patient is have breakthrough seizures, he was admitted under medicine in stepdown unit, has been seen by Neurology, on Depakote, Klonopin, Vimpat and Ativan PRN, no more seizures, will continue with seizure precautions, neuro checks, CT head done and is unremarkable and MRI of the brain is pending, was admit in stepdown unit, then down graded, he was also found to have Nontraumatic Rhabdomyolysis, has been getting IV fluids,CPK levels coming down.   He has Hx of insomnia, he has been given Melatonin PRN.     Plan:     Breakthrough Seizure: patient has been seen by Neurology, on Depakote, Klonopin, Vimpat and Ativan PRN, no more seizures, will continue with seizure precautions, neuro checks, CT head done and is unremarkable and MRI of the brain is pending, was admit in stepdown unit, then down graded.       Nontraumatic Rhabdomyolysis: IV fluids, will monitor CPK levels, coming down.     Insomnia: will give Ativan for sleep as needed, Melatonin PRN    DVT prophylaxis: Lovenox SC        Will get PT consult as patient has been feeling dizziness with walking.

## 2018-03-26 ENCOUNTER — INPATIENT (INPATIENT)
Facility: HOSPITAL | Age: 23
LOS: 3 days | Discharge: HOME | DRG: 101 | End: 2018-03-30
Attending: HOSPITALIST | Admitting: HOSPITALIST
Payer: COMMERCIAL

## 2018-03-26 ENCOUNTER — TRANSCRIPTION ENCOUNTER (OUTPATIENT)
Age: 23
End: 2018-03-26

## 2018-03-26 VITALS — HEIGHT: 72 IN | WEIGHT: 210.1 LBS

## 2018-03-26 VITALS
DIASTOLIC BLOOD PRESSURE: 80 MMHG | TEMPERATURE: 98 F | HEART RATE: 69 BPM | OXYGEN SATURATION: 99 % | SYSTOLIC BLOOD PRESSURE: 135 MMHG | RESPIRATION RATE: 18 BRPM

## 2018-03-26 LAB
ALBUMIN SERPL ELPH-MCNC: 4.7 G/DL — SIGNIFICANT CHANGE UP (ref 3.3–5.2)
ALP SERPL-CCNC: 51 U/L — SIGNIFICANT CHANGE UP (ref 40–120)
ALT FLD-CCNC: 47 U/L — HIGH
ANION GAP SERPL CALC-SCNC: 13 MMOL/L — SIGNIFICANT CHANGE UP (ref 5–17)
APAP SERPL-MCNC: <7.5 UG/ML — LOW (ref 10–26)
AST SERPL-CCNC: 41 U/L — HIGH
BILIRUB SERPL-MCNC: 0.8 MG/DL — SIGNIFICANT CHANGE UP (ref 0.4–2)
BUN SERPL-MCNC: 14 MG/DL — SIGNIFICANT CHANGE UP (ref 8–20)
CALCIUM SERPL-MCNC: 10 MG/DL — SIGNIFICANT CHANGE UP (ref 8.6–10.2)
CHLORIDE SERPL-SCNC: 99 MMOL/L — SIGNIFICANT CHANGE UP (ref 98–107)
CK MB CFR SERPL CALC: 3.2 NG/ML — SIGNIFICANT CHANGE UP (ref 0–6.7)
CK MB CFR SERPL CALC: 4 NG/ML — SIGNIFICANT CHANGE UP (ref 0–6.7)
CK SERPL-CCNC: 545 U/L — HIGH (ref 30–200)
CK SERPL-CCNC: 635 U/L — HIGH (ref 30–200)
CO2 SERPL-SCNC: 27 MMOL/L — SIGNIFICANT CHANGE UP (ref 22–29)
CREAT SERPL-MCNC: 0.95 MG/DL — SIGNIFICANT CHANGE UP (ref 0.5–1.3)
EOSINOPHIL # BLD AUTO: 0 K/UL — SIGNIFICANT CHANGE UP (ref 0–0.5)
EOSINOPHIL NFR BLD AUTO: 0.4 % — SIGNIFICANT CHANGE UP (ref 0–5)
ETHANOL SERPL-MCNC: <10 MG/DL — SIGNIFICANT CHANGE UP
GLUCOSE SERPL-MCNC: 89 MG/DL — SIGNIFICANT CHANGE UP (ref 70–115)
HCT VFR BLD CALC: 47.3 % — SIGNIFICANT CHANGE UP (ref 42–52)
HGB BLD-MCNC: 16 G/DL — SIGNIFICANT CHANGE UP (ref 14–18)
LYMPHOCYTES # BLD AUTO: 1.8 K/UL — SIGNIFICANT CHANGE UP (ref 1–4.8)
LYMPHOCYTES # BLD AUTO: 32.7 % — SIGNIFICANT CHANGE UP (ref 20–55)
MCHC RBC-ENTMCNC: 28.8 PG — SIGNIFICANT CHANGE UP (ref 27–31)
MCHC RBC-ENTMCNC: 33.8 G/DL — SIGNIFICANT CHANGE UP (ref 32–36)
MCV RBC AUTO: 85.1 FL — SIGNIFICANT CHANGE UP (ref 80–94)
MONOCYTES # BLD AUTO: 0.5 K/UL — SIGNIFICANT CHANGE UP (ref 0–0.8)
MONOCYTES NFR BLD AUTO: 9.8 % — SIGNIFICANT CHANGE UP (ref 3–10)
NEUTROPHILS # BLD AUTO: 3.1 K/UL — SIGNIFICANT CHANGE UP (ref 1.8–8)
NEUTROPHILS NFR BLD AUTO: 56.9 % — SIGNIFICANT CHANGE UP (ref 37–73)
PLATELET # BLD AUTO: 165 K/UL — SIGNIFICANT CHANGE UP (ref 150–400)
POTASSIUM SERPL-MCNC: 4 MMOL/L — SIGNIFICANT CHANGE UP (ref 3.5–5.3)
POTASSIUM SERPL-SCNC: 4 MMOL/L — SIGNIFICANT CHANGE UP (ref 3.5–5.3)
PROT SERPL-MCNC: 7.9 G/DL — SIGNIFICANT CHANGE UP (ref 6.6–8.7)
RBC # BLD: 5.56 M/UL — SIGNIFICANT CHANGE UP (ref 4.6–6.2)
RBC # FLD: 12.6 % — SIGNIFICANT CHANGE UP (ref 11–15.6)
SALICYLATES SERPL-MCNC: <0.6 MG/DL — LOW (ref 10–20)
SODIUM SERPL-SCNC: 139 MMOL/L — SIGNIFICANT CHANGE UP (ref 135–145)
VALPROATE SERPL-MCNC: 78.1 UG/ML — SIGNIFICANT CHANGE UP (ref 50–100)
WBC # BLD: 5.4 K/UL — SIGNIFICANT CHANGE UP (ref 4.8–10.8)
WBC # FLD AUTO: 5.4 K/UL — SIGNIFICANT CHANGE UP (ref 4.8–10.8)

## 2018-03-26 PROCEDURE — 99285 EMERGENCY DEPT VISIT HI MDM: CPT

## 2018-03-26 PROCEDURE — 99239 HOSP IP/OBS DSCHRG MGMT >30: CPT

## 2018-03-26 RX ORDER — LACOSAMIDE 50 MG/1
1 TABLET ORAL
Qty: 60 | Refills: 0 | OUTPATIENT
Start: 2018-03-26

## 2018-03-26 RX ORDER — CLONAZEPAM 1 MG
0.25 TABLET ORAL
Qty: 0 | Refills: 0 | COMMUNITY
Start: 2018-03-26

## 2018-03-26 RX ADMIN — DIVALPROEX SODIUM 1000 MILLIGRAM(S): 500 TABLET, DELAYED RELEASE ORAL at 05:38

## 2018-03-26 RX ADMIN — PANTOPRAZOLE SODIUM 40 MILLIGRAM(S): 20 TABLET, DELAYED RELEASE ORAL at 05:38

## 2018-03-26 RX ADMIN — ENOXAPARIN SODIUM 40 MILLIGRAM(S): 100 INJECTION SUBCUTANEOUS at 11:10

## 2018-03-26 RX ADMIN — LACOSAMIDE 100 MILLIGRAM(S): 50 TABLET ORAL at 05:38

## 2018-03-26 NOTE — PROGRESS NOTE ADULT - SUBJECTIVE AND OBJECTIVE BOX
Patient: JETHRO SPANN [id number] 23y Male                 Internal Medicine Hospitalist Progress Note - Dr. Eric Beaulieu  Chief Complaint: Patient is a 23y old  Male who presents with a chief complaint of Seizure couple of times today (23 Mar 2018 18:57)    HPI:  22 y/o M Hx of seizure diagnosed at age of 3, has been following neurology, compliant with medication, admitted with recurrent seizures.  Seen by neurology, started on Vimpat.  No recurrent seizures noted.  MRI reviewed by Neurology, felt to be stable for outpatient followup.  Pt denies complaints.  In agreement.     ____________________PHYSICAL EXAM:  Vitals reviewed as indicated below  GENERAL:  NAD Alert and Oriented x 3   HEENT: NCAT  CARDIOVASCULAR:  S1, S2  LUNGS: CTAB  ABDOMEN:  soft, (-) tenderness, (-) distension, (+) bowel sounds, (-) guarding, (-) rebound (-) rigidity  EXTREMITIES:  no cyanosis / clubbing / edema.   ____________________      BACKGROUND:  HEALTH ISSUES - PROBLEM Dx:        Allergies    No Known Allergies    Intolerances      PAST MEDICAL & SURGICAL HISTORY:  Concussion  Seizures  No significant past surgical history        VITALS:  Vital Signs Last 24 Hrs  T(C): 36.7 (26 Mar 2018 08:27), Max: 36.7 (25 Mar 2018 14:48)  T(F): 98.1 (26 Mar 2018 08:27), Max: 98.1 (25 Mar 2018 14:48)  HR: 73 (26 Mar 2018 08:27) (56 - 73)  BP: 155/107 (26 Mar 2018 08:27) (124/74 - 155/107)  BP(mean): --  RR: 18 (26 Mar 2018 08:27) (18 - 18)  SpO2: 98% (26 Mar 2018 08:27) (98% - 99%) Daily     Daily   CAPILLARY BLOOD GLUCOSE        I&O's Summary    25 Mar 2018 07:01  -  26 Mar 2018 07:00  --------------------------------------------------------  IN: 0 mL / OUT: 1600 mL / NET: -1600 mL    26 Mar 2018 07:01  -  26 Mar 2018 12:06  --------------------------------------------------------  IN: 360 mL / OUT: 650 mL / NET: -290 mL          LABS:    03-25    143  |  103  |  17.0  ----------------------------<  90  4.1   |  27.0  |  1.06    Ca    9.2      25 Mar 2018 06:54            CARDIAC MARKERS ( 26 Mar 2018 06:28 )  x     / x     / 635 U/L / x     / 3.2 ng/mL  CARDIAC MARKERS ( 25 Mar 2018 06:54 )  x     / x     / 1041 U/L / x     / 4.4 ng/mL          MEDICATIONS:  MEDICATIONS  (STANDING):  clonazePAM Tablet 0.25 milliGRAM(s) Oral at bedtime  diVALproex DR 1000 milliGRAM(s) Oral two times a day  enoxaparin Injectable 40 milliGRAM(s) SubCutaneous daily  lacosamide 100 milliGRAM(s) Oral two times a day  pantoprazole    Tablet 40 milliGRAM(s) Oral before breakfast    MEDICATIONS  (PRN):  acetaminophen   Tablet 650 milliGRAM(s) Oral every 6 hours PRN For Temp greater than 38 C (100.4 F)  LORazepam   Injectable 2 milliGRAM(s) IV Push every 8 hours PRN seizure lasting more than 3 minutes

## 2018-03-26 NOTE — PROGRESS NOTE ADULT - SUBJECTIVE AND OBJECTIVE BOX
INTERVAL HISTORY:  no further seizures  EEG- 12/12/18 -was normal      VITAL SIGNS:  Vital Signs Last 24 Hrs  T(C): 36.7 (26 Mar 2018 08:27), Max: 36.7 (25 Mar 2018 14:48)  T(F): 98.1 (26 Mar 2018 08:27), Max: 98.1 (25 Mar 2018 14:48)  HR: 73 (26 Mar 2018 08:27) (56 - 73)  BP: 155/107 (26 Mar 2018 08:27) (124/74 - 155/107)  BP(mean): --  RR: 18 (26 Mar 2018 08:27) (18 - 18)  SpO2: 98% (26 Mar 2018 08:27) (98% - 99%)    PHYSICAL EXAMINATION:    Mentation:  nl  Language/Speech:nl  CN: nl  Visual Fields: full  Motor: nl  Sensory:  DTR:  Babinski:      MEDS:  MEDICATIONS  (STANDING):  clonazePAM Tablet 0.25 milliGRAM(s) Oral at bedtime  diVALproex DR 1000 milliGRAM(s) Oral two times a day  enoxaparin Injectable 40 milliGRAM(s) SubCutaneous daily  influenza   Vaccine 0.5 milliLiter(s) IntraMuscular once  lacosamide 100 milliGRAM(s) Oral two times a day  pantoprazole    Tablet 40 milliGRAM(s) Oral before breakfast    MEDICATIONS  (PRN):  acetaminophen   Tablet 650 milliGRAM(s) Oral every 6 hours PRN For Temp greater than 38 C (100.4 F)  LORazepam   Injectable 2 milliGRAM(s) IV Push every 8 hours PRN seizure lasting more than 3 minutes      LABS:      03-25    143  |  103  |  17.0  ----------------------------<  90  4.1   |  27.0  |  1.06    Ca    9.2      25 Mar 2018 06:54            RADIOLOGY & ADDITIONAL STUDIES:      < from: MR Head w/wo IV Cont (03.25.18 @ 12:18) >  Very mild volume loss of the right hippocampus with minimal subtle   abnormal signal may be seen in mesial temporal sclerosis    < end of copied text >    IMPRESSION & PLAN:    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 Sinha- (epilepsy doctor) in our office. He may be a candidate for epilepsy surgery.  Will not actively follow.   Neurologically cleared for discharge/disposition.  Please recontact as needed.  Follow up in office in 2-4 weeks as instructed.

## 2018-03-26 NOTE — DISCHARGE NOTE ADULT - PATIENT PORTAL LINK FT
You can access the [facility] Patient Portal, offered by [facility], by registering with the following website: [email or web address]

## 2018-03-26 NOTE — ED PROVIDER NOTE - MEDICAL DECISION MAKING DETAILS
PT here for recurrent seizure s/p discharge home from hospital today;  plan to repeat blood work, hydrate and readmit to medical service.

## 2018-03-26 NOTE — ED ADULT NURSE NOTE - OBJECTIVE STATEMENT
pt a+ox3, presents to ED s/p witnessed seizure @ home. responses are delayed but following commands. per family @ bedside, pt was on his way home after d/c from hospital for admission and had 30 sec seizure in car. pt denies any pain or discomfort, states he feels tired and is unable to fall asleep since starting meds x 1 year. pt in no apparent distress, MD @ bedside, will continue to monitor.

## 2018-03-26 NOTE — DISCHARGE NOTE ADULT - HOSPITAL COURSE
Patient: JETHRO SPANN [id number]                 Internal Medicine Hospitalist Discharge Note - Dr. Eric Beaulieu    Chief Complaint: Patient is a 23y old  Male who presents with a chief complaint of Seizure couple of times today (23 Mar 2018 18:57)    HPI:  24 y/o M Hx of seizure diagnosed at age of 3, has been following neurology, compliant with medication, admitted with recurrent seizures.  Seen by neurology, started on Vimpat.  No recurrent seizures noted.  MRI reviewed by Neurology, felt to be stable for outpatient followup.  Pt denies complaints.  In agreement.       > Breakthrough Seizure: patient has been seen by Neurology, on Depakote, Vimpat and Ativan PRN.  Outpatient f/u discussed.  Pt states he is always accompanied by wife at home.  Does not drive / operate heavy machinery / etc.   > Nontraumatic Rhabdomyolysis:  CPK significantly improved.  Continue hydration as outpatient.  Attributed to seizure.  > Insomnia - counselled on proper sleep hygiene.  Melatonin as outpatient with f/u wtih PMD.  Stable for discharge.      Disposition: stable for discharge.  Outpatient followup as above.  Patient was advised to return if they experience any recurrence or worsening of symptoms.  Total time spent on discharge was 35 minutes.  -Eric Beaulieu D.O., Hospitalist, [facility]

## 2018-03-26 NOTE — PHYSICAL THERAPY INITIAL EVALUATION ADULT - CRITERIA FOR SKILLED THERAPEUTIC INTERVENTIONS
impairments found/risk reduction/prevention/functional limitations in following categories/rehab potential/therapy frequency/predicted duration of therapy intervention/anticipated equipment needs at discharge/anticipated discharge recommendation

## 2018-03-26 NOTE — DISCHARGE NOTE ADULT - MEDICATION SUMMARY - MEDICATIONS TO STOP TAKING
I will STOP taking the medications listed below when I get home from the hospital:    Mr Garay Mich is under my care here at [facility], I have been meeting Kamala Romeo here every day with him.

## 2018-03-26 NOTE — ED PROVIDER NOTE - PROGRESS NOTE DETAILS
at [facility] neuro recs from admission implemented no signs acute trauma pt with another tonic clonci seizure lasted 5 minutes subsided with ativan unsafe dispo back to shelter multiple seizure since last discharge weill admit for further workup

## 2018-03-26 NOTE — ED PROVIDER NOTE - OBJECTIVE STATEMENT
Pt is a 23yoM with seizure d/o on depakote and recently vimpat, s/p hospital discharge today; Pt brought in after he had another seizure on his way home from hospital;  per cousin, he started staring at her "funny" and then had a subsequent seizure;  pt now confused per normal postical periods;  mother at bedside.

## 2018-03-26 NOTE — PROGRESS NOTE ADULT - ASSESSMENT
24 y/o M Hx of seizure diagnosed at age of 3, has been following neurology, compliant with medication, admitted with recurrent seizures.  Seen by neurology, started on Vimpat.  No recurrent seizures noted.  MRI reviewed by Neurology, felt to be stable for outpatient followup.  Pt denies complaints.  In agreement.       > Breakthrough Seizure: patient has been seen by Neurology, on Depakote, Vimpat and Ativan PRN.  Outpatient f/u discussed.  Pt states he is always accompanied by wife at home.  Does not drive / operate heavy machinery / etc.   > Nontraumatic Rhabdomyolysis:  CPK significantly improved.  Continue hydration as outpatient.  Attributed to seizure.  > Insomnia - counselled on proper sleep hygiene.  Melatonin as outpatient with f/u wtih PMD.  Stable for discharge.

## 2018-03-26 NOTE — DISCHARGE NOTE ADULT - PLAN OF CARE
stable for discharge It is important to see your primary physician as well as the physicians noted below within the next week to perform a comprehensive medical review.  Call their offices for an appointment as soon as you leave the hospital.  If you do not have a primary physician, contact the [facility] [place] [phone] located on [address] [place] [address].  Your medical issues appear to be stable at this time, but if your symptoms recur or worsen, contact your physicians and/or return to the hospital if necessary.  If you encounter any issues or questions with your medication, call your physicians before stopping the medication.  Do not drive.

## 2018-03-26 NOTE — DISCHARGE NOTE ADULT - CARE PLAN
Principal Discharge DX:	Seizures  Goal:	stable for discharge  Assessment and plan of treatment:	It is important to see your primary physician as well as the physicians noted below within the next week to perform a comprehensive medical review.  Call their offices for an appointment as soon as you leave the hospital.  If you do not have a primary physician, contact the [facility] [place] [phone] located on [address] [place] [address], [place] [address].  Your medical issues appear to be stable at this time, but if your symptoms recur or worsen, contact your physicians and/or return to the hospital if necessary.  If you encounter any issues or questions with your medication, call your physicians before stopping the medication.  Do not drive.  Secondary Diagnosis:	Non-traumatic rhabdomyolysis

## 2018-03-26 NOTE — PHYSICAL THERAPY INITIAL EVALUATION ADULT - ADDITIONAL COMMENTS
as per pt.: leaves in the private house, no steps to enter, (-) DME, PTA independent with all ADL's.

## 2018-03-26 NOTE — DISCHARGE NOTE ADULT - MEDICATION SUMMARY - MEDICATIONS TO TAKE
I will START or STAY ON the medications listed below when I get home from the hospital:    divalproex sodium 500 mg oral delayed release tablet  -- 2 tab(s) by mouth 2 times a day  -- Indication: For Seizures    clonazePAM  -- 0.25 milligram(s) by mouth once a day (at bedtime)  -- Indication: For Insomnia    lacosamide 100 mg oral tablet  -- 1 tab(s) by mouth 2 times a day MDD:2  -- Indication: For Seizures

## 2018-03-26 NOTE — DISCHARGE NOTE ADULT - CARE PROVIDER_API CALL
Joaquin Veras), Neurology  [address] [place] [address]  Phone: [phone]  Fax: [phone]

## 2018-03-27 DIAGNOSIS — R74.8 ABNORMAL LEVELS OF OTHER SERUM ENZYMES: ICD-10-CM

## 2018-03-27 DIAGNOSIS — F12.10 CANNABIS ABUSE, UNCOMPLICATED: ICD-10-CM

## 2018-03-27 DIAGNOSIS — G40.909 EPILEPSY, UNSPECIFIED, NOT INTRACTABLE, WITHOUT STATUS EPILEPTICUS: ICD-10-CM

## 2018-03-27 DIAGNOSIS — R74.0 NONSPECIFIC ELEVATION OF LEVELS OF TRANSAMINASE AND LACTIC ACID DEHYDROGENASE [LDH]: ICD-10-CM

## 2018-03-27 DIAGNOSIS — R56.9 UNSPECIFIED CONVULSIONS: ICD-10-CM

## 2018-03-27 DIAGNOSIS — Z59.0 HOMELESSNESS: ICD-10-CM

## 2018-03-27 LAB
ALBUMIN SERPL ELPH-MCNC: 4.3 G/DL — SIGNIFICANT CHANGE UP (ref 3.3–5.2)
ALP SERPL-CCNC: 50 U/L — SIGNIFICANT CHANGE UP (ref 40–120)
ALT FLD-CCNC: 45 U/L — HIGH
AMPHET UR-MCNC: NEGATIVE — SIGNIFICANT CHANGE UP
ANION GAP SERPL CALC-SCNC: 13 MMOL/L — SIGNIFICANT CHANGE UP (ref 5–17)
APPEARANCE UR: CLEAR — SIGNIFICANT CHANGE UP
AST SERPL-CCNC: 37 U/L — SIGNIFICANT CHANGE UP
BACTERIA # UR AUTO: ABNORMAL
BARBITURATES UR SCN-MCNC: NEGATIVE — SIGNIFICANT CHANGE UP
BENZODIAZ UR-MCNC: NEGATIVE — SIGNIFICANT CHANGE UP
BILIRUB SERPL-MCNC: 0.9 MG/DL — SIGNIFICANT CHANGE UP (ref 0.4–2)
BILIRUB UR-MCNC: NEGATIVE — SIGNIFICANT CHANGE UP
BUN SERPL-MCNC: 14 MG/DL — SIGNIFICANT CHANGE UP (ref 8–20)
CALCIUM SERPL-MCNC: 9.4 MG/DL — SIGNIFICANT CHANGE UP (ref 8.6–10.2)
CHLORIDE SERPL-SCNC: 102 MMOL/L — SIGNIFICANT CHANGE UP (ref 98–107)
CK MB CFR SERPL CALC: 3.3 NG/ML — SIGNIFICANT CHANGE UP (ref 0–6.7)
CK MB CFR SERPL CALC: 3.4 NG/ML — SIGNIFICANT CHANGE UP (ref 0–6.7)
CK SERPL-CCNC: 464 U/L — HIGH (ref 30–200)
CK SERPL-CCNC: 474 U/L — HIGH (ref 30–200)
CO2 SERPL-SCNC: 26 MMOL/L — SIGNIFICANT CHANGE UP (ref 22–29)
COCAINE METAB.OTHER UR-MCNC: NEGATIVE — SIGNIFICANT CHANGE UP
COD CRY URNS QL: ABNORMAL
COLOR SPEC: YELLOW — SIGNIFICANT CHANGE UP
CREAT SERPL-MCNC: 0.97 MG/DL — SIGNIFICANT CHANGE UP (ref 0.5–1.3)
DIFF PNL FLD: NEGATIVE — SIGNIFICANT CHANGE UP
EOSINOPHIL # BLD AUTO: 0 K/UL — SIGNIFICANT CHANGE UP (ref 0–0.5)
EOSINOPHIL NFR BLD AUTO: 0.6 % — SIGNIFICANT CHANGE UP (ref 0–5)
EPI CELLS # UR: SIGNIFICANT CHANGE UP
GLUCOSE SERPL-MCNC: 91 MG/DL — SIGNIFICANT CHANGE UP (ref 70–115)
GLUCOSE UR QL: NEGATIVE MG/DL — SIGNIFICANT CHANGE UP
HCT VFR BLD CALC: 46 % — SIGNIFICANT CHANGE UP (ref 42–52)
HGB BLD-MCNC: 15.6 G/DL — SIGNIFICANT CHANGE UP (ref 14–18)
KETONES UR-MCNC: ABNORMAL
LEUKOCYTE ESTERASE UR-ACNC: NEGATIVE — SIGNIFICANT CHANGE UP
LYMPHOCYTES # BLD AUTO: 3 K/UL — SIGNIFICANT CHANGE UP (ref 1–4.8)
LYMPHOCYTES # BLD AUTO: 46.5 % — SIGNIFICANT CHANGE UP (ref 20–55)
MAGNESIUM SERPL-MCNC: 1.9 MG/DL — SIGNIFICANT CHANGE UP (ref 1.6–2.6)
MCHC RBC-ENTMCNC: 28.8 PG — SIGNIFICANT CHANGE UP (ref 27–31)
MCHC RBC-ENTMCNC: 33.9 G/DL — SIGNIFICANT CHANGE UP (ref 32–36)
MCV RBC AUTO: 85 FL — SIGNIFICANT CHANGE UP (ref 80–94)
METHADONE UR-MCNC: NEGATIVE — SIGNIFICANT CHANGE UP
MONOCYTES # BLD AUTO: 0.7 K/UL — SIGNIFICANT CHANGE UP (ref 0–0.8)
MONOCYTES NFR BLD AUTO: 10.8 % — HIGH (ref 3–10)
NEUTROPHILS # BLD AUTO: 2.7 K/UL — SIGNIFICANT CHANGE UP (ref 1.8–8)
NEUTROPHILS NFR BLD AUTO: 41.9 % — SIGNIFICANT CHANGE UP (ref 37–73)
NITRITE UR-MCNC: NEGATIVE — SIGNIFICANT CHANGE UP
OPIATES UR-MCNC: NEGATIVE — SIGNIFICANT CHANGE UP
PCP SPEC-MCNC: SIGNIFICANT CHANGE UP
PCP UR-MCNC: NEGATIVE — SIGNIFICANT CHANGE UP
PH UR: 6 — SIGNIFICANT CHANGE UP (ref 5–8)
PHOSPHATE SERPL-MCNC: 4.4 MG/DL — SIGNIFICANT CHANGE UP (ref 2.4–4.7)
PLATELET # BLD AUTO: 162 K/UL — SIGNIFICANT CHANGE UP (ref 150–400)
POTASSIUM SERPL-MCNC: 4 MMOL/L — SIGNIFICANT CHANGE UP (ref 3.5–5.3)
POTASSIUM SERPL-SCNC: 4 MMOL/L — SIGNIFICANT CHANGE UP (ref 3.5–5.3)
PROT SERPL-MCNC: 7.2 G/DL — SIGNIFICANT CHANGE UP (ref 6.6–8.7)
PROT UR-MCNC: 15 MG/DL
RBC # BLD: 5.41 M/UL — SIGNIFICANT CHANGE UP (ref 4.6–6.2)
RBC # FLD: 12.7 % — SIGNIFICANT CHANGE UP (ref 11–15.6)
RBC CASTS # UR COMP ASSIST: NEGATIVE /HPF — SIGNIFICANT CHANGE UP (ref 0–4)
SODIUM SERPL-SCNC: 141 MMOL/L — SIGNIFICANT CHANGE UP (ref 135–145)
SP GR SPEC: 1.02 — SIGNIFICANT CHANGE UP (ref 1.01–1.02)
THC UR QL: POSITIVE
UROBILINOGEN FLD QL: 1 MG/DL
WBC # BLD: 6.4 K/UL — SIGNIFICANT CHANGE UP (ref 4.8–10.8)
WBC # FLD AUTO: 6.4 K/UL — SIGNIFICANT CHANGE UP (ref 4.8–10.8)
WBC UR QL: SIGNIFICANT CHANGE UP

## 2018-03-27 PROCEDURE — 12345: CPT | Mod: NC

## 2018-03-27 PROCEDURE — 71045 X-RAY EXAM CHEST 1 VIEW: CPT | Mod: 26

## 2018-03-27 PROCEDURE — 95819 EEG AWAKE AND ASLEEP: CPT | Mod: 26

## 2018-03-27 PROCEDURE — 99497 ADVNCD CARE PLAN 30 MIN: CPT | Mod: 25

## 2018-03-27 PROCEDURE — 99223 1ST HOSP IP/OBS HIGH 75: CPT

## 2018-03-27 RX ORDER — LACOSAMIDE 50 MG/1
100 TABLET ORAL ONCE
Qty: 0 | Refills: 0 | Status: DISCONTINUED | OUTPATIENT
Start: 2018-03-27 | End: 2018-03-27

## 2018-03-27 RX ORDER — SODIUM CHLORIDE 9 MG/ML
1000 INJECTION, SOLUTION INTRAVENOUS
Qty: 0 | Refills: 0 | Status: DISCONTINUED | OUTPATIENT
Start: 2018-03-27 | End: 2018-03-30

## 2018-03-27 RX ORDER — ONDANSETRON 8 MG/1
4 TABLET, FILM COATED ORAL EVERY 6 HOURS
Qty: 0 | Refills: 0 | Status: DISCONTINUED | OUTPATIENT
Start: 2018-03-27 | End: 2018-03-30

## 2018-03-27 RX ORDER — LACOSAMIDE 50 MG/1
100 TABLET ORAL
Qty: 0 | Refills: 0 | Status: DISCONTINUED | OUTPATIENT
Start: 2018-03-27 | End: 2018-03-30

## 2018-03-27 RX ORDER — DIVALPROEX SODIUM 500 MG/1
1000 TABLET, DELAYED RELEASE ORAL ONCE
Qty: 0 | Refills: 0 | Status: COMPLETED | OUTPATIENT
Start: 2018-03-27 | End: 2018-03-27

## 2018-03-27 RX ORDER — CLONAZEPAM 1 MG
0.25 TABLET ORAL AT BEDTIME
Qty: 0 | Refills: 0 | Status: DISCONTINUED | OUTPATIENT
Start: 2018-03-27 | End: 2018-03-30

## 2018-03-27 RX ORDER — DIVALPROEX SODIUM 500 MG/1
1000 TABLET, DELAYED RELEASE ORAL
Qty: 0 | Refills: 0 | Status: DISCONTINUED | OUTPATIENT
Start: 2018-03-27 | End: 2018-03-30

## 2018-03-27 RX ADMIN — LACOSAMIDE 100 MILLIGRAM(S): 50 TABLET ORAL at 19:03

## 2018-03-27 RX ADMIN — DIVALPROEX SODIUM 1000 MILLIGRAM(S): 500 TABLET, DELAYED RELEASE ORAL at 06:58

## 2018-03-27 RX ADMIN — SODIUM CHLORIDE 150 MILLILITER(S): 9 INJECTION, SOLUTION INTRAVENOUS at 11:00

## 2018-03-27 RX ADMIN — LACOSAMIDE 100 MILLIGRAM(S): 50 TABLET ORAL at 03:00

## 2018-03-27 RX ADMIN — DIVALPROEX SODIUM 1000 MILLIGRAM(S): 500 TABLET, DELAYED RELEASE ORAL at 19:03

## 2018-03-27 RX ADMIN — Medication 2 MILLIGRAM(S): at 05:10

## 2018-03-27 RX ADMIN — SODIUM CHLORIDE 150 MILLILITER(S): 9 INJECTION, SOLUTION INTRAVENOUS at 06:58

## 2018-03-27 RX ADMIN — DIVALPROEX SODIUM 1000 MILLIGRAM(S): 500 TABLET, DELAYED RELEASE ORAL at 02:46

## 2018-03-27 RX ADMIN — LACOSAMIDE 100 MILLIGRAM(S): 50 TABLET ORAL at 06:58

## 2018-03-27 SDOH — ECONOMIC STABILITY - HOUSING INSECURITY: HOMELESSNESS: Z59.0

## 2018-03-27 NOTE — H&P ADULT - NSHPSOCIALHISTORY_GEN_ALL_CORE
+THC use  lives at homeless shelter  denies etoh or smoking +THC use  lives at homeless shelter  denies etoh or smoking

## 2018-03-27 NOTE — H&P ADULT - PROBLEM SELECTOR PLAN 1
cont vipmat and divalproex  cont klonopin qhs prn insomnia   zofran prn  seizure fall and aspiration precautions  consider re consulting neurology-pt planned for outpt f/u and likely candidate for surgical intervention  SCD boots for DVT prophylaxis cont vipmat and divalproex  cont klonopin qhs prn insomnia   zofran prn  seizure fall and aspiration precautions  consider re consulting neurology-pt planned for outpt f/u and likely candidate for surgical intervention  SCD boots for DVT prophylaxis  f/u chest xray as pt may have aspirated during this episode as food at bedside  f/u U/A, cbc, cmp, mg, phos, ck

## 2018-03-27 NOTE — ED ADULT NURSE REASSESSMENT NOTE - NS ED NURSE REASSESS COMMENT FT1
pt a+ox3, able to follow commands but takes longer to process since last seizure.
family @ bedside, states pt is not himself, seems very out of it and they do not feel safe bring pt back to shelter where he currently lives. MD informed, pt can remain in ED until AM to see if pt improves at all.
pt in no apparent distress, resting comfortably in bed, IV patent and flushing without difficulty, no signs of infiltration.

## 2018-03-27 NOTE — PROGRESS NOTE ADULT - ASSESSMENT
24 yo male with known seizure disorder, readmitted due to breakthrough seizure    Problem/Plan - 1:  ·  Problem: Recurrent seizures.  Plan: cont vipmat and divalproex.  cont klonopin qhs prn insomnia.  Neurology reconsult appreciated and D/w Dr. Veras.  Planning repeat EEG, possible further monitoring as inpatient.      Problem/Plan - 2:  ·  Problem: Marijuana abuse.  Plan: drug cessation counseling given.  SW consult.     Problem/Plan - 3:  ·  Problem: Homeless.  Plan: SW consult.     Problem/Plan - 4:  ·  Problem: Elevated CK.  Plan: IVF for gentle hydration.  likely due to recurrent seizure activity  f/u rpt CK.     Problem/Plan - 5:  ·  Problem: Transaminitis.  Plan: likely reactive.  continue to trend.  Hepatitis panel.    DVT Proph OOB. 24 yo male with known seizure disorder, readmitted due to breakthrough seizure    Problem/Plan - 1:  ·  Problem: Recurrent seizures.  Plan: cont vipmat and divalproex.  cont klonopin qhs prn insomnia.  Neurology reconsult appreciated and D/w Dr. Veras.  Planning repeat EEG, possible further monitoring as inpatient.  Patient with mesial temporal sclerosis on MRI, which may be refractory to AEDs per Neurology. 	    Problem/Plan - 2:  ·  Problem: Marijuana abuse.  Plan: drug cessation counseling given.  SW consult.     Problem/Plan - 3:  ·  Problem: Homeless.  Plan: SW consult.     Problem/Plan - 4:  ·  Problem: Elevated CK.  Plan: IVF for gentle hydration.  likely due to recurrent seizure activity  f/u rpt CK.     Problem/Plan - 5:  ·  Problem: Transaminitis.  Plan: likely reactive.  continue to trend.  Hepatitis panel.    DVT Proph OOB.

## 2018-03-27 NOTE — PROGRESS NOTE ADULT - SUBJECTIVE AND OBJECTIVE BOX
INTERVAL HISTORY:  Patient was disscharged yesterday back to group home where he had another spell and was sent back to the ED  He has been readmitted      VITAL SIGNS:  Vital Signs Last 24 Hrs  T(C): 36.6 (27 Mar 2018 07:48), Max: 36.7 (27 Mar 2018 03:13)  T(F): 97.9 (27 Mar 2018 07:48), Max: 98 (27 Mar 2018 03:13)  HR: 61 (27 Mar 2018 07:48) (61 - 71)  BP: 167/106 (27 Mar 2018 07:48) (135/80 - 167/106)  BP(mean): --  RR: 18 (27 Mar 2018 07:48) (16 - 20)  SpO2: 100% (27 Mar 2018 07:48) (97% - 100%)    PHYSICAL EXAMINATION:    Mentation:  nl;  Language/Speech:nl  CN:nl  Visual Fields:nl  Motor:nl  Sensory:nl  DTR:nl  Babinski:nl      MEDS:  MEDICATIONS  (STANDING):  diVALproex DR 1000 milliGRAM(s) Oral two times a day  lacosamide 100 milliGRAM(s) Oral two times a day  lactated ringers. 1000 milliLiter(s) (150 mL/Hr) IV Continuous <Continuous>    MEDICATIONS  (PRN):  clonazePAM Tablet 0.25 milliGRAM(s) Oral at bedtime PRN insomnia  LORazepam   Injectable 2 milliGRAM(s) IV Push once PRN Seizure activity  ondansetron Injectable 4 milliGRAM(s) IV Push every 6 hours PRN Nausea      LABS:                          15.6   6.4   )-----------( 162      ( 27 Mar 2018 05:26 )             46.0     03-27    141  |  102  |  14.0  ----------------------------<  91  4.0   |  26.0  |  0.97    Ca    9.4      27 Mar 2018 05:26  Phos  4.4     03-27  Mg     1.9     03-27    TPro  7.2  /  Alb  4.3  /  TBili  0.9  /  DBili  x   /  AST  37  /  ALT  45<H>  /  AlkPhos  50  03-27    LIVER FUNCTIONS - ( 27 Mar 2018 05:26 )  Alb: 4.3 g/dL / Pro: 7.2 g/dL / ALK PHOS: 50 U/L / ALT: 45 U/L / AST: 37 U/L / GGT: x               RADIOLOGY & ADDITIONAL STUDIES:      IMPRESSION & PLAN:    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 May need longer term monitoring  Contin current doses of Depakote and Vimpat for now.

## 2018-03-27 NOTE — H&P ADULT - HISTORY OF PRESENT ILLNESS
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 Pt was in observation in ED overnight and had second seizure episode, this time witnessed by two physicians, which resolved with IV ativan, and for which he is still post ictal. During last admission pt was seen by neurology and started on Vimpat.  No recurrent seizures noted afterwards until en route to homeless shelter where he and his wife reena, reside. Pt currently denies sob, n/v/d, tongue biting, wounds, lacerations, or trauma during episode, cough, fever, chills, HA, cp, palpitations, weakness, numbness.

## 2018-03-27 NOTE — H&P ADULT - NEUROLOGICAL DETAILS
responds to pain/responds to verbal commands/sensation intact/cranial nerves intact/alert and oriented x 3/no spontaneous movement

## 2018-03-28 LAB
ALBUMIN SERPL ELPH-MCNC: 4.4 G/DL — SIGNIFICANT CHANGE UP (ref 3.3–5.2)
ALP SERPL-CCNC: 48 U/L — SIGNIFICANT CHANGE UP (ref 40–120)
ALT FLD-CCNC: 58 U/L — HIGH
ANION GAP SERPL CALC-SCNC: 13 MMOL/L — SIGNIFICANT CHANGE UP (ref 5–17)
AST SERPL-CCNC: 43 U/L — HIGH
BILIRUB DIRECT SERPL-MCNC: 0.2 MG/DL — SIGNIFICANT CHANGE UP (ref 0–0.3)
BILIRUB INDIRECT FLD-MCNC: 0.7 MG/DL — SIGNIFICANT CHANGE UP (ref 0.2–1)
BILIRUB SERPL-MCNC: 0.9 MG/DL — SIGNIFICANT CHANGE UP (ref 0.4–2)
BUN SERPL-MCNC: 13 MG/DL — SIGNIFICANT CHANGE UP (ref 8–20)
CALCIUM SERPL-MCNC: 9.6 MG/DL — SIGNIFICANT CHANGE UP (ref 8.6–10.2)
CHLORIDE SERPL-SCNC: 101 MMOL/L — SIGNIFICANT CHANGE UP (ref 98–107)
CK MB CFR SERPL CALC: 3.2 NG/ML — SIGNIFICANT CHANGE UP (ref 0–6.7)
CK SERPL-CCNC: 556 U/L — HIGH (ref 30–200)
CO2 SERPL-SCNC: 26 MMOL/L — SIGNIFICANT CHANGE UP (ref 22–29)
CREAT SERPL-MCNC: 0.94 MG/DL — SIGNIFICANT CHANGE UP (ref 0.5–1.3)
CULTURE RESULTS: SIGNIFICANT CHANGE UP
CULTURE RESULTS: SIGNIFICANT CHANGE UP
GLUCOSE SERPL-MCNC: 88 MG/DL — SIGNIFICANT CHANGE UP (ref 70–115)
HCT VFR BLD CALC: 47.4 % — SIGNIFICANT CHANGE UP (ref 42–52)
HGB BLD-MCNC: 15.8 G/DL — SIGNIFICANT CHANGE UP (ref 14–18)
MCHC RBC-ENTMCNC: 28.4 PG — SIGNIFICANT CHANGE UP (ref 27–31)
MCHC RBC-ENTMCNC: 33.3 G/DL — SIGNIFICANT CHANGE UP (ref 32–36)
MCV RBC AUTO: 85.3 FL — SIGNIFICANT CHANGE UP (ref 80–94)
PLATELET # BLD AUTO: 146 K/UL — LOW (ref 150–400)
POTASSIUM SERPL-MCNC: 4.2 MMOL/L — SIGNIFICANT CHANGE UP (ref 3.5–5.3)
POTASSIUM SERPL-SCNC: 4.2 MMOL/L — SIGNIFICANT CHANGE UP (ref 3.5–5.3)
PROT SERPL-MCNC: 7.2 G/DL — SIGNIFICANT CHANGE UP (ref 6.6–8.7)
RBC # BLD: 5.56 M/UL — SIGNIFICANT CHANGE UP (ref 4.6–6.2)
RBC # FLD: 12.6 % — SIGNIFICANT CHANGE UP (ref 11–15.6)
SODIUM SERPL-SCNC: 140 MMOL/L — SIGNIFICANT CHANGE UP (ref 135–145)
SPECIMEN SOURCE: SIGNIFICANT CHANGE UP
SPECIMEN SOURCE: SIGNIFICANT CHANGE UP
WBC # BLD: 5.1 K/UL — SIGNIFICANT CHANGE UP (ref 4.8–10.8)
WBC # FLD AUTO: 5.1 K/UL — SIGNIFICANT CHANGE UP (ref 4.8–10.8)

## 2018-03-28 PROCEDURE — 95819 EEG AWAKE AND ASLEEP: CPT | Mod: 26

## 2018-03-28 PROCEDURE — 99233 SBSQ HOSP IP/OBS HIGH 50: CPT

## 2018-03-28 RX ADMIN — LACOSAMIDE 100 MILLIGRAM(S): 50 TABLET ORAL at 17:30

## 2018-03-28 RX ADMIN — DIVALPROEX SODIUM 1000 MILLIGRAM(S): 500 TABLET, DELAYED RELEASE ORAL at 06:43

## 2018-03-28 RX ADMIN — DIVALPROEX SODIUM 1000 MILLIGRAM(S): 500 TABLET, DELAYED RELEASE ORAL at 17:30

## 2018-03-28 RX ADMIN — LACOSAMIDE 100 MILLIGRAM(S): 50 TABLET ORAL at 06:43

## 2018-03-28 NOTE — PROGRESS NOTE ADULT - SUBJECTIVE AND OBJECTIVE BOX
Patient: JETHRO SPANN [id number] 23y Male                 Internal Medicine Hospitalist Progress Note - Dr. 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  He had a witnessed seizure in ED.  Recently started on Vimpat.  EEG: abnormal EEG recording due to the presence of occasional and sporadic sharp wave activity as described. No clear localization. It is somewhat more suggestive of a generalized epilepsy rather than focal seizure disorder.  Pt seen by Neuro, awaiting extended EEG monitoring.      Pt denies complaints, states he slept well overnight.  No noted seizure activity.      ____________________PHYSICAL EXAM:  Vitals reviewed as indicated below  GENERAL:  NAD Alert and Oriented x 3   HEENT: NCAT  CARDIOVASCULAR:  S1, S2  LUNGS: CTAB  ABDOMEN:  soft, (-) tenderness, (-) distension, (+) bowel sounds, (-) guarding, (-) rebound (-) rigidity  EXTREMITIES:  no cyanosis / clubbing / edema.   ____________________    VITALS:  Vital Signs Last 24 Hrs  T(C): 36.5 (28 Mar 2018 09:19), Max: 37.1 (27 Mar 2018 15:59)  T(F): 97.7 (28 Mar 2018 09:19), Max: 98.7 (27 Mar 2018 15:59)  HR: 66 (28 Mar 2018 09:19) (66 - 74)  BP: 139/84 (28 Mar 2018 09:19) (138/86 - 149/93)  BP(mean): --  RR: 18 (28 Mar 2018 09:19) (16 - 18)  SpO2: 98% (28 Mar 2018 09:19) (97% - 100%) Daily     Daily   CAPILLARY BLOOD GLUCOSE        I&O's Summary    27 Mar 2018 07:01  -  28 Mar 2018 07:00  --------------------------------------------------------  IN: 1200 mL / OUT: 0 mL / NET: 1200 mL        LABS:                        15.6   6.4   )-----------( 162      ( 27 Mar 2018 05:26 )             46.0         141  |  102  |  14.0  ----------------------------<  91  4.0   |  26.0  |  0.97    Ca    9.4      27 Mar 2018 05:26  Phos  4.4       Mg     1.9         TPro  7.2  /  Alb  4.3  /  TBili  0.9  /  DBili  x   /  AST  37  /  ALT  45<H>  /  AlkPhos  50        LIVER FUNCTIONS - ( 27 Mar 2018 05:26 )  Alb: 4.3 g/dL / Pro: 7.2 g/dL / ALK PHOS: 50 U/L / ALT: 45 U/L / AST: 37 U/L / GGT: x           Urinalysis Basic - ( 27 Mar 2018 08:48 )    Color: Yellow / Appearance: Clear / S.020 / pH: x  Gluc: x / Ketone: Trace  / Bili: Negative / Urobili: 1 mg/dL   Blood: x / Protein: 15 mg/dL / Nitrite: Negative   Leuk Esterase: Negative / RBC: Negative /HPF / WBC 0-2   Sq Epi: x / Non Sq Epi: Occasional / Bacteria: Occasional      CARDIAC MARKERS ( 27 Mar 2018 13:38 )  x     / x     / 464 U/L / x     / 3.3 ng/mL  CARDIAC MARKERS ( 27 Mar 2018 05:26 )  x     / x     / 474 U/L / x     / 3.4 ng/mL  CARDIAC MARKERS ( 26 Mar 2018 22:00 )  x     / x     / 545 U/L / x     / 4.0 ng/mL        MEDICATIONS:  clonazePAM Tablet 0.25 milliGRAM(s) Oral at bedtime PRN  diVALproex DR 1000 milliGRAM(s) Oral two times a day  lacosamide 100 milliGRAM(s) Oral two times a day  lactated ringers. 1000 milliLiter(s) IV Continuous <Continuous>  LORazepam   Injectable 2 milliGRAM(s) IV Push once PRN  ondansetron Injectable 4 milliGRAM(s) IV Push every 6 hours PRN

## 2018-03-28 NOTE — PROGRESS NOTE ADULT - ASSESSMENT
22 yo male with known seizure disorder, readmitted due to breakthrough seizure    Problem/Plan - 1:  ·  Problem: Recurrent seizures.  Plan: cont vipmat and divalproex.  Abnormal EEG noted.  Neuro arranging for extended EEG monitoring.  Mesial temporal sclerosis on MRI, which may be refractory to AEDs per Neurology. 	    Problem/Plan - 2:  ·  Problem: Marijuana abuse.  Plan: drug cessation counseling given.  SW consult.     Problem/Plan - 3:  ·  Problem: Homeless.  Plan: SW consult.     Problem/Plan - 4:  ·  Problem: Rhabdomyolysis - Elevated CK, stable.  Cr wnl.  Plan: IVF for gentle hydration.  likely due to recurrent seizure activity  f/u rpt CK.     Problem/Plan - 5:  ·  Problem: Mild Transaminitis.  Plan: likely reactive.  continue to trend.  Hepatitis panel.    DVT Proph OOB.   The Hospitalist Service will assume care of this patient beginning tomorrow.

## 2018-03-29 LAB
HAV IGM SER-ACNC: SIGNIFICANT CHANGE UP
HBV CORE IGM SER-ACNC: SIGNIFICANT CHANGE UP
HBV SURFACE AG SER-ACNC: SIGNIFICANT CHANGE UP
HCV AB S/CO SERPL IA: 0.11 S/CO — SIGNIFICANT CHANGE UP
HCV AB SERPL-IMP: SIGNIFICANT CHANGE UP

## 2018-03-29 PROCEDURE — 99233 SBSQ HOSP IP/OBS HIGH 50: CPT

## 2018-03-29 PROCEDURE — 95951: CPT | Mod: 26

## 2018-03-29 RX ADMIN — Medication 400 MILLIGRAM(S): at 14:30

## 2018-03-29 RX ADMIN — Medication 100 MILLIGRAM(S): at 23:29

## 2018-03-29 RX ADMIN — LACOSAMIDE 100 MILLIGRAM(S): 50 TABLET ORAL at 06:31

## 2018-03-29 RX ADMIN — DIVALPROEX SODIUM 1000 MILLIGRAM(S): 500 TABLET, DELAYED RELEASE ORAL at 06:31

## 2018-03-29 RX ADMIN — SODIUM CHLORIDE 150 MILLILITER(S): 9 INJECTION, SOLUTION INTRAVENOUS at 06:31

## 2018-03-29 RX ADMIN — LACOSAMIDE 100 MILLIGRAM(S): 50 TABLET ORAL at 18:24

## 2018-03-29 RX ADMIN — DIVALPROEX SODIUM 1000 MILLIGRAM(S): 500 TABLET, DELAYED RELEASE ORAL at 18:24

## 2018-03-29 RX ADMIN — SODIUM CHLORIDE 150 MILLILITER(S): 9 INJECTION, SOLUTION INTRAVENOUS at 18:24

## 2018-03-29 NOTE — PROGRESS NOTE ADULT - ASSESSMENT
22 yo male with known seizure disorder, readmitted due to breakthrough seizure    Problem/Plan - 1:  ·  Problem: Recurrent seizures.  Plan: cont vipmat and divalproex.  Abnormal EEG noted.  Neuro arranging for extended EEG monitoring.  Mesial temporal sclerosis on MRI, which may be refractory to AEDs per Neurology - discussed with Dr Veras- cont same meds today  he will review EEG - if not seizure can DC in AM     Problem/Plan - 2:  ·  Problem: Marijuana abuse.  Plan: drug cessation counseling given.  SW consult.     Problem/Plan - 3:  ·  Problem: Homeless.  Plan: SW consult.     Problem/Plan - 4:  ·  Problem: Rhabdomyolysis - Elevated CK, stable.  Cr wnl.  Plan: IVF for gentle hydration.  likely due to recurrent seizure activity  f/u rpt CK.     Problem/Plan - 5:  ·  Problem: Mild Transaminitis.  Plan: likely reactive.  continue to trend.  Hepatitis panel.    DVT Proph OOB.

## 2018-03-29 NOTE — PROGRESS NOTE ADULT - SUBJECTIVE AND OBJECTIVE BOX
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 Pt was in observation in ED overnight and had second seizure episode, this time witnessed by two physicians, which resolved with IV ativan, and for which he is still post ictal. During last admission pt was seen by neurology and started on Vimpat.  No recurrent seizures noted afterwards until en route to homeless shelter where he and his wife reena, reside. Pt currently denies sob, n/v/d, tongue biting, wounds, lacerations, or trauma during episode, cough, fever, chills, HA, cp, palpitations, weakness, numbness. (27 Mar 2018 05:21)    The patient was seen and evaluated   The patient is in no acute distress.  Denied any fever chest pain, palpitations, shortness of breath, abdominal pain, fever, dysuria, cough, edema   Complains of just finished EEG     I&O's Summary    28 Mar 2018 07:01  -  29 Mar 2018 07:00  --------------------------------------------------------  IN: 450 mL / OUT: 0 mL / NET: 450 mL      Allergies    No Known Allergies    Intolerances      HEALTH ISSUES - PROBLEM Dx:  Transaminitis: Transaminitis  Elevated CK: Elevated CK  Homeless: Homeless  Marijuana abuse: Marijuana abuse  Recurrent seizures: Recurrent seizures        PAST MEDICAL & SURGICAL HISTORY:  Concussion  Seizures  No significant past surgical history          Vital Signs Last 24 Hrs  T(C): 36.4 (29 Mar 2018 08:12), Max: 36.9 (28 Mar 2018 16:14)  T(F): 97.5 (29 Mar 2018 08:12), Max: 98.5 (28 Mar 2018 16:14)  HR: 65 (29 Mar 2018 08:12) (55 - 95)  BP: 141/89 (29 Mar 2018 08:12) (124/79 - 146/76)  BP(mean): --  RR: 18 (29 Mar 2018 08:12) (18 - 20)  SpO2: 100% (29 Mar 2018 08:12) (98% - 100%)T(C): 36.4 (03-29-18 @ 08:12), Max: 36.9 (03-28-18 @ 16:14)  HR: 65 (03-29-18 @ 08:12) (55 - 95)  BP: 141/89 (03-29-18 @ 08:12) (124/79 - 146/76)  RR: 18 (03-29-18 @ 08:12) (18 - 20)  SpO2: 100% (03-29-18 @ 08:12) (98% - 100%)  Wt(kg): --    PHYSICAL EXAM:    GENERAL: NAD, well-groomed, well-developed  HEAD:  Atraumatic, Normocephalic  EYES: EOMI,  conjunctiva and sclera clear  ENMT:  Moist mucous membranes,  No lesions  NERVOUS SYSTEM:  Alert & Oriented X3, walks around in room Moves upper and lower extremities; CNS-II-XII  CHEST/LUNG: Clear to auscultation bilaterally; No rales, rhonchi, wheezing,   HEART: Regular rate and rhythm; No murmurs,   ABDOMEN: Soft, Nontender, Nondistended; Bowel sounds present  EXTREMITIES:  Peripheral Pulses, No  cyanosis, or edema  SKIN: No rashes or lesions  psychiatry- mood and affect approprite, Insight and judgement intact     clonazePAM Tablet 0.25 milliGRAM(s) Oral at bedtime PRN  diVALproex DR 1000 milliGRAM(s) Oral two times a day  lacosamide 100 milliGRAM(s) Oral two times a day  lactated ringers. 1000 milliLiter(s) IV Continuous <Continuous>  LORazepam   Injectable 2 milliGRAM(s) IV Push once PRN  ondansetron Injectable 4 milliGRAM(s) IV Push every 6 hours PRN  phenytoin   Capsule 400 milliGRAM(s) Oral once  phenytoin   Capsule 100 milliGRAM(s) Oral three times a day      LABS:                          15.8   5.1   )-----------( 146      ( 28 Mar 2018 10:31 )             47.4     03-28    140  |  101  |  13.0  ----------------------------<  88  4.2   |  26.0  |  0.94    Ca    9.6      28 Mar 2018 10:31    TPro  7.2  /  Alb  4.4  /  TBili  0.9  /  DBili  0.2  /  AST  43<H>  /  ALT  58<H>  /  AlkPhos  48  03-28    LIVER FUNCTIONS - ( 28 Mar 2018 10:31 )  Alb: 4.4 g/dL / Pro: 7.2 g/dL / ALK PHOS: 48 U/L / ALT: 58 U/L / AST: 43 U/L / GGT: x             CARDIAC MARKERS ( 28 Mar 2018 10:31 )  x     / x     / 556 U/L / x     / 3.2 ng/mL  CARDIAC MARKERS ( 27 Mar 2018 13:38 )  x     / x     / 464 U/L / x     / 3.3 ng/mL        CAPILLARY BLOOD GLUCOSE          RADIOLOGY & ADDITIONAL TESTS:      Consultant notes reviewed    Case discussed with consultant/provider/ family /patient

## 2018-03-29 NOTE — PROGRESS NOTE ADULT - SUBJECTIVE AND OBJECTIVE BOX
INTERVAL HISTORY:  feels well      VITAL SIGNS:  Vital Signs Last 24 Hrs  T(C): 36.4 (29 Mar 2018 08:12), Max: 36.9 (28 Mar 2018 16:14)  T(F): 97.5 (29 Mar 2018 08:12), Max: 98.5 (28 Mar 2018 16:14)  HR: 65 (29 Mar 2018 08:12) (55 - 95)  BP: 141/89 (29 Mar 2018 08:12) (124/79 - 146/76)  BP(mean): --  RR: 18 (29 Mar 2018 08:12) (18 - 20)  SpO2: 100% (29 Mar 2018 08:12) (98% - 100%)    PHYSICAL EXAMINATION:    Mentation:  nl  Language/Speech:nl  CN:nl  Visual Fields:nl  Motor:nl  Sensory:  DTR:  Babinski:      MEDS:  MEDICATIONS  (STANDING):  diVALproex DR 1000 milliGRAM(s) Oral two times a day  lacosamide 100 milliGRAM(s) Oral two times a day  lactated ringers. 1000 milliLiter(s) (150 mL/Hr) IV Continuous <Continuous>  phenytoin   Capsule 400 milliGRAM(s) Oral once  phenytoin   Capsule 100 milliGRAM(s) Oral three times a day    MEDICATIONS  (PRN):  clonazePAM Tablet 0.25 milliGRAM(s) Oral at bedtime PRN insomnia  LORazepam   Injectable 2 milliGRAM(s) IV Push once PRN Seizure activity  ondansetron Injectable 4 milliGRAM(s) IV Push every 6 hours PRN Nausea      LABS:                          15.8   5.1   )-----------( 146      ( 28 Mar 2018 10:31 )             47.4     03-28    140  |  101  |  13.0  ----------------------------<  88  4.2   |  26.0  |  0.94    Ca    9.6      28 Mar 2018 10:31    TPro  7.2  /  Alb  4.4  /  TBili  0.9  /  DBili  0.2  /  AST  43<H>  /  ALT  58<H>  /  AlkPhos  48  03-28    LIVER FUNCTIONS - ( 28 Mar 2018 10:31 )  Alb: 4.4 g/dL / Pro: 7.2 g/dL / ALK PHOS: 48 U/L / ALT: 58 U/L / AST: 43 U/L / GGT: x               RADIOLOGY & ADDITIONAL STUDIES:    Second routine EEG was normal  However, the overnight 24 hour EEG showed fairly frequent epiletiform activity during     IMPRESSION & PLAN:    Continue Vimpat and Depakote as current  Begin Dilantin and adjust both VPA and PTN to therapeutic levels  He will need long-term care with an epilepsy specialist- Dr. Sinha or Oz in my office  He does not drive  Possible discharge tomorrow

## 2018-03-29 NOTE — EEG REPORT - NS EEG TEXT BOX
[facility] EPILEPSY CENTER  REPORT OF ROUTINE EEG WITH VIDEO    [facility]: [address], [address] [place] [address], Phone: [phone]  [facility]: [address] [place] [address], [place] [address], Phone: [phone]  Office: [address] [facility] [address], [address], [place] [address], Phone: [phone]    Patient Name: Mich Garay    Age: 23 y  : 1995  Patient ID: -, MRN #: -, Location: -  Referring Physician: Joaquin Veras    EEG #: [id number]  Study Date: 3/28/2018		    Technical Information:					  On Instrument: -  Placement and Labeling of Electrodes:  The EEG was performed utilizing 20 channels referential EEG connections (coronal over temporal over parasagittal montage) using all standard 10-20 electrode placements with EKG.  Recording was at a sampling rate of 256 samples per second per channel.  Time synchronized digital video recording was done simultaneously with EEG recording.  A low light infrared camera was used for low light recording.  Trevin and seizure detection algorithms were utilized.    History:  Known seizure disorder since age of 3.  MRI revealed Mesial temporal sclerosis.   Having breakthrough seizures    -    Medication  Vimpat    klonopin    depokote      Study Interpretation:    FINDINGS:  The background was continuous, spontaneously variable and reactive. Patient was drowsy with 7-8 hz theta and alpha seen bilaterally.    Background Slowing:  No generalized background slowing was present.    Focal Slowing:   None was present.    Sleep Background:  Drowsiness was characterized by fragmentation, attenuation, and slowing of the background activity.      Other Paroxysmal Non-Epileptiform Findings:  None were present.    Interictal Epileptiform Activity:   None were present.    Events:  Clinical events: None recorded.  Seizures: None recorded.    Activation Procedures:   Hyperventilation was performed and did not elicit any abnormalities.    Photic stimulation was performed and did not elicit any abnormalities.      Artifacts:  Intermittent myogenic and movement artifacts were noted.    ECG:  The heart rate on single channel ECG was predominantly between 75 BPM.    EEG Summary / Classification:  Unremarkable EEG recorded in the drowsy state.    EEG Impression / Clinical Correlate:  There were no epileptiform abnormalities recorded.          Joaquin Zavala MD  Director of Neurology, [facility]  Attending Epileptologist and , [facility]

## 2018-03-29 NOTE — EEG REPORT - NS EEG TEXT BOX
[facility]  COMPREHENSIVE EPILEPSY CENTER  REPORT OF CONTINUOUS VIDEO EEG    [facility]: [address] [facility] [facility] , [address], [place] [address], Ph#: [phone]  [facility]: [id number] [place] [address], Ph#: [phone]  Office: [address] [facility], [facility] [address], [place] [address] Ph#: [phone]    Patient Name: Mcih Garay    Age: 23 y, : 1995  Patient ID: -, MRN #: -, Saini: -  Referring Physician: Joaquin Veras  EEG #: [id number]    Study Date: 3/28/2018    Study Information:    EEG Recording Technique:  The patient underwent continuous Video-EEG monitoring, using Telemetry System hardware on the XLTek Digital System. EEG and video data were stored on a computer hard drive with important events saved in digital archive files. The material was reviewed by a physician (electroencephalographer / epileptologist) on a daily basis. Trevin and seizure detection algorithms were utilized and reviewed. An EEG Technician attended to the patient, and was available throughout daytime work hours.  The epilepsy center neurologist was available in person or on call 24-hours per day.    EEG Placement and Labeling of Electrodes:  The EEG was performed utilizing 20 channel referential EEG connections (coronal over temporal over parasagittal montage) using all standard 10-20 electrode placements with EKG, with additional electrodes placed in the inferior temporal region using the modified 10-10 montage electrode placements for elective admissions, or if deemed necessary. Recording was at a sampling rate of 256 samples per second per channel. Time synchronized digital video recording was done simultaneously with EEG recording. A low light infrared camera was used for low light recording.     History:  Known seizure disorder since age of 3.  MRI revealed Mesial temporal sclerosis.   Having breakthrough seizures      Medication  Depakote    Klonopin    Vimpat      Interpretation:    [id number]  Starting: 3/28/2018    Daily EEG Visual Analysis  FINDINGS:  The background was continuous, spontaneously variable and reactive.  During wakefulness, the posteriorly dominant rhythm consisted of symmetric, well-modulated 9-10 Hz activity, with amplitude to 30 uV, that attenuated to eye opening.  Low amplitude frontal beta was noted in wakefulness.    Background Slowing:  No generalized background slowing was present.    Focal Slowing:   None was present.    Sleep Background:  Drowsiness was characterized by fragmentation, attenuation, and slowing of the background activity.    Sleep was characterized by the presence of vertex waves, symmetric spindles, and K-complexes.  Stage II sleep transients were not recorded.    Other Paroxysmal Non-Epileptiform Findings:  None were present.    Interictal Epileptiform Activity:   Frequent left centro-temporal and rare right anterior temporal epileptiform spike discharges seen in sleep.    Events:  Clinical events: None recorded.  Seizures: None recorded.    Artifacts:  Intermittent myogenic and movement artifacts were noted.    ECG:  The heart rate on single channel ECG was predominantly between 80 BPM.    EEG Impression:  Abnormal VEEG in the awake, drowsy, and asleep states due to the presence of frequent left centro-temporal and rare right anterior temporal epileptiform spike discharges seen in sleep.  No clinical events recorded.    Clinical Correlate:  The study is consistent with partial epilepsy.      Joaquin Zavala MD  Director of Neurology, [facility]  Attending Epileptologist and , [facility]

## 2018-03-30 VITALS
SYSTOLIC BLOOD PRESSURE: 135 MMHG | TEMPERATURE: 98 F | DIASTOLIC BLOOD PRESSURE: 86 MMHG | RESPIRATION RATE: 18 BRPM | OXYGEN SATURATION: 98 % | HEART RATE: 73 BPM

## 2018-03-30 LAB
CK MB CFR SERPL CALC: 4.2 NG/ML — SIGNIFICANT CHANGE UP (ref 0–6.7)
CK SERPL-CCNC: 442 U/L — HIGH (ref 30–200)
MAGNESIUM SERPL-MCNC: 1.9 MG/DL — SIGNIFICANT CHANGE UP (ref 1.6–2.6)
PHENYTOIN FREE SERPL-MCNC: 3.2 UG/ML — LOW (ref 10–20)
VALPROATE SERPL-MCNC: 118.3 UG/ML — HIGH (ref 50–100)

## 2018-03-30 PROCEDURE — 99239 HOSP IP/OBS DSCHRG MGMT >30: CPT

## 2018-03-30 RX ORDER — LACOSAMIDE 50 MG/1
1 TABLET ORAL
Qty: 60 | Refills: 0 | OUTPATIENT
Start: 2018-03-30 | End: 2018-04-28

## 2018-03-30 RX ORDER — VALPROIC ACID (AS SODIUM SALT) 250 MG/5ML
1 SOLUTION, ORAL ORAL
Qty: 0 | Refills: 0 | COMMUNITY
Start: 2018-03-30 | End: 2018-04-28

## 2018-03-30 RX ORDER — VALPROIC ACID (AS SODIUM SALT) 250 MG/5ML
500 SOLUTION, ORAL ORAL THREE TIMES A DAY
Qty: 0 | Refills: 0 | Status: DISCONTINUED | OUTPATIENT
Start: 2018-03-30 | End: 2018-03-30

## 2018-03-30 RX ORDER — VALPROIC ACID (AS SODIUM SALT) 250 MG/5ML
2 SOLUTION, ORAL ORAL
Qty: 180 | Refills: 0 | OUTPATIENT
Start: 2018-03-30 | End: 2018-04-28

## 2018-03-30 RX ADMIN — Medication 400 MILLIGRAM(S): at 13:17

## 2018-03-30 RX ADMIN — DIVALPROEX SODIUM 1000 MILLIGRAM(S): 500 TABLET, DELAYED RELEASE ORAL at 05:37

## 2018-03-30 RX ADMIN — Medication 500 MILLIGRAM(S): at 13:20

## 2018-03-30 RX ADMIN — Medication 100 MILLIGRAM(S): at 05:37

## 2018-03-30 RX ADMIN — LACOSAMIDE 100 MILLIGRAM(S): 50 TABLET ORAL at 05:36

## 2018-03-30 NOTE — DISCHARGE NOTE ADULT - CARE PLAN
Principal Discharge DX:	Seizure  Goal:	Control seizure.  Assessment and plan of treatment:	Take medicine as reconciled. Follow up with neurology in 1 week.  Secondary Diagnosis:	Elevated CK  Assessment and plan of treatment:	Trended down. Drink plenty of water. F/u with PMD.  Secondary Diagnosis:	Transaminitis  Assessment and plan of treatment:	Trended down. Follow up with PMD and have him repeat LFT in 1-2 week.

## 2018-03-30 NOTE — PROGRESS NOTE ADULT - ASSESSMENT
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 Evaluated by neurology, s/p video EEG consistent with partial epilepsy. Neurology recommended c/w Vimpat, Depakote and dilantin added.     Plan:    Recurrent seizures:  - Abnormal EEG as above.   - C/w Vimpat, divalproex and dilantin.  - Neurology on board,  Mesial temporal sclerosis on MRI, which may be refractory to AEDs per Neurology. He will need long-term care with an epilepsy specialist- Dr. Sinha or Oz     Marijuana abuse:  - Drug cessation counseling given. SW on board. .     Rhabdomyolysis:  - CK trended down, on IVFs.   - Check CPK today, d/c IVFs is CK stable.     Mild Transaminitis:  - Likely reactive. Stable. Hepatitis panel negative.    DVT Proph: Ambulates.     Disposition: Pt homeless from Shelter, SW /CM on board.

## 2018-03-30 NOTE — PROGRESS NOTE ADULT - SUBJECTIVE AND OBJECTIVE BOX
INTERVAL HISTORY:        VITAL SIGNS:  Vital Signs Last 24 Hrs  T(C): 36.7 (30 Mar 2018 09:47), Max: 36.7 (30 Mar 2018 09:47)  T(F): 98 (30 Mar 2018 09:47), Max: 98 (30 Mar 2018 09:47)  HR: 60 (30 Mar 2018 09:47) (60 - 71)  BP: 145/88 (30 Mar 2018 09:47) (143/96 - 145/88)  BP(mean): --  RR: 16 (30 Mar 2018 09:47) (16 - 18)  SpO2: 99% (30 Mar 2018 09:47) (99% - 100%)    PHYSICAL EXAMINATION:    Mentation:    Language/Speech:  CN:  Visual Fields:  Motor:  Sensory:  DTR:  Babinski:      MEDS:  MEDICATIONS  (STANDING):  diVALproex DR 1000 milliGRAM(s) Oral two times a day  lacosamide 100 milliGRAM(s) Oral two times a day  lactated ringers. 1000 milliLiter(s) (150 mL/Hr) IV Continuous <Continuous>  phenytoin   Capsule 100 milliGRAM(s) Oral three times a day    MEDICATIONS  (PRN):  clonazePAM Tablet 0.25 milliGRAM(s) Oral at bedtime PRN insomnia  LORazepam   Injectable 2 milliGRAM(s) IV Push once PRN Seizure activity  ondansetron Injectable 4 milliGRAM(s) IV Push every 6 hours PRN Nausea      LABS:                          15.8   5.1   )-----------( 146      ( 28 Mar 2018 10:31 )             47.4     03-28    140  |  101  |  13.0  ----------------------------<  88  4.2   |  26.0  |  0.94    Ca    9.6      28 Mar 2018 10:31    TPro  7.2  /  Alb  4.4  /  TBili  0.9  /  DBili  0.2  /  AST  43<H>  /  ALT  58<H>  /  AlkPhos  48  03-28    LIVER FUNCTIONS - ( 28 Mar 2018 10:31 )  Alb: 4.4 g/dL / Pro: 7.2 g/dL / ALK PHOS: 48 U/L / ALT: 58 U/L / AST: 43 U/L / GGT: x           Phenytoin Level, Serum: 3.2 ug/mL (03.30.18 @ 07:54)  Valproic Acid Level, Serum: 118.3 ug/mL (03.30.18 @ 07:54)                RADIOLOGY & ADDITIONAL STUDIES:      IMPRESSION & PLAN:    Patient with epilepsy. Generally stable  Dilantin is low and VPA is high    Rec  Dilantin 500mg oral load now then continue 100mg TID  Depakote -reduce to 750Mg BID  Vimpat 100mg BID    Patient will need long-term management at an epilepsy specialty clinic.  Discussed with Hospitalist. Otherwise cleared for discharge INTERVAL HISTORY:  stable, no seizres      VITAL SIGNS:  Vital Signs Last 24 Hrs  T(C): 36.7 (30 Mar 2018 09:47), Max: 36.7 (30 Mar 2018 09:47)  T(F): 98 (30 Mar 2018 09:47), Max: 98 (30 Mar 2018 09:47)  HR: 60 (30 Mar 2018 09:47) (60 - 71)  BP: 145/88 (30 Mar 2018 09:47) (143/96 - 145/88)  BP(mean): --  RR: 16 (30 Mar 2018 09:47) (16 - 18)  SpO2: 99% (30 Mar 2018 09:47) (99% - 100%)    PHYSICAL EXAMINATION:    Mentation:  nl  Language/Speech:nl  CN:nl  Visual Fields:nl  Motor: nl  Sensory:  DTR:  Babinski:      MEDS:  MEDICATIONS  (STANDING):  diVALproex DR 1000 milliGRAM(s) Oral two times a day  lacosamide 100 milliGRAM(s) Oral two times a day  lactated ringers. 1000 milliLiter(s) (150 mL/Hr) IV Continuous <Continuous>  phenytoin   Capsule 100 milliGRAM(s) Oral three times a day    MEDICATIONS  (PRN):  clonazePAM Tablet 0.25 milliGRAM(s) Oral at bedtime PRN insomnia  LORazepam   Injectable 2 milliGRAM(s) IV Push once PRN Seizure activity  ondansetron Injectable 4 milliGRAM(s) IV Push every 6 hours PRN Nausea      LABS:                          15.8   5.1   )-----------( 146      ( 28 Mar 2018 10:31 )             47.4     03-28    140  |  101  |  13.0  ----------------------------<  88  4.2   |  26.0  |  0.94    Ca    9.6      28 Mar 2018 10:31    TPro  7.2  /  Alb  4.4  /  TBili  0.9  /  DBili  0.2  /  AST  43<H>  /  ALT  58<H>  /  AlkPhos  48  03-28    LIVER FUNCTIONS - ( 28 Mar 2018 10:31 )  Alb: 4.4 g/dL / Pro: 7.2 g/dL / ALK PHOS: 48 U/L / ALT: 58 U/L / AST: 43 U/L / GGT: x           Phenytoin Level, Serum: 3.2 ug/mL (03.30.18 @ 07:54)  Valproic Acid Level, Serum: 118.3 ug/mL (03.30.18 @ 07:54)                RADIOLOGY & ADDITIONAL STUDIES:      IMPRESSION & PLAN:    Patient with epilepsy. Generally stable  Dilantin is low and VPA is high    Rec  Dilantin 500mg oral load now then continue 100mg TID  Depakote -reduce to 750Mg BID  Vimpat 100mg BID    Patient will need long-term management- He is followed in our office by Dr You and should see him in 2 weeks  Discussed with Hospitalist. Otherwise cleared for discharge

## 2018-03-30 NOTE — DISCHARGE NOTE ADULT - PLAN OF CARE
Trended down. Drink plenty of water. F/u with PMD. Trended down. Follow up with PMD and have him repeat LFT in 1-2 week. Control seizure. Take medicine as reconciled. Follow up with neurology in 1 week.

## 2018-03-30 NOTE — DISCHARGE NOTE ADULT - MEDICATION SUMMARY - MEDICATIONS TO TAKE
I will START or STAY ON the medications listed below when I get home from the hospital:    phenytoin 100 mg oral capsule, extended release  -- 1 cap(s) by mouth 3 times a day  -- Indication: For Seizure    valproic acid 250 mg oral capsule  -- 2 cap(s) by mouth 3 times a day  -- Indication: For Seizure    clonazePAM  -- 0.25 milligram(s) by mouth once a day (at bedtime)  -- Indication: For insomnia    lacosamide 100 mg oral tablet  -- 1 tab(s) by mouth 2 times a day MDD:2  -- Indication: For Seizure

## 2018-03-30 NOTE — DISCHARGE NOTE ADULT - PATIENT PORTAL LINK FT
You can access the [facility] Patient Portal, offered by [facility], by registering with the following website: [email or web address]

## 2018-03-30 NOTE — DISCHARGE NOTE ADULT - CARE PROVIDER_API CALL
Joqauin Veras), Neurology  [address] [place] [address]  Phone: [phone]  Fax: [phone]    PMD,   Phone: (   )    -  Fax: (   )    -

## 2018-03-30 NOTE — PROGRESS NOTE ADULT - SUBJECTIVE AND OBJECTIVE BOX
JETHRO SPANN Male 23y [id number]    Patient is a 23y old  Male who presents with a chief complaint of Recurrent seizure (27 Mar 2018 05:21)      On service note:  Pt seen /examined at bedside and charts reviewed, no over night event reported by night staff. Pt has no complaints, no more seizure episode reported. Video EEG with partial epilepsy. Neurology on board.    Review of system:  No fever, chills, nausea, vomiting, headache, dizziness, chest pain, SOB or palpitation.      PHYSICAL EXAM:    Vital Signs Last 24 Hrs  T(C): 36.7 (30 Mar 2018 09:47), Max: 36.7 (30 Mar 2018 09:47)  T(F): 98 (30 Mar 2018 09:47), Max: 98 (30 Mar 2018 09:47)  HR: 60 (30 Mar 2018 09:47) (60 - 71)  BP: 145/88 (30 Mar 2018 09:47) (143/96 - 145/88)  BP(mean): --  RR: 16 (30 Mar 2018 09:47) (16 - 18)  SpO2: 99% (30 Mar 2018 09:47) (99% - 100%)    GENERAL: Pt lying comfortably, NAD.  CHEST/LUNG: Clear to auscultatation bilaterally; No wheezing.  HEART: S1S2+, Regular rate and rhythm; No murmurs.  ABDOMEN: Soft, Nontender, Nondistended; Bowel sounds present.  Extremities: No edea, pulses 2+  NEURO: Alert & Oriented X3, walks around in room Moves upper and lower extremities; CNS-II-XII        MEDICATIONS  (STANDING):  diVALproex DR 1000 milliGRAM(s) Oral two times a day  lacosamide 100 milliGRAM(s) Oral two times a day  lactated ringers. 1000 milliLiter(s) (150 mL/Hr) IV Continuous <Continuous>  phenytoin   Capsule 100 milliGRAM(s) Oral three times a day    MEDICATIONS  (PRN):  clonazePAM Tablet 0.25 milliGRAM(s) Oral at bedtime PRN insomnia  LORazepam   Injectable 2 milliGRAM(s) IV Push once PRN Seizure activity  ondansetron Injectable 4 milliGRAM(s) IV Push every 6 hours PRN Nausea        Labs:  LABS:                        15.8   5.1   )-----------( 146      ( 28 Mar 2018 10:31 )             47.4     03-28    140  |  101  |  13.0  ----------------------------<  88  4.2   |  26.0  |  0.94    Ca    9.6      28 Mar 2018 10:31    TPro  7.2  /  Alb  4.4  /  TBili  0.9  /  DBili  0.2  /  AST  43<H>  /  ALT  58<H>  /  AlkPhos  48  03-28        LIVER FUNCTIONS - ( 28 Mar 2018 10:31 )  Alb: 4.4 g/dL / Pro: 7.2 g/dL / ALK PHOS: 48 U/L / ALT: 58 U/L / AST: 43 U/L / GGT: x               CARDIAC MARKERS ( 28 Mar 2018 10:31 )  x     / x     / 556 U/L / x     / 3.2 ng/mL

## 2018-03-30 NOTE — DISCHARGE NOTE ADULT - MEDICATION SUMMARY - MEDICATIONS TO CHANGE
I will SWITCH the dose or number of times a day I take the medications listed below when I get home from the hospital:    divalproex sodium 500 mg oral delayed release tablet  -- 2 tab(s) by mouth 2 times a day

## 2018-03-30 NOTE — DISCHARGE NOTE ADULT - HOSPITAL COURSE
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 Evaluated by neurology, s/p video EEG consistent with partial epilepsy. Neurology recommended c/w Vimpat, Depakote and dilantin added. Pt remained seizure free. Deemed stable for discharge from neurology with outpatient follow up. Discharge medication as: Dilantin 100 tid, vimpat 100 bid and valproic aicd 500 mg tid. Pt to follow up with neurology in 1 week.     PHYSICAL EXAM:    Vital Signs Last 24 Hrs  T(C): 36.7 (30 Mar 2018 09:47), Max: 36.7 (30 Mar 2018 09:47)  T(F): 98 (30 Mar 2018 09:47), Max: 98 (30 Mar 2018 09:47)  HR: 60 (30 Mar 2018 09:47) (60 - 71)  BP: 145/88 (30 Mar 2018 09:47) (143/96 - 145/88)  BP(mean): --  RR: 16 (30 Mar 2018 09:47) (16 - 18)  SpO2: 99% (30 Mar 2018 09:47) (99% - 100%)    GENERAL: Pt lying comfortably, NAD.  CHEST/LUNG: Clear to auscultation bilaterally; No wheezing.  HEART: S1S2+, Regular rate and rhythm; No murmurs.  ABDOMEN: Soft, Nontender, Nondistended; Bowel sounds present.  Extremities: No edea, pulses 2+  NEURO: Alert & Oriented X3, walks around in room Moves upper and lower extremities; CNS-II-XII    Total time spent on discharge 35 minutes.

## 2018-04-27 NOTE — ED ADULT NURSE NOTE - TEMPLATE LIST FOR HEAD TO TOE ASSESSMENT
Assessment:  1  Spinal stenosis of lumbar region, unspecified whether neurogenic claudication present    2  Lumbar spondylosis    3  Lumbar disc herniation        Plan:  The patient is a 77 y o  female with a history lumbar spinal stenosis, lumbar spondylosis, and lumbar disc herniations  Patient presents today with ongoing low back pain, her exam and symptoms are consistent with multilevel spondylosis, and lumbar stenosis noted in her lumbar spine  I discussed with the patient about moving forward with a lumbar epidural steroid injection, to decrease the inflammation in her back, and reduce pain  However, the patient states that she would not like to undergo any invasive procedures at this time  Patient reports that she obtained significant amount of relief with the use of physical therapy in the past   Therefore at this time the patient is requesting to attend physical therapy  I have provided the patient with referral to physical therapy for evaluation and treatment  Patient reports that she is currently well managed with the use of Tylenol, and Aleve as needed for her back pain  I discussed with the patient that she should follow up with her family doctor to address her ongoing issues with incontinence  The patient will follow-up in 8 weeks for medication prescription refill and reevaluation  The patient was advised to contact the office should their symptoms worsen in the interim  The patient was agreeable and verbalized an understanding  History of Present Illness: The patient is a 77 y o  female with a history lumbar spinal stenosis, lumbar spondylosis, and lumbar disc herniations  Patient was last seen office on 1/6/2017, where she was ordered an MRI of her lumbar spine  Patient reports that she was attending physical therapy a year ago, with positive relief of symptoms    However, the patient reports that during physical therapy her heart rate was going down to 40, and she was forced to stop physical therapy  Patient ended up having a pacemaker placed since last visit  Patient presents for a follow up office visit in regards to chronic pain secondary to low back pain  The patient currently reports low back pain that radiates vertically up the patient's back to in between her shoulder blades  Patient denies bilateral leg weakness, or radiation of symptoms down her legs  However, the patient report intermittent restlessness of the legs after sitting for long period of time  Patient reports that she has been incontinent of urine for a year or 2  However, she reports that she underwent surgery for her bladder and had a bladder lift 20-30 years ago  She describes her pain as dull aching intermittent pain  Patient reports that her pain is symptoms have worsened since last visit  Patient contributes the worsening her symptoms due to her being unable to attend physical therapy  Patient currently rates her pain a 4/10 numeric pain scale  Current pain medications includes:  Tylenol, and Aleve as needed for pain  The patient reports that this regimen is providing 50-60% pain relief  The patient is reporting no side effects from this pain medication regimen  I have personally reviewed and/or updated the patient's past medical history, past surgical history, family history, social history, current medications, allergies, and vital signs today  Review of Systems:    Review of Systems   Respiratory: Negative for shortness of breath  Cardiovascular: Negative for chest pain  Gastrointestinal: Negative for constipation, diarrhea, nausea and vomiting  Musculoskeletal: Positive for gait problem and joint swelling  Negative for arthralgias and myalgias  Skin: Negative for rash  Neurological: Negative for dizziness, seizures and weakness  All other systems reviewed and are negative          Past Medical History:   Diagnosis Date    Arthritis     knees    Cataract, left eye Both eyes  Had surgery on left   Difficulty swallowing     Dizziness     Gastric reflux     Hypertension     Sore throat        Past Surgical History:   Procedure Laterality Date    HYSTERECTOMY      MS DILATE ESOPHAGUS N/A 4/6/2016    Procedure: DILATATION ESOPHAGEAL;  Surgeon: Melissa Justin MD;  Location: [facility]; Service: Gastroenterology    MS EGD TRANSORAL BIOPSY SINGLE/MULTIPLE N/A 4/6/2016    Procedure: ESOPHAGOGASTRODUODENOSCOPY (EGD); Surgeon: Melissa Justin MD;  Location: [facility]; Service: Gastroenterology     [facility] CATARACT EXTRACAP,INSERT LENS Left 3/15/2016    Procedure: EXTRACTION EXTRACAPSULAR CATARACT PHACO INTRAOCULAR LENS (IOL); Surgeon: Raffaele Marroquin MD;  Location:  MAIN OR;  Service: Ophthalmology    TONSILLECTOMY         Family History   Problem Relation Age of Onset    Heart disease Father        Social History     Occupational History    Not on file  Social History Main Topics    Smoking status: Never Smoker    Smokeless tobacco: Never Used    Alcohol use No    Drug use: No    Sexual activity: No         Current Outpatient Prescriptions:     albuterol (PROVENTIL HFA,VENTOLIN HFA) 90 mcg/act inhaler, Inhale 2 puffs every 6 (six) hours as needed for wheezing, Disp: , Rfl:     ALPRAZolam (XANAX) 0 25 mg tablet, Take 1 tablet by mouth 3 (three) times a day as needed, Disp: , Rfl:     amLODIPine (NORVASC) 10 mg tablet, Take 1 tablet by mouth daily, Disp: , Rfl:     Ascorbic Acid (VITAMIN C) 1000 MG tablet, Take 1 tablet by mouth daily, Disp: , Rfl:     B Complex-C (B-COMPLEX WITH VITAMIN C) tablet, Take 1 tablet by mouth daily  , Disp: , Rfl:     Cholecalciferol (VITAMIN D3) 1000 units CAPS, Take 1 tablet by mouth daily, Disp: , Rfl:     metoprolol tartrate (LOPRESSOR) 50 mg tablet, TAKE 1 TABLET BY MOUTH EVERY 12 HOURS, Disp: 180 tablet, Rfl: 1    Allergies   Allergen Reactions    Alsen      Unknown    Cortisone Acetate [Cortisone] Itching    Nickel Skin discoloration    Penicillins Hives    Sulfa Antibiotics Itching       Physical Exam:    /72   Pulse 70   Temp 98 8 °F (37 1 °C) (Oral)   Ht 5' 5" (1 651 m)   Wt 113 kg (250 lb)   BMI 41 60 kg/m²     Constitutional:normal, well developed, well nourished, alert, in no distress and non-toxic and no overt pain behavior  and obese  Eyes:anicteric  HEENT:grossly intact  Neck:supple, symmetric, trachea midline and no masses   Pulmonary:even and unlabored  Cardiovascular:No edema or pitting edema present  Skin:Normal without rashes or lesions and well hydrated  Psychiatric:Mood and affect appropriate  Neurologic:Cranial Nerves II-XII grossly intact  Musculoskeletal:Ambulates with a walker    Lumbar Spine Exam    Appearance:  Normal lordosis  Palpation/Tenderness:  left lumbar paraspinal tenderness  right lumbar paraspinal tenderness  Sensory:  no sensory deficits noted  Range of Motion:  Flexion:  Minimally limited  without pain  Extension:  Minimally limited  without pain  Lateral Flexion - Left:  Minimally limited  without pain  Lateral Flexion - Right:  Minimally limited  without pain  Rotation - Left:  Minimally limited  without pain  Rotation - Right:  Minimally limited  without pain  Motor Strength:  Left hip flexion:  5/5  Right hip flexion:  5/5  Left knee extension:  5/5  Right knee extension:  5/5  Left foot dorsiflexion:  5/5  Left foot plantar flexion:  5/5  Right foot dorsiflexion:  5/5  Right foot plantar flexion:  5/5      Imaging  MRI LUMBAR SPINE WITHOUT CONTRAST     INDICATION:  Worsening low back pain  Unsteady gait      COMPARISON:  None      TECHNIQUE:  Sagittal T1, sagittal T2, sagittal inversion recovery, axial T1 and axial T2, coronal T2        IMAGE QUALITY:  Diagnostic     FINDINGS:     ALIGNMENT:  Smooth dextroscoliosis of the lumbar spine centered at L2  Slight anterior spondylolisthesis of L2 upon L3 and L4 upon L5, grade 1  There is no compression fracture    Endplate degenerative changes are noted throughout the lumbar endplates   as well as within the T12 inferior endplate  There are multiple Schmorl's nodes present extending into the endplates many of which demonstrate mild adjacent, localized marrow edema      MARROW SIGNAL:  Fatty type II endplate marrow degenerative change at L2-3  There is type I marrow edema within the L4-5 endplates  As described above there is mild edema adjacent to multiple Schmorl's nodes within the thoracic and lumbar endplates      DISTAL CORD AND CONUS:  Normal size and signal within the distal cord and conus  The conus ends at the L1 level      PARASPINAL SOFT TISSUES:  Paraspinal soft tissues are unremarkable      SACRUM:  Normal signal within the sacrum  No evidence of insufficiency or stress fracture      LOWER THORACIC DISC SPACES:  Minor lower thoracic degenerative change without disc herniation, canal stenosis or foraminal narrowing      LUMBAR DISC SPACES:          L1-L2:  Disc desiccation  Mild annular bulging diffusely  Facet degenerative change and ligamentum flavum thickening  Mild to moderate canal stenosis and bilateral foraminal narrowing      L2-L3:  Near obliteration of the disc space  Annular bulging  Endplate hypertrophic osteophyte formation most pronounced within the far lateral aspect of the endplates on the left  Moderate left greater than right facet arthropathy with ligamentum   flavum thickening  Moderate canal stenosis with distortion of the thecal sac, especially the left lateral aspect of the thecal sac  There is moderately severe left and mild right foraminal narrowing      L3-L4:  Mild diffuse annular bulging  Small broad-based left foraminal disc protrusion  Mild facet degenerative change and ligamentum flavum thickening  Mild to moderate canal stenosis    Moderate left and mild right foraminal narrowing      L4-L5:  Moderate diffuse annular bulging with small broad-based right foraminal and lateral disc protrusion  Moderate facet hypertrophic degenerative change and ligamentum flavum thickening  Moderate canal stenosis with distortion of the thecal sac  Mild left and moderate right foraminal narrowing      L5-S1:  Mild annular bulging  Moderate facet degenerative change  No disc herniation or canal stenosis  No foraminal narrowing      IMPRESSION:     Multilevel thoracolumbar spondylitic degenerative change with annular bulging, disc protrusions and endplate/facet hypertrophic degenerative change  Moderate canal stenosis most pronounced at L2-3, L3-4 and L4-5  Multilevel mild to moderate foraminal   narrowing most pronounced at L2-3 and L3-4 on the left as well as L4-5 on the right    Correlate for corresponding radiculopathy           No orders to display         Orders Placed This Encounter   Procedures    Ambulatory referral to Physical Therapy Neuro

## 2018-05-23 PROCEDURE — 84100 ASSAY OF PHOSPHORUS: CPT

## 2018-05-23 PROCEDURE — 84145 PROCALCITONIN (PCT): CPT

## 2018-05-23 PROCEDURE — 80307 DRUG TEST PRSMV CHEM ANLYZR: CPT

## 2018-05-23 PROCEDURE — 95819 EEG AWAKE AND ASLEEP: CPT

## 2018-05-23 PROCEDURE — 82553 CREATINE MB FRACTION: CPT

## 2018-05-23 PROCEDURE — 95951: CPT

## 2018-05-23 PROCEDURE — 81001 URINALYSIS AUTO W/SCOPE: CPT

## 2018-05-23 PROCEDURE — 83735 ASSAY OF MAGNESIUM: CPT

## 2018-05-23 PROCEDURE — 85610 PROTHROMBIN TIME: CPT

## 2018-05-23 PROCEDURE — C9254: CPT

## 2018-05-23 PROCEDURE — 36415 COLL VENOUS BLD VENIPUNCTURE: CPT

## 2018-05-23 PROCEDURE — 80053 COMPREHEN METABOLIC PANEL: CPT

## 2018-05-23 PROCEDURE — 83605 ASSAY OF LACTIC ACID: CPT

## 2018-05-23 PROCEDURE — 70450 CT HEAD/BRAIN W/O DYE: CPT

## 2018-05-23 PROCEDURE — 87086 URINE CULTURE/COLONY COUNT: CPT

## 2018-05-23 PROCEDURE — 85027 COMPLETE CBC AUTOMATED: CPT

## 2018-05-23 PROCEDURE — 80164 ASSAY DIPROPYLACETIC ACD TOT: CPT

## 2018-05-23 PROCEDURE — 82550 ASSAY OF CK (CPK): CPT

## 2018-05-23 PROCEDURE — 87040 BLOOD CULTURE FOR BACTERIA: CPT

## 2018-05-23 PROCEDURE — 80048 BASIC METABOLIC PNL TOTAL CA: CPT

## 2018-05-23 PROCEDURE — 80076 HEPATIC FUNCTION PANEL: CPT

## 2018-05-23 PROCEDURE — 96374 THER/PROPH/DIAG INJ IV PUSH: CPT

## 2018-05-23 PROCEDURE — 99285 EMERGENCY DEPT VISIT HI MDM: CPT | Mod: 25

## 2018-05-23 PROCEDURE — 85730 THROMBOPLASTIN TIME PARTIAL: CPT

## 2018-05-23 PROCEDURE — 80074 ACUTE HEPATITIS PANEL: CPT

## 2018-05-23 PROCEDURE — 97163 PT EVAL HIGH COMPLEX 45 MIN: CPT

## 2018-05-23 PROCEDURE — 71045 X-RAY EXAM CHEST 1 VIEW: CPT

## 2018-05-23 PROCEDURE — 70553 MRI BRAIN STEM W/O & W/DYE: CPT

## 2018-05-23 PROCEDURE — 80185 ASSAY OF PHENYTOIN TOTAL: CPT

## 2018-07-06 ENCOUNTER — INPATIENT (INPATIENT)
Facility: HOSPITAL | Age: 23
LOS: 4 days | Discharge: HOME | DRG: 101 | End: 2018-07-11
Attending: HOSPITALIST | Admitting: EMERGENCY MEDICINE
Payer: COMMERCIAL

## 2018-07-06 VITALS
DIASTOLIC BLOOD PRESSURE: 100 MMHG | TEMPERATURE: 98 F | HEART RATE: 90 BPM | SYSTOLIC BLOOD PRESSURE: 148 MMHG | RESPIRATION RATE: 20 BRPM | OXYGEN SATURATION: 98 %

## 2018-07-06 LAB
ALBUMIN SERPL ELPH-MCNC: 5 G/DL — SIGNIFICANT CHANGE UP (ref 3.3–5.2)
ALP SERPL-CCNC: 57 U/L — SIGNIFICANT CHANGE UP (ref 40–120)
ALT FLD-CCNC: 25 U/L — SIGNIFICANT CHANGE UP
ANION GAP SERPL CALC-SCNC: 18 MMOL/L — HIGH (ref 5–17)
AST SERPL-CCNC: 48 U/L — HIGH
BASOPHILS # BLD AUTO: 0 K/UL — SIGNIFICANT CHANGE UP (ref 0–0.2)
BASOPHILS NFR BLD AUTO: 0.1 % — SIGNIFICANT CHANGE UP (ref 0–2)
BILIRUB SERPL-MCNC: 1 MG/DL — SIGNIFICANT CHANGE UP (ref 0.4–2)
BUN SERPL-MCNC: 18 MG/DL — SIGNIFICANT CHANGE UP (ref 8–20)
CALCIUM SERPL-MCNC: 10.1 MG/DL — SIGNIFICANT CHANGE UP (ref 8.6–10.2)
CHLORIDE SERPL-SCNC: 97 MMOL/L — LOW (ref 98–107)
CO2 SERPL-SCNC: 23 MMOL/L — SIGNIFICANT CHANGE UP (ref 22–29)
CREAT SERPL-MCNC: 1.38 MG/DL — HIGH (ref 0.5–1.3)
EOSINOPHIL # BLD AUTO: 0 K/UL — SIGNIFICANT CHANGE UP (ref 0–0.5)
EOSINOPHIL NFR BLD AUTO: 0 % — SIGNIFICANT CHANGE UP (ref 0–5)
GLUCOSE SERPL-MCNC: 79 MG/DL — SIGNIFICANT CHANGE UP (ref 70–115)
HCT VFR BLD CALC: 46.3 % — SIGNIFICANT CHANGE UP (ref 42–52)
HGB BLD-MCNC: 15.5 G/DL — SIGNIFICANT CHANGE UP (ref 14–18)
LYMPHOCYTES # BLD AUTO: 19.4 % — LOW (ref 20–55)
LYMPHOCYTES # BLD AUTO: 2.2 K/UL — SIGNIFICANT CHANGE UP (ref 1–4.8)
MAGNESIUM SERPL-MCNC: 2.9 MG/DL — HIGH (ref 1.8–2.6)
MCHC RBC-ENTMCNC: 28.2 PG — SIGNIFICANT CHANGE UP (ref 27–31)
MCHC RBC-ENTMCNC: 33.5 G/DL — SIGNIFICANT CHANGE UP (ref 32–36)
MCV RBC AUTO: 84.2 FL — SIGNIFICANT CHANGE UP (ref 80–94)
MONOCYTES # BLD AUTO: 1.3 K/UL — HIGH (ref 0–0.8)
MONOCYTES NFR BLD AUTO: 11.2 % — HIGH (ref 3–10)
NEUTROPHILS # BLD AUTO: 7.8 K/UL — SIGNIFICANT CHANGE UP (ref 1.8–8)
NEUTROPHILS NFR BLD AUTO: 69 % — SIGNIFICANT CHANGE UP (ref 37–73)
PHOSPHATE SERPL-MCNC: 4.3 MG/DL — SIGNIFICANT CHANGE UP (ref 2.4–4.7)
PLATELET # BLD AUTO: 182 K/UL — SIGNIFICANT CHANGE UP (ref 150–400)
POTASSIUM SERPL-MCNC: 4 MMOL/L — SIGNIFICANT CHANGE UP (ref 3.5–5.3)
POTASSIUM SERPL-SCNC: 4 MMOL/L — SIGNIFICANT CHANGE UP (ref 3.5–5.3)
PROT SERPL-MCNC: 8.3 G/DL — SIGNIFICANT CHANGE UP (ref 6.6–8.7)
RBC # BLD: 5.5 M/UL — SIGNIFICANT CHANGE UP (ref 4.6–6.2)
RBC # FLD: 12.1 % — SIGNIFICANT CHANGE UP (ref 11–15.6)
SODIUM SERPL-SCNC: 138 MMOL/L — SIGNIFICANT CHANGE UP (ref 135–145)
VALPROATE SERPL-MCNC: 15.7 UG/ML — LOW (ref 50–100)
WBC # BLD: 11.3 K/UL — HIGH (ref 4.8–10.8)
WBC # FLD AUTO: 11.3 K/UL — HIGH (ref 4.8–10.8)

## 2018-07-06 PROCEDURE — 99291 CRITICAL CARE FIRST HOUR: CPT

## 2018-07-06 RX ORDER — PHENOBARBITAL 60 MG
130 TABLET ORAL ONCE
Qty: 0 | Refills: 0 | Status: DISCONTINUED | OUTPATIENT
Start: 2018-07-06 | End: 2018-07-06

## 2018-07-06 RX ORDER — DEXMEDETOMIDINE HYDROCHLORIDE IN 0.9% SODIUM CHLORIDE 4 UG/ML
1.5 INJECTION INTRAVENOUS
Qty: 200 | Refills: 0 | Status: DISCONTINUED | OUTPATIENT
Start: 2018-07-06 | End: 2018-07-08

## 2018-07-06 RX ADMIN — HALOPERIDOL DECANOATE 10 MILLIGRAM(S): 100 INJECTION INTRAMUSCULAR at 23:35

## 2018-07-06 RX ADMIN — Medication 14 MILLIGRAM(S): at 23:35

## 2018-07-06 RX ADMIN — Medication 4 MILLIGRAM(S): at 23:45

## 2018-07-06 RX ADMIN — Medication 4 MILLIGRAM(S): at 23:50
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
## 2018-07-06 NOTE — ED PROVIDER NOTE - OBJECTIVE STATEMENT
24 y/o M pt BIBA with PMHx of concussion and seizure disorder presents to the ED s/p witnessed seizure today.  [facility] lasting several minutes with convulsions.  Pt is currently awake but confused. HPI limited due to patient's condition

## 2018-07-06 NOTE — ED ADULT NURSE NOTE - CHIEF COMPLAINT QUOTE
Pt BIBA s/p seizure at [employer].  Pt is currently awake and alert but unable to answer questions.   called to ambulance triage to evaluate pt.  Pt placed in yellow gown for safety.  Belongings collected, labeled and secured.
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
## 2018-07-06 NOTE — ED ADULT TRIAGE NOTE - CHIEF COMPLAINT QUOTE
Pt BIBA s/p seizure at [employer].  Pt is currently awake and alert but unable to answer questions.   called to ambulance triage to evaluate pt. Pt BIBA s/p seizure at laundromat.  Pt is currently awake and alert but unable to answer questions.   called to ambulance triage to evaluate pt.  Pt placed in yellow gown for safety.  Belongings collected, labeled and secured.

## 2018-07-07 DIAGNOSIS — F29 UNSPECIFIED PSYCHOSIS NOT DUE TO A SUBSTANCE OR KNOWN PHYSIOLOGICAL CONDITION: ICD-10-CM

## 2018-07-07 DIAGNOSIS — R56.9 UNSPECIFIED CONVULSIONS: ICD-10-CM

## 2018-07-07 DIAGNOSIS — G40.901 EPILEPSY, UNSPECIFIED, NOT INTRACTABLE, WITH STATUS EPILEPTICUS: ICD-10-CM

## 2018-07-07 LAB
AMPHET UR-MCNC: NEGATIVE — SIGNIFICANT CHANGE UP
ANION GAP SERPL CALC-SCNC: 15 MMOL/L — SIGNIFICANT CHANGE UP (ref 5–17)
ANION GAP SERPL CALC-SCNC: 15 MMOL/L — SIGNIFICANT CHANGE UP (ref 5–17)
APAP SERPL-MCNC: <7.5 UG/ML — LOW (ref 10–26)
BARBITURATES UR SCN-MCNC: POSITIVE
BENZODIAZ UR-MCNC: NEGATIVE — SIGNIFICANT CHANGE UP
BUN SERPL-MCNC: 19 MG/DL — SIGNIFICANT CHANGE UP (ref 8–20)
BUN SERPL-MCNC: 20 MG/DL — SIGNIFICANT CHANGE UP (ref 8–20)
CALCIUM SERPL-MCNC: 8.9 MG/DL — SIGNIFICANT CHANGE UP (ref 8.6–10.2)
CALCIUM SERPL-MCNC: 9 MG/DL — SIGNIFICANT CHANGE UP (ref 8.6–10.2)
CHLORIDE SERPL-SCNC: 100 MMOL/L — SIGNIFICANT CHANGE UP (ref 98–107)
CHLORIDE SERPL-SCNC: 102 MMOL/L — SIGNIFICANT CHANGE UP (ref 98–107)
CK MB CFR SERPL CALC: 19 NG/ML — HIGH (ref 0–6.7)
CK MB CFR SERPL CALC: 20.1 NG/ML — HIGH (ref 0–6.7)
CK SERPL-CCNC: 3427 U/L — HIGH (ref 30–200)
CK SERPL-CCNC: 4152 U/L — HIGH (ref 30–200)
CO2 SERPL-SCNC: 23 MMOL/L — SIGNIFICANT CHANGE UP (ref 22–29)
CO2 SERPL-SCNC: 25 MMOL/L — SIGNIFICANT CHANGE UP (ref 22–29)
COCAINE METAB.OTHER UR-MCNC: NEGATIVE — SIGNIFICANT CHANGE UP
CREAT SERPL-MCNC: 1.54 MG/DL — HIGH (ref 0.5–1.3)
CREAT SERPL-MCNC: 1.63 MG/DL — HIGH (ref 0.5–1.3)
ETHANOL SERPL-MCNC: <10 MG/DL — SIGNIFICANT CHANGE UP
GLUCOSE SERPL-MCNC: 117 MG/DL — HIGH (ref 70–115)
GLUCOSE SERPL-MCNC: 133 MG/DL — HIGH (ref 70–115)
MAGNESIUM SERPL-MCNC: 2.5 MG/DL — SIGNIFICANT CHANGE UP (ref 1.6–2.6)
METHADONE UR-MCNC: NEGATIVE — SIGNIFICANT CHANGE UP
OPIATES UR-MCNC: NEGATIVE — SIGNIFICANT CHANGE UP
PCP SPEC-MCNC: SIGNIFICANT CHANGE UP
PCP UR-MCNC: NEGATIVE — SIGNIFICANT CHANGE UP
PHENYTOIN FREE SERPL-MCNC: <2.9 UG/ML — LOW (ref 10–20)
PHOSPHATE SERPL-MCNC: 5.2 MG/DL — HIGH (ref 2.4–4.7)
POTASSIUM SERPL-MCNC: 3.7 MMOL/L — SIGNIFICANT CHANGE UP (ref 3.5–5.3)
POTASSIUM SERPL-MCNC: 3.9 MMOL/L — SIGNIFICANT CHANGE UP (ref 3.5–5.3)
POTASSIUM SERPL-SCNC: 3.7 MMOL/L — SIGNIFICANT CHANGE UP (ref 3.5–5.3)
POTASSIUM SERPL-SCNC: 3.9 MMOL/L — SIGNIFICANT CHANGE UP (ref 3.5–5.3)
SALICYLATES SERPL-MCNC: <0.6 MG/DL — LOW (ref 10–20)
SODIUM SERPL-SCNC: 140 MMOL/L — SIGNIFICANT CHANGE UP (ref 135–145)
SODIUM SERPL-SCNC: 140 MMOL/L — SIGNIFICANT CHANGE UP (ref 135–145)
THC UR QL: POSITIVE

## 2018-07-07 PROCEDURE — 70450 CT HEAD/BRAIN W/O DYE: CPT | Mod: 26

## 2018-07-07 PROCEDURE — 99291 CRITICAL CARE FIRST HOUR: CPT

## 2018-07-07 RX ORDER — SODIUM CHLORIDE 9 MG/ML
1000 INJECTION, SOLUTION INTRAVENOUS
Qty: 0 | Refills: 0 | Status: DISCONTINUED | OUTPATIENT
Start: 2018-07-07 | End: 2018-07-08

## 2018-07-07 RX ORDER — VALPROIC ACID (AS SODIUM SALT) 250 MG/5ML
500 SOLUTION, ORAL ORAL EVERY 12 HOURS
Qty: 0 | Refills: 0 | Status: DISCONTINUED | OUTPATIENT
Start: 2018-07-07 | End: 2018-07-08

## 2018-07-07 RX ORDER — PHENOBARBITAL 60 MG
230 TABLET ORAL ONCE
Qty: 0 | Refills: 0 | Status: DISCONTINUED | OUTPATIENT
Start: 2018-07-07 | End: 2018-07-07

## 2018-07-07 RX ORDER — FOSPHENYTOIN 50 MG/ML
100 INJECTION INTRAMUSCULAR; INTRAVENOUS EVERY 8 HOURS
Qty: 0 | Refills: 0 | Status: DISCONTINUED | OUTPATIENT
Start: 2018-07-07 | End: 2018-07-07

## 2018-07-07 RX ORDER — VALPROIC ACID (AS SODIUM SALT) 250 MG/5ML
250 SOLUTION, ORAL ORAL EVERY 6 HOURS
Qty: 0 | Refills: 0 | Status: DISCONTINUED | OUTPATIENT
Start: 2018-07-07 | End: 2018-07-07

## 2018-07-07 RX ORDER — FOSPHENYTOIN 50 MG/ML
200 INJECTION INTRAMUSCULAR; INTRAVENOUS EVERY 12 HOURS
Qty: 0 | Refills: 0 | Status: DISCONTINUED | OUTPATIENT
Start: 2018-07-07 | End: 2018-07-09

## 2018-07-07 RX ORDER — PHENOBARBITAL 60 MG
130 TABLET ORAL ONCE
Qty: 0 | Refills: 0 | Status: DISCONTINUED | OUTPATIENT
Start: 2018-07-07 | End: 2018-07-07

## 2018-07-07 RX ORDER — SODIUM CHLORIDE 9 MG/ML
1000 INJECTION, SOLUTION INTRAVENOUS ONCE
Qty: 0 | Refills: 0 | Status: COMPLETED | OUTPATIENT
Start: 2018-07-07 | End: 2018-07-07

## 2018-07-07 RX ORDER — HALOPERIDOL DECANOATE 100 MG/ML
10 INJECTION INTRAMUSCULAR ONCE
Qty: 0 | Refills: 0 | Status: COMPLETED | OUTPATIENT
Start: 2018-07-07 | End: 2018-07-06

## 2018-07-07 RX ORDER — ENOXAPARIN SODIUM 100 MG/ML
40 INJECTION SUBCUTANEOUS DAILY
Qty: 0 | Refills: 0 | Status: DISCONTINUED | OUTPATIENT
Start: 2018-07-07 | End: 2018-07-11

## 2018-07-07 RX ORDER — VALPROIC ACID (AS SODIUM SALT) 250 MG/5ML
1500 SOLUTION, ORAL ORAL ONCE
Qty: 0 | Refills: 0 | Status: COMPLETED | OUTPATIENT
Start: 2018-07-07 | End: 2018-07-07

## 2018-07-07 RX ORDER — LACOSAMIDE 50 MG/1
100 TABLET ORAL EVERY 12 HOURS
Qty: 0 | Refills: 0 | Status: DISCONTINUED | OUTPATIENT
Start: 2018-07-07 | End: 2018-07-07

## 2018-07-07 RX ORDER — ACETAMINOPHEN 500 MG
1000 TABLET ORAL ONCE
Qty: 0 | Refills: 0 | Status: COMPLETED | OUTPATIENT
Start: 2018-07-07 | End: 2018-07-07

## 2018-07-07 RX ORDER — PHENOBARBITAL 60 MG
130 TABLET ORAL
Qty: 0 | Refills: 0 | Status: DISCONTINUED | OUTPATIENT
Start: 2018-07-07 | End: 2018-07-09

## 2018-07-07 RX ORDER — FOSPHENYTOIN 50 MG/ML
1000 INJECTION INTRAMUSCULAR; INTRAVENOUS ONCE
Qty: 0 | Refills: 0 | Status: COMPLETED | OUTPATIENT
Start: 2018-07-07 | End: 2018-07-07

## 2018-07-07 RX ORDER — LACOSAMIDE 50 MG/1
150 TABLET ORAL EVERY 12 HOURS
Qty: 0 | Refills: 0 | Status: DISCONTINUED | OUTPATIENT
Start: 2018-07-07 | End: 2018-07-08

## 2018-07-07 RX ADMIN — Medication 130 MILLIGRAM(S): at 00:53

## 2018-07-07 RX ADMIN — LACOSAMIDE 120 MILLIGRAM(S): 50 TABLET ORAL at 02:24

## 2018-07-07 RX ADMIN — Medication 27.5 MILLIGRAM(S): at 21:32

## 2018-07-07 RX ADMIN — Medication 27.5 MILLIGRAM(S): at 09:09

## 2018-07-07 RX ADMIN — SODIUM CHLORIDE 2000 MILLILITER(S): 9 INJECTION, SOLUTION INTRAVENOUS at 14:11

## 2018-07-07 RX ADMIN — SODIUM CHLORIDE 4000 MILLILITER(S): 9 INJECTION, SOLUTION INTRAVENOUS at 08:57

## 2018-07-07 RX ADMIN — DEXMEDETOMIDINE HYDROCHLORIDE IN 0.9% SODIUM CHLORIDE 41.25 MICROGRAM(S)/KG/HR: 4 INJECTION INTRAVENOUS at 08:56

## 2018-07-07 RX ADMIN — FOSPHENYTOIN 140 MILLIGRAM(S) PE: 50 INJECTION INTRAMUSCULAR; INTRAVENOUS at 09:25

## 2018-07-07 RX ADMIN — Medication 400 MILLIGRAM(S): at 21:32

## 2018-07-07 RX ADMIN — DEXMEDETOMIDINE HYDROCHLORIDE IN 0.9% SODIUM CHLORIDE 41.25 MICROGRAM(S)/KG/HR: 4 INJECTION INTRAVENOUS at 05:55

## 2018-07-07 RX ADMIN — FOSPHENYTOIN 104 MILLIGRAM(S) PE: 50 INJECTION INTRAMUSCULAR; INTRAVENOUS at 05:55

## 2018-07-07 RX ADMIN — Medication 32.5 MILLIGRAM(S): at 02:24

## 2018-07-07 RX ADMIN — LACOSAMIDE 130 MILLIGRAM(S): 50 TABLET ORAL at 17:30

## 2018-07-07 RX ADMIN — DEXMEDETOMIDINE HYDROCHLORIDE IN 0.9% SODIUM CHLORIDE 41.25 MICROGRAM(S)/KG/HR: 4 INJECTION INTRAVENOUS at 03:02

## 2018-07-07 RX ADMIN — DEXMEDETOMIDINE HYDROCHLORIDE IN 0.9% SODIUM CHLORIDE 41.25 MICROGRAM(S)/KG/HR: 4 INJECTION INTRAVENOUS at 04:08

## 2018-07-07 RX ADMIN — SODIUM CHLORIDE 200 MILLILITER(S): 9 INJECTION, SOLUTION INTRAVENOUS at 08:57

## 2018-07-07 RX ADMIN — Medication 230 MILLIGRAM(S): at 00:56

## 2018-07-07 RX ADMIN — FOSPHENYTOIN 108 MILLIGRAM(S) PE: 50 INJECTION INTRAMUSCULAR; INTRAVENOUS at 17:03

## 2018-07-07 RX ADMIN — ENOXAPARIN SODIUM 40 MILLIGRAM(S): 100 INJECTION SUBCUTANEOUS at 11:18

## 2018-07-07 NOTE — PROVIDER CONTACT NOTE (EICU) - BACKGROUND
24 yo male pmhx seizure disorder and noncompliance who p/w witnessed seiuzre at the laundromat that lasted about 1 minute.  Patient admitted to MICU for further management. Required high dose meds to sedate  pt awoke and followed commands + post ictal +severe agitation  in icu with ETCO2 in place

## 2018-07-07 NOTE — CONSULT NOTE ADULT - PROBLEM SELECTOR RECOMMENDATION 9
Continue with current AED of Dilantin 200mg q12 after 1 g load.  Continue with VImpat 150mg q12.  Continue with Depacon 500mg q12.  Ativan prn.  Agree and appreciate ICU care.  Pt has a well know post-ictal psychosis from previous events.  Maintain seizure precautions.  DVT prophylaxis recommended.  D/w ICU team.  Will follow.
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
## 2018-07-07 NOTE — H&P ADULT - RS GEN PE MLT RESP DETAILS PC
airway patent/no rhonchi/breath sounds equal/good air movement/clear to auscultation bilaterally/respirations non-labored/no rales/no wheezes
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
## 2018-07-07 NOTE — H&P ADULT - PROBLEM SELECTOR PLAN 2
Has been having multiple frequent seizures over the past few days likely related to medication noncompliance as levels have been subtherapeutic vs drug use. No focus of infectious etiology at present to suggest as trigger for seizure activity.  Home AED converted to IV formulation  D/w patient's neurologist Dr. Sinha, will augment meds as follows:  Dilantin 1g IVPB load followed by 200mg IVPB q12h  Depacon 500mg IVPB BID  Continue Vimpat 150mg IVPB q12h  CT head  Neuro to follow

## 2018-07-07 NOTE — H&P ADULT - NSHPSOCIALHISTORY_GEN_ALL_CORE
Holds temp jobs due to epilepsy, is frequently released due to liability  Smokes [employer]  Foster child since age 9, custody taken from mother due to drug abuse

## 2018-07-07 NOTE — H&P ADULT - ASSESSMENT
24 yo male, PMHx multiple concussions in the past from playing football, epilepsy that onset 2 years ago, who presented to ED BIBA with witnessed tonic-clonic seizure activity, post-ictal with behavioral disturbance        CRITICAL CARE TIME SPENT: 80 minutes assessing presenting problems of acute illness, which pose high probability of life threatening deterioration or end organ damage/dysfunction, as well as medical decision making including initiating plan of care, reviewing data, reviewing radiologic exams, discussing with multidisciplinary team, non-inclusive of procedures performed, discussing goals of care with patient/family
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
## 2018-07-08 LAB
ANION GAP SERPL CALC-SCNC: 17 MMOL/L — SIGNIFICANT CHANGE UP (ref 5–17)
ANION GAP SERPL CALC-SCNC: 19 MMOL/L — HIGH (ref 5–17)
APPEARANCE UR: CLEAR — SIGNIFICANT CHANGE UP
BILIRUB UR-MCNC: NEGATIVE — SIGNIFICANT CHANGE UP
BUN SERPL-MCNC: 17 MG/DL — SIGNIFICANT CHANGE UP (ref 8–20)
BUN SERPL-MCNC: 19 MG/DL — SIGNIFICANT CHANGE UP (ref 8–20)
CALCIUM SERPL-MCNC: 8.7 MG/DL — SIGNIFICANT CHANGE UP (ref 8.6–10.2)
CALCIUM SERPL-MCNC: 8.8 MG/DL — SIGNIFICANT CHANGE UP (ref 8.6–10.2)
CHLORIDE SERPL-SCNC: 97 MMOL/L — LOW (ref 98–107)
CHLORIDE SERPL-SCNC: 97 MMOL/L — LOW (ref 98–107)
CO2 SERPL-SCNC: 21 MMOL/L — LOW (ref 22–29)
CO2 SERPL-SCNC: 22 MMOL/L — SIGNIFICANT CHANGE UP (ref 22–29)
COLOR SPEC: YELLOW — SIGNIFICANT CHANGE UP
CREAT SERPL-MCNC: 1.47 MG/DL — HIGH (ref 0.5–1.3)
CREAT SERPL-MCNC: 1.64 MG/DL — HIGH (ref 0.5–1.3)
DIFF PNL FLD: ABNORMAL
GLUCOSE SERPL-MCNC: 101 MG/DL — SIGNIFICANT CHANGE UP (ref 70–115)
GLUCOSE SERPL-MCNC: 114 MG/DL — SIGNIFICANT CHANGE UP (ref 70–115)
GLUCOSE UR QL: NEGATIVE MG/DL — SIGNIFICANT CHANGE UP
HCT VFR BLD CALC: 43.6 % — SIGNIFICANT CHANGE UP (ref 42–52)
HGB BLD-MCNC: 14.4 G/DL — SIGNIFICANT CHANGE UP (ref 14–18)
INR BLD: 1.36 RATIO — HIGH (ref 0.88–1.16)
KETONES UR-MCNC: ABNORMAL
LEUKOCYTE ESTERASE UR-ACNC: NEGATIVE — SIGNIFICANT CHANGE UP
MCHC RBC-ENTMCNC: 27.7 PG — SIGNIFICANT CHANGE UP (ref 27–31)
MCHC RBC-ENTMCNC: 33 G/DL — SIGNIFICANT CHANGE UP (ref 32–36)
MCV RBC AUTO: 83.8 FL — SIGNIFICANT CHANGE UP (ref 80–94)
NITRITE UR-MCNC: NEGATIVE — SIGNIFICANT CHANGE UP
PH UR: 6.5 — SIGNIFICANT CHANGE UP (ref 5–8)
PLATELET # BLD AUTO: 166 K/UL — SIGNIFICANT CHANGE UP (ref 150–400)
POTASSIUM SERPL-MCNC: 3.6 MMOL/L — SIGNIFICANT CHANGE UP (ref 3.5–5.3)
POTASSIUM SERPL-MCNC: 3.6 MMOL/L — SIGNIFICANT CHANGE UP (ref 3.5–5.3)
POTASSIUM SERPL-SCNC: 3.6 MMOL/L — SIGNIFICANT CHANGE UP (ref 3.5–5.3)
POTASSIUM SERPL-SCNC: 3.6 MMOL/L — SIGNIFICANT CHANGE UP (ref 3.5–5.3)
PROLACTIN SERPL-MCNC: 35.4 NG/ML — HIGH (ref 4.1–18.4)
PROT UR-MCNC: 100 MG/DL
PROTHROM AB SERPL-ACNC: 15 SEC — HIGH (ref 9.8–12.7)
RBC # BLD: 5.2 M/UL — SIGNIFICANT CHANGE UP (ref 4.6–6.2)
RBC # FLD: 12.2 % — SIGNIFICANT CHANGE UP (ref 11–15.6)
RBC CASTS # UR COMP ASSIST: SIGNIFICANT CHANGE UP /HPF (ref 0–4)
SODIUM SERPL-SCNC: 136 MMOL/L — SIGNIFICANT CHANGE UP (ref 135–145)
SODIUM SERPL-SCNC: 137 MMOL/L — SIGNIFICANT CHANGE UP (ref 135–145)
SP GR SPEC: 1.01 — SIGNIFICANT CHANGE UP (ref 1.01–1.02)
UROBILINOGEN FLD QL: 4 MG/DL
WBC # BLD: 13.5 K/UL — HIGH (ref 4.8–10.8)
WBC # FLD AUTO: 13.5 K/UL — HIGH (ref 4.8–10.8)
WBC UR QL: SIGNIFICANT CHANGE UP

## 2018-07-08 PROCEDURE — 99233 SBSQ HOSP IP/OBS HIGH 50: CPT

## 2018-07-08 RX ORDER — SODIUM CHLORIDE 9 MG/ML
1000 INJECTION, SOLUTION INTRAVENOUS
Qty: 0 | Refills: 0 | Status: DISCONTINUED | OUTPATIENT
Start: 2018-07-08 | End: 2018-07-09

## 2018-07-08 RX ORDER — ACETAMINOPHEN 500 MG
1000 TABLET ORAL ONCE
Qty: 0 | Refills: 0 | Status: COMPLETED | OUTPATIENT
Start: 2018-07-08 | End: 2018-07-08

## 2018-07-08 RX ORDER — ACETAMINOPHEN 500 MG
650 TABLET ORAL EVERY 6 HOURS
Qty: 0 | Refills: 0 | Status: DISCONTINUED | OUTPATIENT
Start: 2018-07-08 | End: 2018-07-11

## 2018-07-08 RX ORDER — LABETALOL HCL 100 MG
10 TABLET ORAL ONCE
Qty: 0 | Refills: 0 | Status: COMPLETED | OUTPATIENT
Start: 2018-07-08 | End: 2018-07-08

## 2018-07-08 RX ORDER — VALPROIC ACID (AS SODIUM SALT) 250 MG/5ML
500 SOLUTION, ORAL ORAL
Qty: 0 | Refills: 0 | Status: DISCONTINUED | OUTPATIENT
Start: 2018-07-08 | End: 2018-07-11

## 2018-07-08 RX ORDER — LACOSAMIDE 50 MG/1
150 TABLET ORAL
Qty: 0 | Refills: 0 | Status: DISCONTINUED | OUTPATIENT
Start: 2018-07-08 | End: 2018-07-11

## 2018-07-08 RX ADMIN — Medication 650 MILLIGRAM(S): at 21:55

## 2018-07-08 RX ADMIN — SODIUM CHLORIDE 100 MILLILITER(S): 9 INJECTION, SOLUTION INTRAVENOUS at 17:50

## 2018-07-08 RX ADMIN — Medication 500 MILLIGRAM(S): at 17:50

## 2018-07-08 RX ADMIN — LACOSAMIDE 150 MILLIGRAM(S): 50 TABLET ORAL at 17:50

## 2018-07-08 RX ADMIN — Medication 400 MILLIGRAM(S): at 04:49

## 2018-07-08 RX ADMIN — LACOSAMIDE 130 MILLIGRAM(S): 50 TABLET ORAL at 05:16

## 2018-07-08 RX ADMIN — Medication 10 MILLIGRAM(S): at 10:30

## 2018-07-08 RX ADMIN — FOSPHENYTOIN 108 MILLIGRAM(S) PE: 50 INJECTION INTRAMUSCULAR; INTRAVENOUS at 05:16

## 2018-07-08 RX ADMIN — Medication 27.5 MILLIGRAM(S): at 09:05

## 2018-07-08 RX ADMIN — FOSPHENYTOIN 108 MILLIGRAM(S) PE: 50 INJECTION INTRAMUSCULAR; INTRAVENOUS at 17:50

## 2018-07-08 RX ADMIN — ENOXAPARIN SODIUM 40 MILLIGRAM(S): 100 INJECTION SUBCUTANEOUS at 11:11
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
## 2018-07-08 NOTE — CHART NOTE - NSCHARTNOTEFT_GEN_A_CORE
Initially a/w seizures and agitation requiring extensive sedation, developed rhabdomyolysis and some EVERETT, both treated with IVFs, where about he is making appropriate urine. I spoke with him about his events pre-hospitlization where he stated he as in his usual state of health, was without any concerns for infection. he is homeless and lives in a shelter. he denies any history of hypertension. His fevers overnight do not appear to be infectious- no meningeal signs, no cough, no rhinorrhea, no dysuria, no abdominal pain, mental status is appropriate-, medication induced is probable, he is hypertensive and tachycardic with likely is undiagnosed as his follow up is questionable. will trial labetalol 10mg IV x 1. at this time he has no MICU level intervetions or therapies, I have spoken to Dr. Liu about his condition and she will continue his medical plan of treatment.

## 2018-07-09 ENCOUNTER — TRANSCRIPTION ENCOUNTER (OUTPATIENT)
Age: 23
End: 2018-07-09

## 2018-07-09 LAB
ANION GAP SERPL CALC-SCNC: 16 MMOL/L — SIGNIFICANT CHANGE UP (ref 5–17)
BUN SERPL-MCNC: 12 MG/DL — SIGNIFICANT CHANGE UP (ref 8–20)
CALCIUM SERPL-MCNC: 8.8 MG/DL — SIGNIFICANT CHANGE UP (ref 8.6–10.2)
CHLORIDE SERPL-SCNC: 97 MMOL/L — LOW (ref 98–107)
CO2 SERPL-SCNC: 24 MMOL/L — SIGNIFICANT CHANGE UP (ref 22–29)
CREAT SERPL-MCNC: 1.14 MG/DL — SIGNIFICANT CHANGE UP (ref 0.5–1.3)
GLUCOSE SERPL-MCNC: 97 MG/DL — SIGNIFICANT CHANGE UP (ref 70–115)
MAGNESIUM SERPL-MCNC: 2.1 MG/DL — SIGNIFICANT CHANGE UP (ref 1.6–2.6)
PHOSPHATE SERPL-MCNC: 1.9 MG/DL — LOW (ref 2.4–4.7)
POTASSIUM SERPL-MCNC: 3.3 MMOL/L — LOW (ref 3.5–5.3)
POTASSIUM SERPL-SCNC: 3.3 MMOL/L — LOW (ref 3.5–5.3)
SODIUM SERPL-SCNC: 137 MMOL/L — SIGNIFICANT CHANGE UP (ref 135–145)

## 2018-07-09 PROCEDURE — 99233 SBSQ HOSP IP/OBS HIGH 50: CPT

## 2018-07-09 RX ORDER — POTASSIUM CHLORIDE 20 MEQ
40 PACKET (EA) ORAL ONCE
Qty: 0 | Refills: 0 | Status: COMPLETED | OUTPATIENT
Start: 2018-07-09 | End: 2018-07-09

## 2018-07-09 RX ORDER — VALPROIC ACID (AS SODIUM SALT) 250 MG/5ML
2 SOLUTION, ORAL ORAL
Qty: 120 | Refills: 0
Start: 2018-07-09 | End: 2018-08-07

## 2018-07-09 RX ORDER — POTASSIUM CHLORIDE 20 MEQ
40 PACKET (EA) ORAL EVERY 4 HOURS
Qty: 0 | Refills: 0 | Status: DISCONTINUED | OUTPATIENT
Start: 2018-07-09 | End: 2018-07-09

## 2018-07-09 RX ORDER — SODIUM,POTASSIUM PHOSPHATES 278-250MG
1 POWDER IN PACKET (EA) ORAL ONCE
Qty: 0 | Refills: 0 | Status: COMPLETED | OUTPATIENT
Start: 2018-07-09 | End: 2018-07-09

## 2018-07-09 RX ORDER — LACOSAMIDE 50 MG/1
1 TABLET ORAL
Qty: 60 | Refills: 0 | OUTPATIENT
Start: 2018-07-09 | End: 2018-08-07

## 2018-07-09 RX ORDER — LACOSAMIDE 50 MG/1
1 TABLET ORAL
Qty: 0 | Refills: 0 | COMMUNITY

## 2018-07-09 RX ORDER — LACOSAMIDE 50 MG/1
1 TABLET ORAL
Qty: 60 | Refills: 0
Start: 2018-07-09 | End: 2018-08-07

## 2018-07-09 RX ADMIN — Medication 40 MILLIEQUIVALENT(S): at 08:59

## 2018-07-09 RX ADMIN — LACOSAMIDE 150 MILLIGRAM(S): 50 TABLET ORAL at 05:53

## 2018-07-09 RX ADMIN — SODIUM CHLORIDE 100 MILLILITER(S): 9 INJECTION, SOLUTION INTRAVENOUS at 05:53

## 2018-07-09 RX ADMIN — Medication 500 MILLIGRAM(S): at 05:53

## 2018-07-09 RX ADMIN — Medication 200 MILLIGRAM(S): at 18:05

## 2018-07-09 RX ADMIN — LACOSAMIDE 150 MILLIGRAM(S): 50 TABLET ORAL at 18:05

## 2018-07-09 RX ADMIN — FOSPHENYTOIN 108 MILLIGRAM(S) PE: 50 INJECTION INTRAMUSCULAR; INTRAVENOUS at 05:53

## 2018-07-09 RX ADMIN — ENOXAPARIN SODIUM 40 MILLIGRAM(S): 100 INJECTION SUBCUTANEOUS at 12:41

## 2018-07-09 RX ADMIN — Medication 1 PACKET(S): at 08:59

## 2018-07-09 RX ADMIN — Medication 500 MILLIGRAM(S): at 18:06

## 2018-07-09 NOTE — DISCHARGE NOTE ADULT - MEDICATION SUMMARY - MEDICATIONS TO STOP TAKING
I will STOP taking the medications listed below when I get home from the hospital:  None I will STOP taking the medications listed below when I get home from the hospital:    phenytoin 100 mg oral capsule  -- 1 cap(s) by mouth 3 times a day
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
## 2018-07-09 NOTE — DISCHARGE NOTE ADULT - MEDICATION SUMMARY - MEDICATIONS TO TAKE
I will START or STAY ON the medications listed below when I get home from the hospital:    Dilantin 100 mg oral capsule, extended release  -- 2 cap(s) by mouth 2 times a day MDD:4 caps  -- Do not drink alcoholic beverages when taking this medication.  Do not take this drug if you are pregnant.  It is very important that you take or use this exactly as directed.  Do not skip doses or discontinue unless directed by your doctor.  May cause drowsiness.  Alcohol may intensify this effect.  Use care when operating dangerous machinery.  Obtain medical advice before taking any non-prescription drugs as some may affect the action of this medication.    -- Indication: For Seizure    valproic acid 250 mg oral capsule  -- 2 cap(s) by mouth 2 times a day MDD:4 cap  -- Indication: For Seizure    lacosamide 150 mg oral tablet  -- 1 tab(s) by mouth 2 times a day MDD:2 tabs  -- Caution federal law prohibits the transfer of this drug to any person other  than the person for whom it was prescribed.  It is very important that you take or use this exactly as directed.  Do not skip doses or discontinue unless directed by your doctor.  May cause drowsiness or dizziness.  Obtain medical advice before taking any non-prescription drugs as some may affect the action of this medication.  This drug may impair the ability to drive or operate machinery.  Use care until you become familiar with its effects.    -- Indication: For Seizure

## 2018-07-09 NOTE — DISCHARGE NOTE ADULT - MEDICATION SUMMARY - MEDICATIONS TO CHANGE
I will SWITCH the dose or number of times a day I take the medications listed below when I get home from the hospital:  None I will SWITCH the dose or number of times a day I take the medications listed below when I get home from the hospital:    valproic acid 250 mg oral capsule  -- 1 cap(s) by mouth 3 times a day

## 2018-07-09 NOTE — PROGRESS NOTE ADULT - SUBJECTIVE AND OBJECTIVE BOX
INTERVAL HISTORY:  Seen at bedside.  Feels better this AM.  No further events.  Comfortable.    No Known Allergies      VITAL SIGNS:  Vital Signs Last 24 Hrs  T(C): 37.4 (09 Jul 2018 08:00), Max: 38.2 (08 Jul 2018 20:00)  T(F): 99.3 (09 Jul 2018 08:00), Max: 100.7 (08 Jul 2018 20:00)  HR: 117 (09 Jul 2018 09:00) (94 - 126)  BP: 150/97 (09 Jul 2018 07:00) (150/97 - 184/101)  BP(mean): 110 (09 Jul 2018 07:00) (110 - 134)  RR: 21 (09 Jul 2018 09:00) (18 - 37)  SpO2: 99% (09 Jul 2018 09:00) (96% - 100%)    PHYSICAL EXAMINATION:  General: Well-developed, well nourished, in no acute distress.  Eyes: Conjunctiva and sclera clear.  Neurologic:  - Mental Status:  Alert, awake, oriented to person, place, and time; Speech is normal; Affect is normal.  - Cranial Nerves: II-XI intact.  - Motor:  Strength is 5/5 throughout.  There is no pronator drift.  Normal muscle bulk and tone throughout.  - Reflexes:  2+ throughout  - Sensory:  Intact to light touch, pin prick, vibration, and joint-position sense throughout.  - Coordination:  No dysmetria/dysdiadochokinesis.    MEDS:  MEDICATIONS  (STANDING):  enoxaparin Injectable 40 milliGRAM(s) SubCutaneous daily  fosphenytoin IVPB 200 milliGRAM(s) PE IV Intermittent every 12 hours  lacosamide 150 milliGRAM(s) Oral two times a day  multiple electrolytes Injection Type 1 1000 milliLiter(s) (100 mL/Hr) IV Continuous <Continuous>  valproic acid 500 milliGRAM(s) Oral two times a day    MEDICATIONS  (PRN):  acetaminophen   Tablet 650 milliGRAM(s) Oral every 6 hours PRN For Temp greater than 38 C (100.4 F)  PHENobarbital Injectable 130 milliGRAM(s) IV Push every 15 minutes PRN Severe Agitation      LABS:                          14.4   13.5  )-----------( 166      ( 08 Jul 2018 06:25 )             43.6     07-09    137  |  97<L>  |  12.0  ----------------------------<  97  3.3<L>   |  24.0  |  1.14    Ca    8.8      09 Jul 2018 06:34  Phos  1.9     07-09  Mg     2.1     07-09        IMPRESSION:  Refractory seizure dx; S/p status epilepticus

## 2018-07-09 NOTE — DISCHARGE NOTE ADULT - PATIENT PORTAL LINK FT
You can access the [facility] Patient Portal, offered by [facility], by registering with the following website: [email or web address]

## 2018-07-09 NOTE — DISCHARGE NOTE ADULT - CARE PLAN
Principal Discharge DX:	Seizure  Goal:	Please take all three seizure medications, exactly as prescribed and f/u with  closely.  Assessment and plan of treatment:	Please take all meds and f/u with neurologist,  closely.  Secondary Diagnosis:	Psychosis, unspecified psychosis type Principal Discharge DX:	Seizure  Goal:	Please take all three seizure medications, exactly as prescribed and f/u with  closely.  Assessment and plan of treatment:	Please take all meds and f/u with neurologist,  closely. NO DRIVING  Secondary Diagnosis:	Psychosis, unspecified psychosis type  Goal:	psychosis resolved for now, only becomes psychotic after seizures.
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
## 2018-07-09 NOTE — DISCHARGE NOTE ADULT - PLAN OF CARE
Please take all three seizure medications, exactly as prescribed and f/u with  closely. Please take all meds and f/u with neurologist,  closely. Please take all meds and f/u with neurologist,  closely. NO DRIVING psychosis resolved for now, only becomes psychotic after seizures.

## 2018-07-10 LAB
ANION GAP SERPL CALC-SCNC: 15 MMOL/L — SIGNIFICANT CHANGE UP (ref 5–17)
BUN SERPL-MCNC: 8 MG/DL — SIGNIFICANT CHANGE UP (ref 8–20)
CALCIUM SERPL-MCNC: 9.2 MG/DL — SIGNIFICANT CHANGE UP (ref 8.6–10.2)
CHLORIDE SERPL-SCNC: 99 MMOL/L — SIGNIFICANT CHANGE UP (ref 98–107)
CO2 SERPL-SCNC: 25 MMOL/L — SIGNIFICANT CHANGE UP (ref 22–29)
CREAT SERPL-MCNC: 0.94 MG/DL — SIGNIFICANT CHANGE UP (ref 0.5–1.3)
GLUCOSE SERPL-MCNC: 99 MG/DL — SIGNIFICANT CHANGE UP (ref 70–115)
HCT VFR BLD CALC: 41 % — LOW (ref 42–52)
HGB BLD-MCNC: 13.5 G/DL — LOW (ref 14–18)
LEVETIRACETAM SERPL-MCNC: <2 MCG/ML — LOW (ref 12–46)
MAGNESIUM SERPL-MCNC: 1.7 MG/DL — SIGNIFICANT CHANGE UP (ref 1.6–2.6)
MCHC RBC-ENTMCNC: 27.6 PG — SIGNIFICANT CHANGE UP (ref 27–31)
MCHC RBC-ENTMCNC: 32.9 G/DL — SIGNIFICANT CHANGE UP (ref 32–36)
MCV RBC AUTO: 83.8 FL — SIGNIFICANT CHANGE UP (ref 80–94)
PHOSPHATE SERPL-MCNC: 2.4 MG/DL — SIGNIFICANT CHANGE UP (ref 2.4–4.7)
PLATELET # BLD AUTO: 131 K/UL — LOW (ref 150–400)
POTASSIUM SERPL-MCNC: 3.4 MMOL/L — LOW (ref 3.5–5.3)
POTASSIUM SERPL-SCNC: 3.4 MMOL/L — LOW (ref 3.5–5.3)
RBC # BLD: 4.89 M/UL — SIGNIFICANT CHANGE UP (ref 4.6–6.2)
RBC # FLD: 12.1 % — SIGNIFICANT CHANGE UP (ref 11–15.6)
SODIUM SERPL-SCNC: 139 MMOL/L — SIGNIFICANT CHANGE UP (ref 135–145)
WBC # BLD: 10.7 K/UL — SIGNIFICANT CHANGE UP (ref 4.8–10.8)
WBC # FLD AUTO: 10.7 K/UL — SIGNIFICANT CHANGE UP (ref 4.8–10.8)

## 2018-07-10 PROCEDURE — 99232 SBSQ HOSP IP/OBS MODERATE 35: CPT

## 2018-07-10 PROCEDURE — 99222 1ST HOSP IP/OBS MODERATE 55: CPT

## 2018-07-10 RX ORDER — LANOLIN ALCOHOL/MO/W.PET/CERES
3 CREAM (GRAM) TOPICAL ONCE
Qty: 0 | Refills: 0 | Status: COMPLETED | OUTPATIENT
Start: 2018-07-10 | End: 2018-07-10

## 2018-07-10 RX ORDER — OXYCODONE AND ACETAMINOPHEN 5; 325 MG/1; MG/1
1 TABLET ORAL ONCE
Qty: 0 | Refills: 0 | Status: DISCONTINUED | OUTPATIENT
Start: 2018-07-10 | End: 2018-07-10

## 2018-07-10 RX ADMIN — OXYCODONE AND ACETAMINOPHEN 1 TABLET(S): 5; 325 TABLET ORAL at 23:39

## 2018-07-10 RX ADMIN — Medication 200 MILLIGRAM(S): at 17:52

## 2018-07-10 RX ADMIN — LACOSAMIDE 150 MILLIGRAM(S): 50 TABLET ORAL at 17:59

## 2018-07-10 RX ADMIN — Medication 3 MILLIGRAM(S): at 23:39

## 2018-07-10 RX ADMIN — Medication 200 MILLIGRAM(S): at 05:06

## 2018-07-10 RX ADMIN — LACOSAMIDE 150 MILLIGRAM(S): 50 TABLET ORAL at 05:05

## 2018-07-10 RX ADMIN — Medication 500 MILLIGRAM(S): at 17:53

## 2018-07-10 RX ADMIN — Medication 500 MILLIGRAM(S): at 05:05
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
## 2018-07-11 VITALS
RESPIRATION RATE: 18 BRPM | HEART RATE: 84 BPM | TEMPERATURE: 99 F | DIASTOLIC BLOOD PRESSURE: 82 MMHG | OXYGEN SATURATION: 98 % | SYSTOLIC BLOOD PRESSURE: 128 MMHG

## 2018-07-11 DIAGNOSIS — F05 DELIRIUM DUE TO KNOWN PHYSIOLOGICAL CONDITION: ICD-10-CM

## 2018-07-11 DIAGNOSIS — R45.1 RESTLESSNESS AND AGITATION: ICD-10-CM

## 2018-07-11 PROCEDURE — 82553 CREATINE MB FRACTION: CPT

## 2018-07-11 PROCEDURE — 94770: CPT

## 2018-07-11 PROCEDURE — 96374 THER/PROPH/DIAG INJ IV PUSH: CPT

## 2018-07-11 PROCEDURE — 80185 ASSAY OF PHENYTOIN TOTAL: CPT

## 2018-07-11 PROCEDURE — C9254: CPT

## 2018-07-11 PROCEDURE — 99285 EMERGENCY DEPT VISIT HI MDM: CPT | Mod: 25

## 2018-07-11 PROCEDURE — 80053 COMPREHEN METABOLIC PANEL: CPT

## 2018-07-11 PROCEDURE — 81001 URINALYSIS AUTO W/SCOPE: CPT

## 2018-07-11 PROCEDURE — 93005 ELECTROCARDIOGRAM TRACING: CPT

## 2018-07-11 PROCEDURE — 82550 ASSAY OF CK (CPK): CPT

## 2018-07-11 PROCEDURE — 70450 CT HEAD/BRAIN W/O DYE: CPT

## 2018-07-11 PROCEDURE — 80164 ASSAY DIPROPYLACETIC ACD TOT: CPT

## 2018-07-11 PROCEDURE — 83735 ASSAY OF MAGNESIUM: CPT

## 2018-07-11 PROCEDURE — 80177 DRUG SCRN QUAN LEVETIRACETAM: CPT

## 2018-07-11 PROCEDURE — 82962 GLUCOSE BLOOD TEST: CPT

## 2018-07-11 PROCEDURE — 99239 HOSP IP/OBS DSCHRG MGMT >30: CPT

## 2018-07-11 PROCEDURE — 80307 DRUG TEST PRSMV CHEM ANLYZR: CPT

## 2018-07-11 PROCEDURE — 84146 ASSAY OF PROLACTIN: CPT

## 2018-07-11 PROCEDURE — 96375 TX/PRO/DX INJ NEW DRUG ADDON: CPT

## 2018-07-11 PROCEDURE — 85610 PROTHROMBIN TIME: CPT

## 2018-07-11 PROCEDURE — 36415 COLL VENOUS BLD VENIPUNCTURE: CPT

## 2018-07-11 PROCEDURE — 85027 COMPLETE CBC AUTOMATED: CPT

## 2018-07-11 PROCEDURE — 84100 ASSAY OF PHOSPHORUS: CPT

## 2018-07-11 PROCEDURE — 80048 BASIC METABOLIC PNL TOTAL CA: CPT

## 2018-07-11 RX ORDER — HALOPERIDOL DECANOATE 100 MG/ML
5 INJECTION INTRAMUSCULAR ONCE
Qty: 0 | Refills: 0 | Status: COMPLETED | OUTPATIENT
Start: 2018-07-11 | End: 2018-07-11

## 2018-07-11 RX ORDER — PHENOBARBITAL 60 MG
65 TABLET ORAL ONCE
Qty: 0 | Refills: 0 | Status: DISCONTINUED | OUTPATIENT
Start: 2018-07-11 | End: 2018-07-11

## 2018-07-11 RX ORDER — DIPHENHYDRAMINE HCL 50 MG
25 CAPSULE ORAL ONCE
Qty: 0 | Refills: 0 | Status: COMPLETED | OUTPATIENT
Start: 2018-07-11 | End: 2018-07-11

## 2018-07-11 RX ADMIN — Medication 65 MILLIGRAM(S): at 04:53

## 2018-07-11 RX ADMIN — Medication 2 MILLIGRAM(S): at 04:53

## 2018-07-11 RX ADMIN — Medication 2 MILLIGRAM(S): at 05:52

## 2018-07-11 RX ADMIN — HALOPERIDOL DECANOATE 5 MILLIGRAM(S): 100 INJECTION INTRAMUSCULAR at 04:53

## 2018-07-11 RX ADMIN — LACOSAMIDE 150 MILLIGRAM(S): 50 TABLET ORAL at 11:50

## 2018-07-11 RX ADMIN — OXYCODONE AND ACETAMINOPHEN 1 TABLET(S): 5; 325 TABLET ORAL at 00:10

## 2018-07-11 RX ADMIN — Medication 25 MILLIGRAM(S): at 04:52

## 2018-07-11 RX ADMIN — Medication 500 MILLIGRAM(S): at 11:50

## 2018-07-11 RX ADMIN — Medication 200 MILLIGRAM(S): at 11:50
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
## 2018-07-11 NOTE — BEHAVIORAL HEALTH ASSESSMENT NOTE - SUMMARY
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 Consulted due to increase violent behavior, agitation, and confusion. Discussed with RN, staff, and wife about patients case, concerning his behavior and past medical problems. Pt admits to not remembering these events of aggression, feeling paranoid resulting in feeling the need  to protect self, as well as having memory impairment. Bets approach is for control of seizures not addition of anti-psychotic which can actually lower seizure threshold.

## 2018-07-11 NOTE — BEHAVIORAL HEALTH ASSESSMENT NOTE - NSBHCHARTREVIEWVS_PSY_A_CORE FT
Vital Signs Last 24 Hrs  T(C): 36.9 (11 Jul 2018 01:12), Max: 36.9 (11 Jul 2018 00:50)  T(F): 98.5 (11 Jul 2018 01:12), Max: 98.5 (11 Jul 2018 00:50)  HR: 80 (11 Jul 2018 01:12) (80 - 90)  BP: 147/81 (11 Jul 2018 01:12) (138/80 - 148/80)  RR: 18 (11 Jul 2018 01:12) (18 - 18)  SpO2: 98% (11 Jul 2018 01:12) (98% - 100%)

## 2018-07-11 NOTE — BEHAVIORAL HEALTH ASSESSMENT NOTE - NSBHCHARTREVIEWINVESTIGATE_PSY_A_CORE FT
< from: 12 Lead ECG (07.06.18 @ 21:30) >     Normal sinus rhythm  Normal ECG    < end of copied text >

## 2018-07-11 NOTE — PROGRESS NOTE ADULT - PROBLEM SELECTOR PLAN 2
-should be d/cd as expeditiously as possible  -d/c orders in, today is 3rd day  -case management is working on it

## 2018-07-11 NOTE — BEHAVIORAL HEALTH ASSESSMENT NOTE - NSBHCHARTREVIEWLAB_PSY_A_CORE FT
13.5   10.7  )-----------( 131      ( 10 Jul 2018 07:46 )             41.0     07-10    139  |  99  |  8.0  ----------------------------<  99  3.4<L>   |  25.0  |  0.94    Ca    9.2      10 Jul 2018 07:47  Phos  2.4     07-10  Mg     1.7     07-10    Valproic Acid Level, Serum: 15.7 ug/mL (07.06.18 @ 23:03)

## 2018-07-11 NOTE — BEHAVIORAL HEALTH ASSESSMENT NOTE - NSBHCONSULTMEDS_PSY_A_CORE FT
MEDICATIONS  (STANDING):  enoxaparin Injectable 40 milliGRAM(s) SubCutaneous daily  lacosamide 150 milliGRAM(s) Oral two times a day  phenytoin   Capsule 200 milliGRAM(s) Oral every 12 hours  valproic acid 500 milliGRAM(s) Oral two times a day    MEDICATIONS  (PRN):  acetaminophen   Tablet 650 milliGRAM(s) Oral every 6 hours PRN For Temp greater than 38 C (100.4 F)

## 2018-07-11 NOTE — PROGRESS NOTE ADULT - PROBLEM SELECTOR PLAN 1
Continue with current medications.  Dilantin and Depakote levels are suggested AM. Ativan for seizures.
Neuro stable.  No driving for now.  Dilantin 200mg po q12, Vimpat 150mg po q12, Depakote 500mg po q12.  DVT prophylaxis recommended.  Outpatient follow up at [facility] at [phone] or [phone] for further plan of action and treatment regimen.  D/w pt in detail who agrees with plan.  Reconsult PRN.
-cont meds  -d/c home  -neurology prescribed oral meds, I've sent them to [facility]  -social work issues pending
-cont meds  -downgrade to any bed from ICU, can be discharged home  -neurology prescribed oral meds, I've sent them to [facility]  -social work issues pending
-cont meds  -downgrade to monitored bed  -f/u with neurology
-cont seizure meds  -he follows with  very closely and there have been no further seizures
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
## 2018-07-11 NOTE — BEHAVIORAL HEALTH ASSESSMENT NOTE - NSBHCHARTREVIEWIMAGING_PSY_A_CORE FT
< from: CT Head No Cont (07.07.18 @ 10:05) >    IMPRESSION:   Unremarkable head CT.            < end of copied text >
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
## 2018-07-11 NOTE — BEHAVIORAL HEALTH ASSESSMENT NOTE - NSBHSOCIALHXDETAILSFT_PSY_A_CORE
Pt was adopted at the age of 7-8 so unsure about biological family history and adopted mother states no pmhx of medical or psychiatric illness. Wife states recent contact with biological parents and they deny any pmhx of medical or psychiatric illness

## 2018-07-11 NOTE — BEHAVIORAL HEALTH ASSESSMENT NOTE - DIFFERENTIAL
TBI with epilepsy and behavioral disturbances vs chronic traumatic encephalopathy vs Post-ictal confusion vs psychosis

## 2018-07-11 NOTE — BEHAVIORAL HEALTH ASSESSMENT NOTE - RISK ASSESSMENT
Low risk for suicide despite mood changes because of protective factors like supporting family, work, future oriented, and a good family/friend support system.
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
## 2018-07-16 LAB — K2, SPICE RESULT: NEGATIVE — SIGNIFICANT CHANGE UP

## 2018-11-28 PROBLEM — S06.0X9A CONCUSSION WITH LOSS OF CONSCIOUSNESS OF UNSPECIFIED DURATION, INITIAL ENCOUNTER: Chronic | Status: ACTIVE | Noted: 2017-06-10

## 2018-11-28 PROBLEM — R56.9 UNSPECIFIED CONVULSIONS: Chronic | Status: ACTIVE | Noted: 2017-06-10

## 2019-06-14 NOTE — BEHAVIORAL HEALTH ASSESSMENT NOTE - AFFECT QUALITY
Euthymic
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
## 2019-07-25 ENCOUNTER — EMERGENCY (EMERGENCY)
Facility: HOSPITAL | Age: 24
LOS: 1 days | Discharge: HOME | End: 2019-07-25
Attending: EMERGENCY MEDICINE
Payer: SELF-PAY

## 2019-07-25 ENCOUNTER — EMERGENCY (EMERGENCY)
Facility: HOSPITAL | Age: 24
LOS: 1 days | Discharge: HOME | End: 2019-07-25
Attending: STUDENT IN AN ORGANIZED HEALTH CARE EDUCATION/TRAINING PROGRAM
Payer: MEDICAID

## 2019-07-25 VITALS
RESPIRATION RATE: 20 BRPM | HEART RATE: 98 BPM | SYSTOLIC BLOOD PRESSURE: 164 MMHG | HEIGHT: 78 IN | OXYGEN SATURATION: 97 % | TEMPERATURE: 99 F | WEIGHT: 240.08 LBS | DIASTOLIC BLOOD PRESSURE: 104 MMHG

## 2019-07-25 VITALS
HEIGHT: 75 IN | HEART RATE: 76 BPM | DIASTOLIC BLOOD PRESSURE: 106 MMHG | TEMPERATURE: 98 F | OXYGEN SATURATION: 98 % | SYSTOLIC BLOOD PRESSURE: 147 MMHG | WEIGHT: 250 LBS | RESPIRATION RATE: 19 BRPM

## 2019-07-25 PROCEDURE — 73630 X-RAY EXAM OF FOOT: CPT | Mod: 26,50

## 2019-07-25 PROCEDURE — 99285 EMERGENCY DEPT VISIT HI MDM: CPT

## 2019-07-25 PROCEDURE — 99282 EMERGENCY DEPT VISIT SF MDM: CPT

## 2019-07-25 PROCEDURE — 99284 EMERGENCY DEPT VISIT MOD MDM: CPT

## 2019-07-25 PROCEDURE — 73562 X-RAY EXAM OF KNEE 3: CPT | Mod: 26,50

## 2019-07-25 NOTE — ED PROVIDER NOTE - NS ED ROS FT
Denies f/c/n/v/cp/sob/palpitations/ cough/rash/headache/dizziness/abd.pain/d/c/dysuria/hematuria/WEAKNESS.

## 2019-07-25 NOTE — ED ADULT TRIAGE NOTE - CHIEF COMPLAINT QUOTE
Pt with a seizure history was in court and started "seeing spots". Pt without any complaints at this time. Took all of his medication as directed this morning.

## 2019-07-25 NOTE — ED PROVIDER NOTE - CLINICAL SUMMARY MEDICAL DECISION MAKING FREE TEXT BOX
plan to hydrate, pt reprotedly had on eepisode of seizure, will check level of antiepileptics and do xray of foot and knee where pt reports pain but clincially no indicaiton of imaging, will caution with pt as pt gets very aggressive

## 2019-07-25 NOTE — ED PROVIDER NOTE - PROGRESS NOTE DETAILS
Pt signed out to me by Dr. Quinones pending hydration.  Pt with mildly elevated CPK likely 2/2 seizure.  Pt here frequently for seizures.  will d/c with outpatient f/up. instructed to return for recurrent seizure or any other concerns.

## 2019-07-25 NOTE — ED ADULT TRIAGE NOTE - CHIEF COMPLAINT QUOTE
"I had a seizure".  Pt. BIBA following unwitnessed seizure.  EMS found pt. awake and sleepy in [place].  Pt. states he was at court and then he remembers waking up after the seizure.  Complaining of bilateral knee pain.  No obvious trauma/injury noted.

## 2019-07-25 NOTE — ED PROVIDER NOTE - CLINICAL SUMMARY MEDICAL DECISION MAKING FREE TEXT BOX
pt asymptomatic well appearing; a little aggitated as does not want to be here wants to go back to court--took seizure meds did not have a seizure no aura at this time--ok to dc fit for confinment
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
## 2019-07-25 NOTE — ED PROVIDER NOTE - PHYSICAL EXAMINATION
Head: atraumatic, normacephalic  Face: atraumatic, no crepitus no orbiral/maxillary/mandibular ttp  eyes: perrla eomi  heart: rrr s1s2  lungs: ctab  abd: soft, nt nd +bs no rebound/guarding no cva ttp  skin: warm  LE: no swelling, no calf ttp  back: no midline cervical/thoracic/lumbar ttp  NEURO: cn iii-xii intact aao x 3 normal gait

## 2019-07-26 VITALS — RESPIRATION RATE: 16 BRPM

## 2019-07-26 LAB
ALBUMIN SERPL ELPH-MCNC: 4.5 G/DL — SIGNIFICANT CHANGE UP (ref 3.3–5.2)
ALP SERPL-CCNC: 39 U/L — LOW (ref 40–120)
ALT FLD-CCNC: 39 U/L — SIGNIFICANT CHANGE UP
ANION GAP SERPL CALC-SCNC: 14 MMOL/L — SIGNIFICANT CHANGE UP (ref 5–17)
AST SERPL-CCNC: 43 U/L — HIGH
BASOPHILS # BLD AUTO: 0.01 K/UL — SIGNIFICANT CHANGE UP (ref 0–0.2)
BASOPHILS NFR BLD AUTO: 0.2 % — SIGNIFICANT CHANGE UP (ref 0–2)
BILIRUB SERPL-MCNC: 1.1 MG/DL — SIGNIFICANT CHANGE UP (ref 0.4–2)
BUN SERPL-MCNC: 10 MG/DL — SIGNIFICANT CHANGE UP (ref 8–20)
CALCIUM SERPL-MCNC: 9.5 MG/DL — SIGNIFICANT CHANGE UP (ref 8.6–10.2)
CHLORIDE SERPL-SCNC: 103 MMOL/L — SIGNIFICANT CHANGE UP (ref 98–107)
CK MB CFR SERPL CALC: 15.6 NG/ML — HIGH (ref 0–6.7)
CK SERPL-CCNC: 1714 U/L — HIGH (ref 30–200)
CO2 SERPL-SCNC: 24 MMOL/L — SIGNIFICANT CHANGE UP (ref 22–29)
CREAT SERPL-MCNC: 0.93 MG/DL — SIGNIFICANT CHANGE UP (ref 0.5–1.3)
EOSINOPHIL # BLD AUTO: 0.17 K/UL — SIGNIFICANT CHANGE UP (ref 0–0.5)
EOSINOPHIL NFR BLD AUTO: 2.8 % — SIGNIFICANT CHANGE UP (ref 0–6)
GLUCOSE SERPL-MCNC: 93 MG/DL — SIGNIFICANT CHANGE UP (ref 70–115)
HCT VFR BLD CALC: 44.1 % — SIGNIFICANT CHANGE UP (ref 39–50)
HGB BLD-MCNC: 14.4 G/DL — SIGNIFICANT CHANGE UP (ref 13–17)
IMM GRANULOCYTES NFR BLD AUTO: 0.3 % — SIGNIFICANT CHANGE UP (ref 0–1.5)
LYMPHOCYTES # BLD AUTO: 2.77 K/UL — SIGNIFICANT CHANGE UP (ref 1–3.3)
LYMPHOCYTES # BLD AUTO: 46.2 % — HIGH (ref 13–44)
MCHC RBC-ENTMCNC: 28.1 PG — SIGNIFICANT CHANGE UP (ref 27–34)
MCHC RBC-ENTMCNC: 32.7 GM/DL — SIGNIFICANT CHANGE UP (ref 32–36)
MCV RBC AUTO: 86.1 FL — SIGNIFICANT CHANGE UP (ref 80–100)
MONOCYTES # BLD AUTO: 0.37 K/UL — SIGNIFICANT CHANGE UP (ref 0–0.9)
MONOCYTES NFR BLD AUTO: 6.2 % — SIGNIFICANT CHANGE UP (ref 2–14)
NEUTROPHILS # BLD AUTO: 2.65 K/UL — SIGNIFICANT CHANGE UP (ref 1.8–7.4)
NEUTROPHILS NFR BLD AUTO: 44.3 % — SIGNIFICANT CHANGE UP (ref 43–77)
PLATELET # BLD AUTO: 264 K/UL — SIGNIFICANT CHANGE UP (ref 150–400)
POTASSIUM SERPL-MCNC: 3.4 MMOL/L — LOW (ref 3.5–5.3)
POTASSIUM SERPL-SCNC: 3.4 MMOL/L — LOW (ref 3.5–5.3)
PROT SERPL-MCNC: 7.2 G/DL — SIGNIFICANT CHANGE UP (ref 6.6–8.7)
RBC # BLD: 5.12 M/UL — SIGNIFICANT CHANGE UP (ref 4.2–5.8)
RBC # FLD: 12.1 % — SIGNIFICANT CHANGE UP (ref 10.3–14.5)
SODIUM SERPL-SCNC: 141 MMOL/L — SIGNIFICANT CHANGE UP (ref 135–145)
WBC # BLD: 5.99 K/UL — SIGNIFICANT CHANGE UP (ref 3.8–10.5)
WBC # FLD AUTO: 5.99 K/UL — SIGNIFICANT CHANGE UP (ref 3.8–10.5)

## 2019-07-26 PROCEDURE — 85027 COMPLETE CBC AUTOMATED: CPT

## 2019-07-26 PROCEDURE — 82550 ASSAY OF CK (CPK): CPT

## 2019-07-26 PROCEDURE — 80307 DRUG TEST PRSMV CHEM ANLYZR: CPT

## 2019-07-26 PROCEDURE — 99284 EMERGENCY DEPT VISIT MOD MDM: CPT

## 2019-07-26 PROCEDURE — 82553 CREATINE MB FRACTION: CPT

## 2019-07-26 PROCEDURE — 80053 COMPREHEN METABOLIC PANEL: CPT

## 2019-07-26 PROCEDURE — 36415 COLL VENOUS BLD VENIPUNCTURE: CPT

## 2019-07-26 PROCEDURE — 73630 X-RAY EXAM OF FOOT: CPT

## 2019-07-26 PROCEDURE — 73562 X-RAY EXAM OF KNEE 3: CPT

## 2019-07-26 RX ORDER — LACOSAMIDE 50 MG/1
150 TABLET ORAL ONCE
Refills: 0 | Status: DISCONTINUED | OUTPATIENT
Start: 2019-07-26 | End: 2019-07-26

## 2019-07-26 RX ORDER — LACOSAMIDE 50 MG/1
1 TABLET ORAL
Qty: 60 | Refills: 0
Start: 2019-07-26

## 2019-07-26 RX ORDER — SODIUM CHLORIDE 9 MG/ML
2000 INJECTION INTRAMUSCULAR; INTRAVENOUS; SUBCUTANEOUS ONCE
Refills: 0 | Status: COMPLETED | OUTPATIENT
Start: 2019-07-26 | End: 2019-07-26

## 2019-07-26 RX ORDER — VALPROIC ACID (AS SODIUM SALT) 250 MG/5ML
2 SOLUTION, ORAL ORAL
Qty: 120 | Refills: 0
Start: 2019-07-26 | End: 2019-08-24

## 2019-07-26 RX ORDER — DIVALPROEX SODIUM 500 MG/1
500 TABLET, DELAYED RELEASE ORAL ONCE
Refills: 0 | Status: COMPLETED | OUTPATIENT
Start: 2019-07-26 | End: 2019-07-26

## 2019-07-26 RX ORDER — SODIUM CHLORIDE 9 MG/ML
1000 INJECTION INTRAMUSCULAR; INTRAVENOUS; SUBCUTANEOUS ONCE
Refills: 0 | Status: DISCONTINUED | OUTPATIENT
Start: 2019-07-26 | End: 2019-07-26

## 2019-07-26 RX ADMIN — DIVALPROEX SODIUM 500 MILLIGRAM(S): 500 TABLET, DELAYED RELEASE ORAL at 11:39

## 2019-07-26 RX ADMIN — Medication 200 MILLIGRAM(S): at 11:39

## 2019-07-26 RX ADMIN — LACOSAMIDE 150 MILLIGRAM(S): 50 TABLET ORAL at 11:39

## 2019-07-26 RX ADMIN — SODIUM CHLORIDE 1000 MILLILITER(S): 9 INJECTION INTRAMUSCULAR; INTRAVENOUS; SUBCUTANEOUS at 03:28
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
## 2019-07-26 NOTE — ED ADULT NURSE NOTE - OBJECTIVE STATEMENT
pt lying on the stretcher. pt is alert and oriented x3. pt c/o of having a seizure this afternoon. as per pt he injured his right knee and right foot. upon assessment no deformities, or injuries noted to bilateral upper and lower extremities. pt denies any n/v at this time. pt lying on the stretcher. pt is alert and oriented x3 with intermittent confusion and aggression.  pt c/o of having a seizure this afternoon. as per pt he injured his right knee and right foot. upon assessment no deformities, or injuries noted to bilateral upper and lower extremities. pt denies any n/v at this time.

## 2019-09-17 ENCOUNTER — EMERGENCY (EMERGENCY)
Facility: HOSPITAL | Age: 24
LOS: 1 days | Discharge: HOME | End: 2019-09-17
Attending: EMERGENCY MEDICINE
Payer: COMMERCIAL

## 2019-09-17 VITALS
RESPIRATION RATE: 16 BRPM | DIASTOLIC BLOOD PRESSURE: 98 MMHG | TEMPERATURE: 98 F | SYSTOLIC BLOOD PRESSURE: 138 MMHG | OXYGEN SATURATION: 99 % | HEART RATE: 94 BPM

## 2019-09-17 LAB
ALBUMIN SERPL ELPH-MCNC: 4.9 G/DL — SIGNIFICANT CHANGE UP (ref 3.3–5.2)
ALP SERPL-CCNC: 74 U/L — SIGNIFICANT CHANGE UP (ref 40–120)
ALT FLD-CCNC: 66 U/L — HIGH
ANION GAP SERPL CALC-SCNC: 18 MMOL/L — HIGH (ref 5–17)
AST SERPL-CCNC: 33 U/L — SIGNIFICANT CHANGE UP
BASOPHILS # BLD AUTO: 0.01 K/UL — SIGNIFICANT CHANGE UP (ref 0–0.2)
BASOPHILS NFR BLD AUTO: 0.1 % — SIGNIFICANT CHANGE UP (ref 0–2)
BILIRUB SERPL-MCNC: 0.3 MG/DL — LOW (ref 0.4–2)
BUN SERPL-MCNC: 15 MG/DL — SIGNIFICANT CHANGE UP (ref 8–20)
CALCIUM SERPL-MCNC: 10.1 MG/DL — SIGNIFICANT CHANGE UP (ref 8.6–10.2)
CHLORIDE SERPL-SCNC: 100 MMOL/L — SIGNIFICANT CHANGE UP (ref 98–107)
CO2 SERPL-SCNC: 22 MMOL/L — SIGNIFICANT CHANGE UP (ref 22–29)
CREAT SERPL-MCNC: 0.94 MG/DL — SIGNIFICANT CHANGE UP (ref 0.5–1.3)
EOSINOPHIL # BLD AUTO: 0.06 K/UL — SIGNIFICANT CHANGE UP (ref 0–0.5)
EOSINOPHIL NFR BLD AUTO: 0.9 % — SIGNIFICANT CHANGE UP (ref 0–6)
GLUCOSE SERPL-MCNC: 89 MG/DL — SIGNIFICANT CHANGE UP (ref 70–115)
HCT VFR BLD CALC: 49.1 % — SIGNIFICANT CHANGE UP (ref 39–50)
HGB BLD-MCNC: 15.9 G/DL — SIGNIFICANT CHANGE UP (ref 13–17)
IMM GRANULOCYTES NFR BLD AUTO: 0.3 % — SIGNIFICANT CHANGE UP (ref 0–1.5)
LYMPHOCYTES # BLD AUTO: 2.32 K/UL — SIGNIFICANT CHANGE UP (ref 1–3.3)
LYMPHOCYTES # BLD AUTO: 33.2 % — SIGNIFICANT CHANGE UP (ref 13–44)
MCHC RBC-ENTMCNC: 27.5 PG — SIGNIFICANT CHANGE UP (ref 27–34)
MCHC RBC-ENTMCNC: 32.4 GM/DL — SIGNIFICANT CHANGE UP (ref 32–36)
MCV RBC AUTO: 84.9 FL — SIGNIFICANT CHANGE UP (ref 80–100)
MONOCYTES # BLD AUTO: 0.65 K/UL — SIGNIFICANT CHANGE UP (ref 0–0.9)
MONOCYTES NFR BLD AUTO: 9.3 % — SIGNIFICANT CHANGE UP (ref 2–14)
NEUTROPHILS # BLD AUTO: 3.92 K/UL — SIGNIFICANT CHANGE UP (ref 1.8–7.4)
NEUTROPHILS NFR BLD AUTO: 56.2 % — SIGNIFICANT CHANGE UP (ref 43–77)
PLATELET # BLD AUTO: 235 K/UL — SIGNIFICANT CHANGE UP (ref 150–400)
POTASSIUM SERPL-MCNC: 4.4 MMOL/L — SIGNIFICANT CHANGE UP (ref 3.5–5.3)
POTASSIUM SERPL-SCNC: 4.4 MMOL/L — SIGNIFICANT CHANGE UP (ref 3.5–5.3)
PROT SERPL-MCNC: 8 G/DL — SIGNIFICANT CHANGE UP (ref 6.6–8.7)
RBC # BLD: 5.78 M/UL — SIGNIFICANT CHANGE UP (ref 4.2–5.8)
RBC # FLD: 12.5 % — SIGNIFICANT CHANGE UP (ref 10.3–14.5)
SODIUM SERPL-SCNC: 140 MMOL/L — SIGNIFICANT CHANGE UP (ref 135–145)
VALPROATE SERPL-MCNC: 60.7 UG/ML — SIGNIFICANT CHANGE UP (ref 50–100)
WBC # BLD: 6.98 K/UL — SIGNIFICANT CHANGE UP (ref 3.8–10.5)
WBC # FLD AUTO: 6.98 K/UL — SIGNIFICANT CHANGE UP (ref 3.8–10.5)

## 2019-09-17 PROCEDURE — 93010 ELECTROCARDIOGRAM REPORT: CPT

## 2019-09-17 PROCEDURE — 85027 COMPLETE CBC AUTOMATED: CPT

## 2019-09-17 PROCEDURE — 36415 COLL VENOUS BLD VENIPUNCTURE: CPT

## 2019-09-17 PROCEDURE — 80164 ASSAY DIPROPYLACETIC ACD TOT: CPT

## 2019-09-17 PROCEDURE — 99283 EMERGENCY DEPT VISIT LOW MDM: CPT

## 2019-09-17 PROCEDURE — 93005 ELECTROCARDIOGRAM TRACING: CPT

## 2019-09-17 PROCEDURE — 99284 EMERGENCY DEPT VISIT MOD MDM: CPT

## 2019-09-17 PROCEDURE — 80053 COMPREHEN METABOLIC PANEL: CPT

## 2019-09-17 NOTE — ED PROVIDER NOTE - PHYSICAL EXAMINATION
Gen: NAD  Head: NCAT  HEENT: PERRL, EOMI, normal conjunctiva  Lung: CTAB, no respiratory distress, no wheezing, rales, rhonchi  CV: normal s1/s2, rrr, no murmurs, Normal perfusion  Abd: soft, NTND  MSK: No edema, no visible deformities, full range of motion in all 4 extremities  Neuro: No focal neurologic deficits  Skin: No rash   Psych: normal affect
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
## 2019-09-17 NOTE — ED PROVIDER NOTE - CLINICAL SUMMARY MEDICAL DECISION MAKING FREE TEXT BOX
25yo male with h/o seizure disorder presenting with seizures after leaving from [facility] today, now awake and alert, will obtain CT head r/o ICH, labs r/o electrolyte derangement, ekg, and reassess. If WNL will dc home. Maddi Joseph DO

## 2019-09-17 NOTE — ED ADULT NURSE REASSESSMENT NOTE - NS ED NURSE REASSESS COMMENT FT1
Pt evaluated briefly by MD Ayala. Pt is requesting to leave ER at this time and is a&ox3, steady gait, no focal deficits. EKG obtained and normal pt states he will take his seizure medication when he gets home.

## 2019-09-17 NOTE — ED PROVIDER NOTE - PROGRESS NOTE DETAILS
Patient argumentative, threatening to leave, recording staff with phone and calling family members to listen in on conversation. Verbally deescalated, offered patient to leave but patient wants to stay and seek medical attention. Maddi Joseph DO Lucy: assumed pt care. the opt is awake, alert, lucid and oriented. Pt has known hx of seizures, had breakthrough seizure, feels well currently. pt is declining service at this time. No headache, normal neuro examination. Recommenced we proceed with basic labs and monitoring for further seizure activity, but the pt declines, and is stable for dc

## 2019-09-17 NOTE — ED PROVIDER NOTE - PATIENT PORTAL LINK FT
You can access the FollowMyHealth Patient Portal offered by [facility] by registering at the following website: [email or web address]. By joining [facility]’s FollowMyHealth portal, you will also be able to view your health information using other applications (apps) compatible with our system.

## 2019-09-17 NOTE — ED ADULT NURSE REASSESSMENT NOTE - NS ED NURSE REASSESS COMMENT FT1
Pt refusing bloodwork and EKG at this time due to "I will not do anything until I speak with my family". Pt chrging cellphone to be able to call family. Pt is a&ox3 s/p seizure at [facility].
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
## 2019-09-17 NOTE — ED ADULT NURSE NOTE - CAS DISC DELAYS ENTER DETAILS FT
Pt has bus ticket and transfer ticket and states he may use or he may call family to pick him up. Pt walked to [facility].

## 2019-09-17 NOTE — ED ADULT NURSE REASSESSMENT NOTE - NS ED NURSE REASSESS COMMENT FT1
Patient refusing to cooperate with staff at this time. Pt is refusing to answer questions and is recording staff on his phone when told it is against the law to record in an ER pt refuses to give his phone to staff. Security at the bedside with Dr. Joseph.

## 2019-10-11 ENCOUNTER — EMERGENCY (EMERGENCY)
Facility: HOSPITAL | Age: 24
LOS: 0 days | Discharge: HOME | End: 2019-10-11
Attending: EMERGENCY MEDICINE
Payer: MEDICAID

## 2019-10-11 VITALS
OXYGEN SATURATION: 96 % | TEMPERATURE: 98 F | DIASTOLIC BLOOD PRESSURE: 90 MMHG | SYSTOLIC BLOOD PRESSURE: 129 MMHG | HEART RATE: 96 BPM | RESPIRATION RATE: 18 BRPM

## 2019-10-11 VITALS
WEIGHT: 216.05 LBS | OXYGEN SATURATION: 96 % | SYSTOLIC BLOOD PRESSURE: 143 MMHG | HEIGHT: 78 IN | HEART RATE: 95 BPM | DIASTOLIC BLOOD PRESSURE: 98 MMHG | TEMPERATURE: 98 F | RESPIRATION RATE: 18 BRPM

## 2019-10-11 DIAGNOSIS — G40.909 EPILEPSY, UNSPECIFIED, NOT INTRACTABLE, WITHOUT STATUS EPILEPTICUS: ICD-10-CM

## 2019-10-11 LAB
ANION GAP SERPL CALC-SCNC: 7 MMOL/L — SIGNIFICANT CHANGE UP (ref 5–17)
BUN SERPL-MCNC: 12 MG/DL — SIGNIFICANT CHANGE UP (ref 7–23)
CALCIUM SERPL-MCNC: 8.5 MG/DL — SIGNIFICANT CHANGE UP (ref 8.5–10.1)
CHLORIDE SERPL-SCNC: 106 MMOL/L — SIGNIFICANT CHANGE UP (ref 96–108)
CO2 SERPL-SCNC: 25 MMOL/L — SIGNIFICANT CHANGE UP (ref 22–31)
CREAT SERPL-MCNC: 0.95 MG/DL — SIGNIFICANT CHANGE UP (ref 0.5–1.3)
GLUCOSE SERPL-MCNC: 234 MG/DL — HIGH (ref 70–99)
PHENYTOIN FREE SERPL-MCNC: 11.8 UG/ML — SIGNIFICANT CHANGE UP (ref 10–20)
POTASSIUM SERPL-MCNC: 4 MMOL/L — SIGNIFICANT CHANGE UP (ref 3.5–5.3)
POTASSIUM SERPL-SCNC: 4 MMOL/L — SIGNIFICANT CHANGE UP (ref 3.5–5.3)
SODIUM SERPL-SCNC: 138 MMOL/L — SIGNIFICANT CHANGE UP (ref 135–145)
VALPROATE SERPL-MCNC: 22 UG/ML — LOW (ref 50–100)

## 2019-10-11 PROCEDURE — 99283 EMERGENCY DEPT VISIT LOW MDM: CPT

## 2019-10-11 PROCEDURE — 80048 BASIC METABOLIC PNL TOTAL CA: CPT

## 2019-10-11 PROCEDURE — 36415 COLL VENOUS BLD VENIPUNCTURE: CPT

## 2019-10-11 PROCEDURE — 80185 ASSAY OF PHENYTOIN TOTAL: CPT

## 2019-10-11 PROCEDURE — 80164 ASSAY DIPROPYLACETIC ACD TOT: CPT

## 2019-10-11 PROCEDURE — 99284 EMERGENCY DEPT VISIT MOD MDM: CPT

## 2019-10-11 RX ORDER — DIVALPROEX SODIUM 500 MG/1
500 TABLET, DELAYED RELEASE ORAL ONCE
Refills: 0 | Status: COMPLETED | OUTPATIENT
Start: 2019-10-11 | End: 2019-10-11

## 2019-10-11 RX ADMIN — DIVALPROEX SODIUM 500 MILLIGRAM(S): 500 TABLET, DELAYED RELEASE ORAL at 06:25

## 2019-10-11 NOTE — ED PROVIDER NOTE - PROGRESS NOTE DETAILS
results d/w pt.  will give pt a dose of depakote and he will also take his morning pills with breakfast when he returns to [facility].  Pt advised of his elevated blood sugars.  States he has never been told his sugars were elevated, even during his extended stay at .  Does admit that he's eating 'a lot of candy' in the last couple of days.  Pt has a f/u appt with his PMD this week and will d/w his PMD the sugars.  Limited carb diet d/w pt.

## 2019-10-11 NOTE — ED ADULT NURSE REASSESSMENT NOTE - NS ED NURSE REASSESS COMMENT FT1
pt d/c as per order. VSS. pT denies pain. pt verbalized instruction and importance of taking prescribed medications as ordered. Pt called for tlc transportation by Merna . Pt awaiting tlc cab in ed waiting area. Left ed safe and comfortable w/o distress. No iv lock in place.
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
## 2019-10-11 NOTE — ED PROVIDER NOTE - NSFOLLOWUPINSTRUCTIONS_ED_ALL_ED_FT
Follow up with your Neurologist this week  Continue current medications  return to ED for any further concerns or worsening symptoms    Seizure    A seizure is abnormal electrical activity in the brain; the specific cause may or may not be found. Prior to a seizure you may experience a warning sensation (aura) that may include fear, nausea, dizziness, and visual changes such as flashing lights of spots. Common symptoms during the seizure may include an altered mental status, rhythmic jerking movements, drooling, grunting, loss of bladder or bowel control, or tongue biting. After a seizure, you may feel confused and sleepy.     Do not swim, drive, operate machinery, or engage in any risky activity during which a seizure could cause further injury to you or others. Teach friends and family what to do if you HAVE a seizure which includes laying you on the ground with your head on a cushion and turning you to the side to keep your breathing passages clear in case of vomiting.    SEEK IMMEDIATE MEDICAL CARE IF YOU HAVE ANY OF THE FOLLOWING SYMPTOMS: seizure lasting over 5 minutes, not waking up or persistent altered mental status after the seizure, or more frequent or worsening seizures.

## 2019-10-11 NOTE — ED PROVIDER NOTE - PATIENT PORTAL LINK FT
You can access the FollowMyHealth Patient Portal offered by [facility] by registering at the following website: [email or web address]. By joining [facility]’s FollowMyHealth portal, you will also be able to view your health information using other applications (apps) compatible with our system.

## 2019-10-11 NOTE — ED ADULT NURSE NOTE - OBJECTIVE STATEMENT
pt to ed bib police from homeless shelter after having seizure. Pt did not want to go to hospital but staff at shelter called police to escort pt to encourage him to get checked out. Pt states being d/c from [facility] today. Denies sob, chest pain, and any other complaints. PT vss. No fever/chills. PT states he has not taken his seizure medications.

## 2019-11-09 ENCOUNTER — EMERGENCY (EMERGENCY)
Facility: HOSPITAL | Age: 24
LOS: 0 days | Discharge: HOME | End: 2019-11-10
Attending: EMERGENCY MEDICINE
Payer: MEDICAID

## 2019-11-09 VITALS — WEIGHT: 259.93 LBS | HEIGHT: 78 IN

## 2019-11-09 VITALS
HEART RATE: 72 BPM | RESPIRATION RATE: 19 BRPM | OXYGEN SATURATION: 98 % | TEMPERATURE: 98 F | DIASTOLIC BLOOD PRESSURE: 87 MMHG | SYSTOLIC BLOOD PRESSURE: 143 MMHG

## 2019-11-09 DIAGNOSIS — Z79.899 OTHER LONG TERM (CURRENT) DRUG THERAPY: ICD-10-CM

## 2019-11-09 DIAGNOSIS — F17.210 NICOTINE DEPENDENCE, CIGARETTES, UNCOMPLICATED: ICD-10-CM

## 2019-11-09 DIAGNOSIS — G40.909 EPILEPSY, UNSPECIFIED, NOT INTRACTABLE, WITHOUT STATUS EPILEPTICUS: ICD-10-CM

## 2019-11-09 DIAGNOSIS — F12.99 CANNABIS USE, UNSPECIFIED WITH UNSPECIFIED CANNABIS-INDUCED DISORDER: ICD-10-CM

## 2019-11-09 LAB
ALBUMIN SERPL ELPH-MCNC: 3.6 G/DL — SIGNIFICANT CHANGE UP (ref 3.3–5)
ALP SERPL-CCNC: 58 U/L — SIGNIFICANT CHANGE UP (ref 40–120)
ALT FLD-CCNC: 40 U/L — SIGNIFICANT CHANGE UP (ref 12–78)
ANION GAP SERPL CALC-SCNC: 7 MMOL/L — SIGNIFICANT CHANGE UP (ref 5–17)
AST SERPL-CCNC: 44 U/L — HIGH (ref 15–37)
BASOPHILS # BLD AUTO: 0.01 K/UL — SIGNIFICANT CHANGE UP (ref 0–0.2)
BASOPHILS NFR BLD AUTO: 0.2 % — SIGNIFICANT CHANGE UP (ref 0–2)
BILIRUB SERPL-MCNC: 0.3 MG/DL — SIGNIFICANT CHANGE UP (ref 0.2–1.2)
BUN SERPL-MCNC: 13 MG/DL — SIGNIFICANT CHANGE UP (ref 7–23)
CALCIUM SERPL-MCNC: 9.1 MG/DL — SIGNIFICANT CHANGE UP (ref 8.5–10.1)
CHLORIDE SERPL-SCNC: 106 MMOL/L — SIGNIFICANT CHANGE UP (ref 96–108)
CO2 SERPL-SCNC: 29 MMOL/L — SIGNIFICANT CHANGE UP (ref 22–31)
CREAT SERPL-MCNC: 1.09 MG/DL — SIGNIFICANT CHANGE UP (ref 0.5–1.3)
EOSINOPHIL # BLD AUTO: 0.08 K/UL — SIGNIFICANT CHANGE UP (ref 0–0.5)
EOSINOPHIL NFR BLD AUTO: 1.9 % — SIGNIFICANT CHANGE UP (ref 0–6)
GLUCOSE SERPL-MCNC: 83 MG/DL — SIGNIFICANT CHANGE UP (ref 70–99)
HCT VFR BLD CALC: 43.5 % — SIGNIFICANT CHANGE UP (ref 39–50)
HGB BLD-MCNC: 14.1 G/DL — SIGNIFICANT CHANGE UP (ref 13–17)
IMM GRANULOCYTES NFR BLD AUTO: 0.2 % — SIGNIFICANT CHANGE UP (ref 0–1.5)
LYMPHOCYTES # BLD AUTO: 1.82 K/UL — SIGNIFICANT CHANGE UP (ref 1–3.3)
LYMPHOCYTES # BLD AUTO: 43 % — SIGNIFICANT CHANGE UP (ref 13–44)
MCHC RBC-ENTMCNC: 27.7 PG — SIGNIFICANT CHANGE UP (ref 27–34)
MCHC RBC-ENTMCNC: 32.4 GM/DL — SIGNIFICANT CHANGE UP (ref 32–36)
MCV RBC AUTO: 85.5 FL — SIGNIFICANT CHANGE UP (ref 80–100)
MONOCYTES # BLD AUTO: 0.3 K/UL — SIGNIFICANT CHANGE UP (ref 0–0.9)
MONOCYTES NFR BLD AUTO: 7.1 % — SIGNIFICANT CHANGE UP (ref 2–14)
NEUTROPHILS # BLD AUTO: 2.01 K/UL — SIGNIFICANT CHANGE UP (ref 1.8–7.4)
NEUTROPHILS NFR BLD AUTO: 47.6 % — SIGNIFICANT CHANGE UP (ref 43–77)
PLATELET # BLD AUTO: 204 K/UL — SIGNIFICANT CHANGE UP (ref 150–400)
POTASSIUM SERPL-MCNC: 3.7 MMOL/L — SIGNIFICANT CHANGE UP (ref 3.5–5.3)
POTASSIUM SERPL-SCNC: 3.7 MMOL/L — SIGNIFICANT CHANGE UP (ref 3.5–5.3)
PROT SERPL-MCNC: 7.2 GM/DL — SIGNIFICANT CHANGE UP (ref 6–8.3)
RBC # BLD: 5.09 M/UL — SIGNIFICANT CHANGE UP (ref 4.2–5.8)
RBC # FLD: 12.9 % — SIGNIFICANT CHANGE UP (ref 10.3–14.5)
SODIUM SERPL-SCNC: 142 MMOL/L — SIGNIFICANT CHANGE UP (ref 135–145)
VALPROATE SERPL-MCNC: 42 UG/ML — LOW (ref 50–100)
WBC # BLD: 4.23 K/UL — SIGNIFICANT CHANGE UP (ref 3.8–10.5)
WBC # FLD AUTO: 4.23 K/UL — SIGNIFICANT CHANGE UP (ref 3.8–10.5)

## 2019-11-09 PROCEDURE — 99284 EMERGENCY DEPT VISIT MOD MDM: CPT | Mod: 25

## 2019-11-09 PROCEDURE — 82962 GLUCOSE BLOOD TEST: CPT

## 2019-11-09 PROCEDURE — 70450 CT HEAD/BRAIN W/O DYE: CPT

## 2019-11-09 PROCEDURE — 80053 COMPREHEN METABOLIC PANEL: CPT

## 2019-11-09 PROCEDURE — 36415 COLL VENOUS BLD VENIPUNCTURE: CPT

## 2019-11-09 PROCEDURE — 80164 ASSAY DIPROPYLACETIC ACD TOT: CPT

## 2019-11-09 PROCEDURE — 99284 EMERGENCY DEPT VISIT MOD MDM: CPT

## 2019-11-09 PROCEDURE — 85025 COMPLETE CBC W/AUTO DIFF WBC: CPT

## 2019-11-09 RX ORDER — DIVALPROEX SODIUM 500 MG/1
1250 TABLET, DELAYED RELEASE ORAL ONCE
Refills: 0 | Status: COMPLETED | OUTPATIENT
Start: 2019-11-09 | End: 2019-11-09

## 2019-11-09 RX ORDER — VALPROIC ACID (AS SODIUM SALT) 250 MG/5ML
1250 SOLUTION, ORAL ORAL ONCE
Refills: 0 | Status: DISCONTINUED | OUTPATIENT
Start: 2019-11-09 | End: 2019-11-09

## 2019-11-09 RX ORDER — LACOSAMIDE 50 MG/1
150 TABLET ORAL ONCE
Refills: 0 | Status: DISCONTINUED | OUTPATIENT
Start: 2019-11-09 | End: 2019-11-09

## 2019-11-09 NOTE — ED PROVIDER NOTE - PROGRESS NOTE DETAILS
pt has his prescription bottles with him, vimpat bottle filled 10/9 and full; olanzapine bottle empty with one refill.  Depakote filled 10/20 with some left.

## 2019-11-09 NOTE — ED PROVIDER NOTE - ENMT, MLM
Airway patent, Nasal mucosa clear. Mouth with normal mucosa. Throat has no vesicles, no oropharyngeal exudates and uvula is midline.  No obvious head trauma

## 2019-11-09 NOTE — ED PROVIDER NOTE - OBJECTIVE STATEMENT
23yo male with known h/o seizure d/o on Depakote and Vimpat, as well as olanzapine c/o seizure. Patient states he had a seizure tonight, doesn't know much else except his body is "sore" and his head hurts.  No other complaints.  Pt admits to smoking cigarettes and marijuana "sometimes" but denies any other drug use and denies alcohol.  He doesn't remember his neurologist's name-on the [facility]. Pt rather vague about compliance with medications, does admit he did not take tonights doses.

## 2019-11-09 NOTE — ED ADULT TRIAGE NOTE - CHIEF COMPLAINT QUOTE
"I had seizure" patient states he has a history or seizures and forgot to take his medication today.  pt denies pain or injury.

## 2019-11-09 NOTE — ED PROVIDER NOTE - PATIENT PORTAL LINK FT
You can access the FollowMyHealth Patient Portal offered by [facility] by registering at the following website: [email or web address]. By joining [employer]’s FollowMyHealth portal, you will also be able to view your health information using other applications (apps) compatible with our system.

## 2019-11-09 NOTE — ED PROVIDER NOTE - NSFOLLOWUPINSTRUCTIONS_ED_ALL_ED_FT
Follow up with your Neurologist this week  Take your medications as prescribed  Return to ED for any further concerns  Avoid drug use, including marijuana.    Seizure    A seizure is abnormal electrical activity in the brain; the specific cause may or may not be found. Prior to a seizure you may experience a warning sensation (aura) that may include fear, nausea, dizziness, and visual changes such as flashing lights of spots. Common symptoms during the seizure may include an altered mental status, rhythmic jerking movements, drooling, grunting, loss of bladder or bowel control, or tongue biting. After a seizure, you may feel confused and sleepy.     Do not swim, drive, operate machinery, or engage in any risky activity during which a seizure could cause further injury to you or others. Teach friends and family what to do if you HAVE a seizure which includes laying you on the ground with your head on a cushion and turning you to the side to keep your breathing passages clear in case of vomiting.    SEEK IMMEDIATE MEDICAL CARE IF YOU HAVE ANY OF THE FOLLOWING SYMPTOMS: seizure lasting over 5 minutes, not waking up or persistent altered mental status after the seizure, or more frequent or worsening seizures.

## 2019-11-10 PROCEDURE — 70450 CT HEAD/BRAIN W/O DYE: CPT | Mod: 26

## 2019-11-10 RX ADMIN — DIVALPROEX SODIUM 1250 MILLIGRAM(S): 500 TABLET, DELAYED RELEASE ORAL at 00:02

## 2019-11-10 RX ADMIN — LACOSAMIDE 150 MILLIGRAM(S): 50 TABLET ORAL at 00:02

## 2019-11-10 NOTE — ED ADULT NURSE NOTE - OBJECTIVE STATEMENT
Pt presents to Ed c/o seizure. Pt reports he is unaware of how long seizure lasted but states he had an aura prior to seizure, now c/o Pt presents to Ed c/o seizure. Pt reports he is unaware of how long seizure lasted but states he had an aura prior to seizure, now c/o soreness to body and HA. Denies allergies

## 2019-11-10 NOTE — ED ADULT NURSE NOTE - NSIMPLEMENTINTERV_GEN_ALL_ED
Implemented All Universal Safety Interventions:  [place] to call system. Call bell, personal items and telephone within reach. Instruct patient to call for assistance. Room bathroom lighting operational. Non-slip footwear when patient is off stretcher. Physically safe environment: no spills, clutter or unnecessary equipment. Stretcher in lowest position, wheels locked, appropriate side rails in place.

## 2019-12-27 ENCOUNTER — EMERGENCY (EMERGENCY)
Facility: HOSPITAL | Age: 24
LOS: 0 days | Discharge: HOME | End: 2019-12-27
Attending: EMERGENCY MEDICINE
Payer: MEDICAID

## 2019-12-27 VITALS
HEART RATE: 91 BPM | RESPIRATION RATE: 16 BRPM | OXYGEN SATURATION: 97 % | TEMPERATURE: 98 F | DIASTOLIC BLOOD PRESSURE: 80 MMHG | SYSTOLIC BLOOD PRESSURE: 129 MMHG

## 2019-12-27 VITALS
SYSTOLIC BLOOD PRESSURE: 135 MMHG | RESPIRATION RATE: 18 BRPM | DIASTOLIC BLOOD PRESSURE: 70 MMHG | HEIGHT: 73 IN | HEART RATE: 100 BPM | OXYGEN SATURATION: 95 % | WEIGHT: 225.09 LBS

## 2019-12-27 DIAGNOSIS — G40.909 EPILEPSY, UNSPECIFIED, NOT INTRACTABLE, WITHOUT STATUS EPILEPTICUS: ICD-10-CM

## 2019-12-27 DIAGNOSIS — R56.9 UNSPECIFIED CONVULSIONS: ICD-10-CM

## 2019-12-27 LAB
ALBUMIN SERPL ELPH-MCNC: 4.3 G/DL — SIGNIFICANT CHANGE UP (ref 3.3–5)
ALP SERPL-CCNC: 89 U/L — SIGNIFICANT CHANGE UP (ref 40–120)
ALT FLD-CCNC: 138 U/L — HIGH (ref 12–78)
ANION GAP SERPL CALC-SCNC: 9 MMOL/L — SIGNIFICANT CHANGE UP (ref 5–17)
AST SERPL-CCNC: 36 U/L — SIGNIFICANT CHANGE UP (ref 15–37)
BILIRUB SERPL-MCNC: 0.3 MG/DL — SIGNIFICANT CHANGE UP (ref 0.2–1.2)
BUN SERPL-MCNC: 10 MG/DL — SIGNIFICANT CHANGE UP (ref 7–23)
CALCIUM SERPL-MCNC: 9.1 MG/DL — SIGNIFICANT CHANGE UP (ref 8.5–10.1)
CHLORIDE SERPL-SCNC: 103 MMOL/L — SIGNIFICANT CHANGE UP (ref 96–108)
CO2 SERPL-SCNC: 25 MMOL/L — SIGNIFICANT CHANGE UP (ref 22–31)
CREAT SERPL-MCNC: 1.18 MG/DL — SIGNIFICANT CHANGE UP (ref 0.5–1.3)
GLUCOSE SERPL-MCNC: 113 MG/DL — HIGH (ref 70–99)
HCT VFR BLD CALC: 49 % — SIGNIFICANT CHANGE UP (ref 39–50)
HGB BLD-MCNC: 16.1 G/DL — SIGNIFICANT CHANGE UP (ref 13–17)
MCHC RBC-ENTMCNC: 27.6 PG — SIGNIFICANT CHANGE UP (ref 27–34)
MCHC RBC-ENTMCNC: 32.9 GM/DL — SIGNIFICANT CHANGE UP (ref 32–36)
MCV RBC AUTO: 83.9 FL — SIGNIFICANT CHANGE UP (ref 80–100)
PLATELET # BLD AUTO: 229 K/UL — SIGNIFICANT CHANGE UP (ref 150–400)
POTASSIUM SERPL-MCNC: 3.9 MMOL/L — SIGNIFICANT CHANGE UP (ref 3.5–5.3)
POTASSIUM SERPL-SCNC: 3.9 MMOL/L — SIGNIFICANT CHANGE UP (ref 3.5–5.3)
PROT SERPL-MCNC: 8.3 GM/DL — SIGNIFICANT CHANGE UP (ref 6–8.3)
RBC # BLD: 5.84 M/UL — HIGH (ref 4.2–5.8)
RBC # FLD: 12.7 % — SIGNIFICANT CHANGE UP (ref 10.3–14.5)
SODIUM SERPL-SCNC: 137 MMOL/L — SIGNIFICANT CHANGE UP (ref 135–145)
VALPROATE SERPL-MCNC: 50 UG/ML — SIGNIFICANT CHANGE UP (ref 50–100)
WBC # BLD: 7.5 K/UL — SIGNIFICANT CHANGE UP (ref 3.8–10.5)
WBC # FLD AUTO: 7.5 K/UL — SIGNIFICANT CHANGE UP (ref 3.8–10.5)

## 2019-12-27 PROCEDURE — 80164 ASSAY DIPROPYLACETIC ACD TOT: CPT

## 2019-12-27 PROCEDURE — 85027 COMPLETE CBC AUTOMATED: CPT

## 2019-12-27 PROCEDURE — 80053 COMPREHEN METABOLIC PANEL: CPT

## 2019-12-27 PROCEDURE — 36415 COLL VENOUS BLD VENIPUNCTURE: CPT

## 2019-12-27 PROCEDURE — 99283 EMERGENCY DEPT VISIT LOW MDM: CPT

## 2019-12-27 NOTE — ED PROVIDER NOTE - NSFOLLOWUPINSTRUCTIONS_ED_ALL_ED_FT
please follow up with your doctor in 2-3 days.   continue with your medications as prescribed.    drink plenty of fluids.   return to ED for any concerns

## 2019-12-27 NOTE — ED PROVIDER NOTE - OBJECTIVE STATEMENT
23 y/o male in ED c/o sz tonight.   pt sent from [facility] after witnessed sz tonight.   pt in ED with no complaints at this time.   states h/o sz and takes his meds when he remembers.   pt denies any HA, cp, sob, n/v/d/abd pain.   no sick contacts or recent travel

## 2019-12-27 NOTE — ED ADULT NURSE REASSESSMENT NOTE - NS ED NURSE REASSESS COMMENT FT1
Pt resting comfortable in bed, AO x 3, normal breathing pattern with no difficulty. Pt updated on plan of care. DC pending

## 2019-12-27 NOTE — ED PROVIDER NOTE - PATIENT PORTAL LINK FT
You can access the FollowMyHealth Patient Portal offered by [facility] by registering at the following website: [email or web address]. By joining [employer]’s FollowMyHealth portal, you will also be able to view your health information using other applications (apps) compatible with our system.

## 2019-12-27 NOTE — ED ADULT NURSE NOTE - OBJECTIVE STATEMENT
Pt presents to ER s/p seizure. On arrival to ER pt in postictal state. Denies fall. AO x 3 oriented to baseline, normal breathing patter with no difficulty. Pt unaware if seizure was witnessed or not. Pt appears drowsy. Will continue to monitor

## 2019-12-27 NOTE — ED PROVIDER NOTE - SEVERITY
[FreeTextEntry1] : No suspicious or discrete findings are noted on clinical examination or on the prior breast imaging. The patient's lifetime breast cancer risk is elevated but does not indicate the need for additional screening with annual breast MRI. She should have a bilateral mammogram and breast sonogram in October of this year.\par \par In addition, and in light of her strong family history, a cancer genetics evaluation was advised and the importance of this was discussed. This would have obvious implications regarding her daughters.  She understands and agrees and we have noted that if this is positive, that would be an indication for additional screening with annual breast MRI at the very least. All her questions were answered and she understands and agrees. Information for the genetics evaluation appointment was provided and she will return here in 6 months or sooner as needed.\par \par All her questions were answered and she is happy with the assessment and plan. MILD

## 2019-12-30 ENCOUNTER — EMERGENCY (EMERGENCY)
Facility: HOSPITAL | Age: 24
LOS: 0 days | Discharge: HOME | End: 2019-12-30
Attending: EMERGENCY MEDICINE
Payer: MEDICAID

## 2019-12-30 VITALS
TEMPERATURE: 98 F | DIASTOLIC BLOOD PRESSURE: 97 MMHG | OXYGEN SATURATION: 100 % | RESPIRATION RATE: 16 BRPM | WEIGHT: 199.96 LBS | SYSTOLIC BLOOD PRESSURE: 169 MMHG | HEIGHT: 73 IN | HEART RATE: 70 BPM

## 2019-12-30 DIAGNOSIS — R51 HEADACHE: ICD-10-CM

## 2019-12-30 PROCEDURE — 99282 EMERGENCY DEPT VISIT SF MDM: CPT

## 2019-12-30 PROCEDURE — 70450 CT HEAD/BRAIN W/O DYE: CPT | Mod: 26

## 2019-12-30 PROCEDURE — 99284 EMERGENCY DEPT VISIT MOD MDM: CPT | Mod: 25

## 2019-12-30 PROCEDURE — 70450 CT HEAD/BRAIN W/O DYE: CPT

## 2019-12-30 RX ORDER — ACETAMINOPHEN 500 MG
975 TABLET ORAL ONCE
Refills: 0 | Status: COMPLETED | OUTPATIENT
Start: 2019-12-30 | End: 2019-12-30

## 2019-12-30 RX ADMIN — Medication 975 MILLIGRAM(S): at 23:35

## 2019-12-30 RX ADMIN — Medication 975 MILLIGRAM(S): at 22:32

## 2019-12-30 NOTE — ED ADULT NURSE NOTE - OBJECTIVE STATEMENT
Pt biba c/o headache without blurry vision. Pt alert and oriented x4 able to ambulate without assistance. Pt denies drug use. Pt denies chest pain, shortness of breath, dizziness, nausea, vomiting

## 2019-12-30 NOTE — ED ADULT TRIAGE NOTE - CHIEF COMPLAINT QUOTE
Presents from [facility] with headache, states no meds taken PTA. Falling asleep at triage, slow to respond. Denies any drug/alcohol use.

## 2019-12-30 NOTE — ED PROVIDER NOTE - OBJECTIVE STATEMENT
25 y/o male in ED c/o frontal HA today.   pt states achy pain.  pt denies any fever, change in vision, neck pain ,cp, sob, n/v/d/abd pain.   states no meds taken for pain.  tolerating PO.   no sick contacts or recent travel

## 2019-12-30 NOTE — ED PROVIDER NOTE - CLINICAL SUMMARY MEDICAL DECISION MAKING FREE TEXT BOX
pt with HA today.   sleeping in triage with no acute distress.   no meds taken for pain.   pt refusing lab work.   will CT, med and reeval.   no neurovascular deficits noted.

## 2019-12-30 NOTE — ED ADULT NURSE NOTE - CHPI ED NUR SYMPTOMS NEG
no change in level of consciousness/no vomiting/no blurred vision/no confusion/no weakness/no seizure/no syncope/no nausea/no dizziness/no loss of consciousness

## 2019-12-30 NOTE — ED PROVIDER NOTE - PATIENT PORTAL LINK FT
You can access the FollowMyHealth Patient Portal offered by [facility] by registering at the following website: [email or web address]. By joining [employer]’s FollowMyHealth portal, you will also be able to view your health information using other applications (apps) compatible with our system.

## 2019-12-30 NOTE — ED PROVIDER NOTE - NSFOLLOWUPINSTRUCTIONS_ED_ALL_ED_FT
please follow up with your doctor in 3-5 days.   take motrin and tylenol for pain.   drink plenty of fluids.  return to ED for any concerns

## 2020-02-10 ENCOUNTER — EMERGENCY (EMERGENCY)
Facility: HOSPITAL | Age: 25
LOS: 1 days | End: 2020-02-10
Admitting: EMERGENCY MEDICINE
Payer: MEDICAID

## 2020-02-10 PROCEDURE — 99284 EMERGENCY DEPT VISIT MOD MDM: CPT

## 2020-08-10 NOTE — ED ADULT NURSE NOTE - NS ED NURSE LEVEL OF CONSCIOUSNESS SPEECH
No heavy lifting/Do not lift greater than 20 pounds for 4 weeks.  Do not lift arms above shoulder height/No tub baths/No sports/gym
Speaking Coherently

## 2021-01-29 NOTE — BEHAVIORAL HEALTH ASSESSMENT NOTE - NS ED BHA MSE SPEECH SPONTANEITY
Increased Concerta to 27mg for one month, fluoxetine increased to 40mg and asked to f/u for med check in one month, virtually or in person. Alicia Bryant MD on 1/29/2021 at 8:16 AM   
Normal

## 2021-07-29 ENCOUNTER — INPATIENT (INPATIENT)
Facility: HOSPITAL | Age: 26
LOS: 3 days | Discharge: HOME | DRG: 101 | End: 2021-08-02
Attending: INTERNAL MEDICINE | Admitting: STUDENT IN AN ORGANIZED HEALTH CARE EDUCATION/TRAINING PROGRAM
Payer: COMMERCIAL

## 2021-07-29 ENCOUNTER — EMERGENCY (EMERGENCY)
Facility: HOSPITAL | Age: 26
LOS: 1 days | Discharge: HOME | End: 2021-07-29
Attending: EMERGENCY MEDICINE
Payer: COMMERCIAL

## 2021-07-29 VITALS
SYSTOLIC BLOOD PRESSURE: 124 MMHG | OXYGEN SATURATION: 98 % | HEIGHT: 73 IN | RESPIRATION RATE: 18 BRPM | TEMPERATURE: 99 F | HEART RATE: 89 BPM | WEIGHT: 259.93 LBS | DIASTOLIC BLOOD PRESSURE: 62 MMHG

## 2021-07-29 VITALS
HEIGHT: 78 IN | HEART RATE: 96 BPM | TEMPERATURE: 99 F | SYSTOLIC BLOOD PRESSURE: 166 MMHG | RESPIRATION RATE: 15 BRPM | DIASTOLIC BLOOD PRESSURE: 83 MMHG | OXYGEN SATURATION: 97 % | WEIGHT: 259.93 LBS

## 2021-07-29 DIAGNOSIS — R56.9 UNSPECIFIED CONVULSIONS: ICD-10-CM

## 2021-07-29 LAB
AMPHET UR-MCNC: NEGATIVE — SIGNIFICANT CHANGE UP
ANION GAP SERPL CALC-SCNC: 18 MMOL/L — HIGH (ref 5–17)
BARBITURATES UR SCN-MCNC: NEGATIVE — SIGNIFICANT CHANGE UP
BASOPHILS # BLD AUTO: 0.01 K/UL — SIGNIFICANT CHANGE UP (ref 0–0.2)
BASOPHILS NFR BLD AUTO: 0.1 % — SIGNIFICANT CHANGE UP (ref 0–2)
BENZODIAZ UR-MCNC: NEGATIVE — SIGNIFICANT CHANGE UP
BUN SERPL-MCNC: 10.3 MG/DL — SIGNIFICANT CHANGE UP (ref 8–20)
CALCIUM SERPL-MCNC: 9.6 MG/DL — SIGNIFICANT CHANGE UP (ref 8.6–10.2)
CHLORIDE SERPL-SCNC: 98 MMOL/L — SIGNIFICANT CHANGE UP (ref 98–107)
CO2 SERPL-SCNC: 19 MMOL/L — LOW (ref 22–29)
COCAINE METAB.OTHER UR-MCNC: NEGATIVE — SIGNIFICANT CHANGE UP
CREAT SERPL-MCNC: 0.89 MG/DL — SIGNIFICANT CHANGE UP (ref 0.5–1.3)
EOSINOPHIL # BLD AUTO: 0 K/UL — SIGNIFICANT CHANGE UP (ref 0–0.5)
EOSINOPHIL NFR BLD AUTO: 0 % — SIGNIFICANT CHANGE UP (ref 0–6)
GLUCOSE SERPL-MCNC: 74 MG/DL — SIGNIFICANT CHANGE UP (ref 70–99)
HCT VFR BLD CALC: 50.4 % — HIGH (ref 39–50)
HGB BLD-MCNC: 16.2 G/DL — SIGNIFICANT CHANGE UP (ref 13–17)
IMM GRANULOCYTES NFR BLD AUTO: 0.8 % — SIGNIFICANT CHANGE UP (ref 0–1.5)
LYMPHOCYTES # BLD AUTO: 1.45 K/UL — SIGNIFICANT CHANGE UP (ref 1–3.3)
LYMPHOCYTES # BLD AUTO: 14.7 % — SIGNIFICANT CHANGE UP (ref 13–44)
MCHC RBC-ENTMCNC: 27.4 PG — SIGNIFICANT CHANGE UP (ref 27–34)
MCHC RBC-ENTMCNC: 32.1 GM/DL — SIGNIFICANT CHANGE UP (ref 32–36)
MCV RBC AUTO: 85.1 FL — SIGNIFICANT CHANGE UP (ref 80–100)
METHADONE UR-MCNC: NEGATIVE — SIGNIFICANT CHANGE UP
MONOCYTES # BLD AUTO: 0.94 K/UL — HIGH (ref 0–0.9)
MONOCYTES NFR BLD AUTO: 9.5 % — SIGNIFICANT CHANGE UP (ref 2–14)
NEUTROPHILS # BLD AUTO: 7.39 K/UL — SIGNIFICANT CHANGE UP (ref 1.8–7.4)
NEUTROPHILS NFR BLD AUTO: 74.9 % — SIGNIFICANT CHANGE UP (ref 43–77)
OPIATES UR-MCNC: NEGATIVE — SIGNIFICANT CHANGE UP
PCP SPEC-MCNC: SIGNIFICANT CHANGE UP
PCP UR-MCNC: NEGATIVE — SIGNIFICANT CHANGE UP
PLATELET # BLD AUTO: 222 K/UL — SIGNIFICANT CHANGE UP (ref 150–400)
POTASSIUM SERPL-MCNC: 4.5 MMOL/L — SIGNIFICANT CHANGE UP (ref 3.5–5.3)
POTASSIUM SERPL-SCNC: 4.5 MMOL/L — SIGNIFICANT CHANGE UP (ref 3.5–5.3)
RBC # BLD: 5.92 M/UL — HIGH (ref 4.2–5.8)
RBC # FLD: 13.6 % — SIGNIFICANT CHANGE UP (ref 10.3–14.5)
SARS-COV-2 RNA SPEC QL NAA+PROBE: SIGNIFICANT CHANGE UP
SODIUM SERPL-SCNC: 135 MMOL/L — SIGNIFICANT CHANGE UP (ref 135–145)
THC UR QL: NEGATIVE — SIGNIFICANT CHANGE UP
VALPROATE SERPL-MCNC: 16.3 UG/ML — LOW (ref 50–100)
WBC # BLD: 9.87 K/UL — SIGNIFICANT CHANGE UP (ref 3.8–10.5)
WBC # FLD AUTO: 9.87 K/UL — SIGNIFICANT CHANGE UP (ref 3.8–10.5)

## 2021-07-29 PROCEDURE — 82962 GLUCOSE BLOOD TEST: CPT

## 2021-07-29 PROCEDURE — 99283 EMERGENCY DEPT VISIT LOW MDM: CPT

## 2021-07-29 PROCEDURE — 99285 EMERGENCY DEPT VISIT HI MDM: CPT

## 2021-07-29 PROCEDURE — 99284 EMERGENCY DEPT VISIT MOD MDM: CPT

## 2021-07-29 PROCEDURE — 99223 1ST HOSP IP/OBS HIGH 75: CPT

## 2021-07-29 RX ORDER — CLOBAZAM 10 MG/1
5 TABLET ORAL
Refills: 0 | Status: DISCONTINUED | OUTPATIENT
Start: 2021-07-29 | End: 2021-07-30

## 2021-07-29 RX ORDER — AMLODIPINE BESYLATE 2.5 MG/1
10 TABLET ORAL DAILY
Refills: 0 | Status: DISCONTINUED | OUTPATIENT
Start: 2021-07-29 | End: 2021-07-30

## 2021-07-29 RX ORDER — LACOSAMIDE 50 MG/1
150 TABLET ORAL
Refills: 0 | Status: DISCONTINUED | OUTPATIENT
Start: 2021-07-29 | End: 2021-07-30

## 2021-07-29 RX ORDER — ACETAMINOPHEN 500 MG
650 TABLET ORAL EVERY 6 HOURS
Refills: 0 | Status: DISCONTINUED | OUTPATIENT
Start: 2021-07-29 | End: 2021-08-02

## 2021-07-29 RX ORDER — LACOSAMIDE 50 MG/1
200 TABLET ORAL
Refills: 0 | Status: DISCONTINUED | OUTPATIENT
Start: 2021-07-29 | End: 2021-07-30

## 2021-07-29 RX ORDER — DIVALPROEX SODIUM 500 MG/1
500 TABLET, DELAYED RELEASE ORAL ONCE
Refills: 0 | Status: COMPLETED | OUTPATIENT
Start: 2021-07-29 | End: 2021-07-29

## 2021-07-29 RX ORDER — DIVALPROEX SODIUM 500 MG/1
1500 TABLET, DELAYED RELEASE ORAL
Refills: 0 | Status: DISCONTINUED | OUTPATIENT
Start: 2021-07-29 | End: 2021-08-02

## 2021-07-29 RX ORDER — HYDROCHLOROTHIAZIDE 25 MG
50 TABLET ORAL DAILY
Refills: 0 | Status: DISCONTINUED | OUTPATIENT
Start: 2021-07-29 | End: 2021-07-30

## 2021-07-29 RX ORDER — LANOLIN ALCOHOL/MO/W.PET/CERES
3 CREAM (GRAM) TOPICAL AT BEDTIME
Refills: 0 | Status: DISCONTINUED | OUTPATIENT
Start: 2021-07-29 | End: 2021-08-02

## 2021-07-29 RX ORDER — LACOSAMIDE 50 MG/1
200 TABLET ORAL ONCE
Refills: 0 | Status: DISCONTINUED | OUTPATIENT
Start: 2021-07-29 | End: 2021-07-29

## 2021-07-29 RX ORDER — CLOBAZAM 10 MG/1
5 TABLET ORAL ONCE
Refills: 0 | Status: DISCONTINUED | OUTPATIENT
Start: 2021-07-29 | End: 2021-07-29

## 2021-07-29 RX ORDER — ACETAMINOPHEN 500 MG
650 TABLET ORAL ONCE
Refills: 0 | Status: COMPLETED | OUTPATIENT
Start: 2021-07-29 | End: 2021-07-29

## 2021-07-29 RX ORDER — DIVALPROEX SODIUM 500 MG/1
250 TABLET, DELAYED RELEASE ORAL ONCE
Refills: 0 | Status: COMPLETED | OUTPATIENT
Start: 2021-07-29 | End: 2021-07-29

## 2021-07-29 RX ORDER — DIVALPROEX SODIUM 500 MG/1
6 TABLET, DELAYED RELEASE ORAL
Qty: 360 | Refills: 0
Start: 2021-07-29 | End: 2021-08-27

## 2021-07-29 RX ORDER — DIVALPROEX SODIUM 500 MG/1
6 TABLET, DELAYED RELEASE ORAL
Qty: 0 | Refills: 0 | DISCHARGE

## 2021-07-29 RX ADMIN — DIVALPROEX SODIUM 500 MILLIGRAM(S): 500 TABLET, DELAYED RELEASE ORAL at 13:30

## 2021-07-29 RX ADMIN — DIVALPROEX SODIUM 1500 MILLIGRAM(S): 500 TABLET, DELAYED RELEASE ORAL at 22:43

## 2021-07-29 RX ADMIN — CLOBAZAM 5 MILLIGRAM(S): 10 TABLET ORAL at 13:30

## 2021-07-29 RX ADMIN — Medication 200 MILLIGRAM(S): at 18:38

## 2021-07-29 RX ADMIN — Medication 650 MILLIGRAM(S): at 12:04

## 2021-07-29 RX ADMIN — Medication 200 MILLIGRAM(S): at 22:44

## 2021-07-29 RX ADMIN — DIVALPROEX SODIUM 250 MILLIGRAM(S): 500 TABLET, DELAYED RELEASE ORAL at 12:05

## 2021-07-29 RX ADMIN — Medication 1 MILLIGRAM(S): at 19:33

## 2021-07-29 RX ADMIN — Medication 50 MILLIGRAM(S): at 22:42

## 2021-07-29 RX ADMIN — CLOBAZAM 5 MILLIGRAM(S): 10 TABLET ORAL at 22:41

## 2021-07-29 RX ADMIN — LACOSAMIDE 200 MILLIGRAM(S): 50 TABLET ORAL at 18:38

## 2021-07-29 RX ADMIN — LACOSAMIDE 200 MILLIGRAM(S): 50 TABLET ORAL at 22:44

## 2021-07-29 RX ADMIN — DIVALPROEX SODIUM 500 MILLIGRAM(S): 500 TABLET, DELAYED RELEASE ORAL at 13:31

## 2021-07-29 RX ADMIN — DIVALPROEX SODIUM 250 MILLIGRAM(S): 500 TABLET, DELAYED RELEASE ORAL at 13:31

## 2021-07-29 NOTE — ED PROVIDER NOTE - OBJECTIVE STATEMENT
Patient is a 25yo M presenting with reported seizure at home. He states that he was sleeping and when he woke up he reports Patient is a 25yo M presenting with reported seizure at home. He states that he was sleeping and when he woke up the ambulance was there. He states people at his shelter told him he had a seizure and called 911. He states he currently feels well. Denies fevers, chills, chest pain, sob, nausea, vomiting, diarrhea

## 2021-07-29 NOTE — ED PROVIDER NOTE - PHYSICAL EXAMINATION
General: Well appearing male in no acute distress  HEENT: Normocephalic, atraumatic. Moist mucous membranes. Oropharynx clear. No lymphadenopathy.  Eyes: No scleral icterus. No conjunctival pallor. EOMI. ELOISE.  Neck: Soft and supple. Full ROM without pain. No midline tenderness  Cardiac: Regular rate and regular rhythm. No murmurs. No LE edema.  Resp: Lungs CTAB. No wheezes, rales or rhonchi.  Abd: Soft, non-tender, non-distended. No guarding or rebound. No scars, masses, or lesions.  Back: Spine midline and non-tender. No CVA tenderness.    Skin: No rashes, abrasions, or lacerations.  Neuro: AO x 3. Moves all extremities symmetrically. Motor strength and sensation grossly intact. None

## 2021-07-29 NOTE — PHARMACOTHERAPY INTERVENTION NOTE - COMMENTS
Spoke to patient at bedside to obtain medication list. States he takes depakote, vimpat, and phenytoin BID however, does not remember doses.   Called pharmacy to obtain medication list. [id number] [phone].   Per pharmacy takes: Vimpat 150mg QAM + 200mg QPM, Onfi 5mg BID, Depakote 1500mg BID, Dilantin 100mg QAM + 200mg QPM, amlodipine 10mg daily, and HCTZ 50mg daily

## 2021-07-29 NOTE — ED PROVIDER NOTE - CLINICAL SUMMARY MEDICAL DECISION MAKING FREE TEXT BOX
Patient with known history of seizures presenting with medication noncompliance leading to breakthrough seizure after recent discharge from assisted. Will give medications, evaluate, and discharge to home. Prescriptions written for patient's home seizure medication.

## 2021-07-29 NOTE — ED PROVIDER NOTE - PROGRESS NOTE DETAILS
Patient had episode of seizure, hypoxia to 51%, with staring and unresponsive to voice. Given 1mg ativan IV.

## 2021-07-29 NOTE — ED PROVIDER NOTE - PATIENT PORTAL LINK FT
You can access the FollowMyHealth Patient Portal offered by [facility] by registering at the following website: [email or web address]. By joining [employer]’s FollowMyHealth portal, you will also be able to view your health information using other applications (apps) compatible with our system.

## 2021-07-29 NOTE — ED ADULT TRIAGE NOTE - CHIEF COMPLAINT QUOTE
witnessed seizure last night and this morning. pt has seizure hx, on depakote and vimpat reports missing doses here and there. a&ox4 RR even unlabored

## 2021-07-29 NOTE — H&P ADULT - NSHPPHYSICALEXAM_GEN_ALL_CORE
Vital Signs Last 24 Hrs  T(C): 37.2 (29 Jul 2021 16:50), Max: 37.2 (29 Jul 2021 16:50)  T(F): 98.9 (29 Jul 2021 16:50), Max: 98.9 (29 Jul 2021 16:50)  HR: 96 (29 Jul 2021 16:50) (89 - 96)  BP: 166/83 (29 Jul 2021 16:50) (124/62 - 166/83)  BP(mean): --  RR: 15 (29 Jul 2021 16:50) (15 - 18)  SpO2: 97% (29 Jul 2021 16:50) (97% - 98%)    Telemedicine precludes detailed physical examination  pt appears fatigued  was sleeping and was arousable to vocal stimuli Vital Signs Last 24 Hrs  T(C): 37.2 (29 Jul 2021 16:50), Max: 37.2 (29 Jul 2021 16:50)  T(F): 98.9 (29 Jul 2021 16:50), Max: 98.9 (29 Jul 2021 16:50)  HR: 96 (29 Jul 2021 16:50) (89 - 96)  BP: 166/83 (29 Jul 2021 16:50) (124/62 - 166/83)  BP(mean): --  RR: 15 (29 Jul 2021 16:50) (15 - 18)  SpO2: 97% (29 Jul 2021 16:50) (97% - 98%)    PHYSICAL EXAM:  GENERAL: sleep but arousable, s/p sedative  HEAD:  Atraumatic, Normocephalic  EYES: EOMI, PERRLA, conjunctiva and sclera clear  oral cavity: tongue bite pat noted but no belen laceration   NECK: Supple, No JVD  CHEST/LUNG: Clear to auscultation bilaterally; No wheeze  HEART: Regular rate and rhythm; No murmurs, rubs, or gallops  ABDOMEN: Soft, Nontender, Nondistended; Bowel sounds present  EXTREMITIES:  2+ Peripheral Pulses, No clubbing, cyanosis, or edema  PSYCH: AAOx3  NEUROLOGY: non-focal  SKIN: No rashes or lesions

## 2021-07-29 NOTE — ED ADULT NURSE REASSESSMENT NOTE - NS ED NURSE REASSESS COMMENT FT1
RN heard pulse ox monitor alarming, walked in room to see patient seizing, O2 sat 51% on room air. Dr Fontanez at bedside. NRB and ativan given with good effect. Pt back at baseline. Safety maintained.

## 2021-07-29 NOTE — ED PROVIDER NOTE - ATTENDING CONTRIBUTION TO CARE
I, Aracelis Hester, have personally seen and examined this patient. I have fully participated in the care of this patient. I have reviewed all pertinent clinical information, including history, physical exam, plan and the Resident's note and agree except as noted below.     Pt with recurrent seizures,, likely med noncompliancem, 3 seizures today includnig 1 sz in ED. will admit. neuro intact

## 2021-07-29 NOTE — ED PROVIDER NOTE - PHYSICAL EXAMINATION
General: Well appearing male in no acute distress  HEENT: Tongue biting noted R side  Eyes: No scleral icterus. No conjunctival pallor. EOMI. ELOISE.  Neck: Soft and supple. Full ROM without pain.  Cardiac: Regular rate and regular rhythm. No murmurs.  Resp: Lungs CTAB. No wheezes, rales or rhonchi.  Abd: Soft, non-tender, non-distended. No guarding or rebound. Urinary incontinence noted.   Back: Spine midline and non-tender. No CVA tenderness.    Skin: No rashes, abrasions, or lacerations.  Neuro: AO x 3. Moves all extremities symmetrically. Motor strength and sensation grossly intact. CN intact

## 2021-07-29 NOTE — H&P ADULT - HISTORY OF PRESENT ILLNESS
26 year old male w Hx seizures came to ED by ambulance after witnessed sz last night and this AM     26 year old male w Hx HTN, seizures came to ED by ambulance after witnessed sz last night and this AM    known hx SZ: GTC in nature  [facility]; while in penitentiary he was taking seizure medication t   since getting home he has not been able to take his depakote  unsure of his doses of medications when he spoke to ED; reports he is taking phenytoin and vimpat  had 2 seizures witnessed by his roommate 1 last night and the other today  has no warning as SZ occur in his sleep  + bit his tongue   + urinary incontinence      In ED  /62  HR 89   RR 18   T 98.7    98% RA  acetaminophen 650 mg  divalproex  mg  +SZ w drop in O2 sat 51%  ativan 1 mg IV  phenytoin 200 mg po  lacosamide 200 mg   UDS neg  valproic acid low 16.3    PAST MEDICAL HX  Concussions from playing football   Seizures: generalized tonic clonic   HTN hypertension       PAST SURGICAL HX  denied      FAMILY HX  Unknown; pt is adopted      SOCIAL HX    smokes cigarettes but does not quantify amount

## 2021-07-29 NOTE — ED ADULT TRIAGE NOTE - CHIEF COMPLAINT QUOTE
comes to ed from group home ([address]) for seizure full body shaking was witnessed by roommate. was just discharged for same

## 2021-07-29 NOTE — ED PROVIDER NOTE - ATTENDING CONTRIBUTION TO CARE
I, Aracelis Hester, have personally seen and examined this patient. I have fully participated in the care of this patient. I have reviewed all pertinent clinical information, including history, physical exam, plan and the Resident's note and agree except as noted below.     25yo M with h/o epilepsy on vinpat, depakoate, phenytoin, was incarcerated for 2 weeks ran out of depakoate. today had gen tonic-clonic sz, typical of his seizures. last was 2 months ago which is normal for him. patient at baseline now. no complaints.     Gen: NAD, AOx3  Head: NCAT  HEENT: EOMI, oral mucosa moist, normal conjunctiva, neck supple, +tongue bite  Lung: no respiratory distress  CV:  Normal perfusion  Abd: soft, NTND  MSK: No edema, no visible deformities  Neuro: No focal neurologic deficits, CN II-XII intact, 5/5 global strength, sensation intact  Skin: No rash   Psych: normal affect     patient with seizures missed medications, neuro intact. no substance abuse. will check EKG/FS. give meds. and d/c with new prescription    150mg AM 200mg PM vinpat   onfi 5mg BID   depakoate 250mg- 6tables in AM/PM- 1500mg   dilantin 100mg AM 200mg PM     amlodipine 10mg   HCTZ 50mg

## 2021-07-29 NOTE — ED PROVIDER NOTE - CLINICAL SUMMARY MEDICAL DECISION MAKING FREE TEXT BOX
Patient presenting again with reported seizures from shelter. Labwork ordered. Patient given remainder of home medications. Will admit for further seizure workup and mgmt.

## 2021-07-29 NOTE — ED PROVIDER NOTE - OBJECTIVE STATEMENT
Patient is a 25yo M presenting with seizures from home. He states that he has a known history of seizures, reports that they are GTC in nature. He states that he just got out of [facility] 2 weeks ago. While in [facility] he was taking seizure medication but since getting home he has not been able to take his depakote. Patient is unsure of his doses of medications, reports he is taking phenytoin and vimpat, unsure of other medications. He reports that today he woke up and had 2 witnessed seizures by his mother. He reports that he bit his tongue and had urinary incontinence. He Denies fevers, chills, chest pain, sob, nausea, vomiting, diarrhea.

## 2021-07-29 NOTE — ED ADULT NURSE NOTE - OBJECTIVE STATEMENT
27 y/o m a&ox3 comes to ED c/o witnessed seizures by his mom. pt. states he had 3. pt. states he bit his tongue and peed his pants. pt. is missing Depakote. pt. just got out of shelter x2 weeks ago so has not been able to fill medication. pt. c/o headache.

## 2021-07-29 NOTE — H&P ADULT - ASSESSMENT
26 year old male w Hx HTN, seizures came to ED by ambulance after witnessed sz last night and this AM and then again in ED      #Seizures x 3 in 24 hours  was unable to take depakote  level 16.2  1. admit to med  2. sz precautions  3. s/p ativan 1 mg IV for SZ in ED  4. dilantin 100 mg in ED and BID  5. vimpat 150 mg q AM                200 mg q PM  6. depakote 1500 mg BID  7. onfi 5 mg BID  8. ativan 1 mg IV x 1 prn SZ  9. neuro      #Tongue bite  1. warm salt water gargles TID  2. ice chips prn  3. acetaminophen      #HTN  1. amlodipine 10 mg  2. HCTZ 50 mg  3. EKG      #Tobacco use  1. refused patch        #VTE  IMPROVE VTE Individual Risk Assessment    RISK                                                                Points    [  ] Previous VTE                                                  3    [  ] Thrombophilia                                               2    [  ] Lower limb paralysis                                      2        (unable to hold up >15 seconds)      [  ] Current Cancer                                              2         (within 6 months)    [  ] Immobilization > 24 hrs                                1    [  ] ICU/CCU stay > 24 hours                              1    [  ] Age > 60                                                      1    IMPROVE VTE Score __0_______    IMPROVE Score 0-1: Low Risk, No VTE prophylaxis required for most patients, encourage ambulation.   IMPROVE Score 2-3: At risk, pharmacologic VTE prophylaxis is indicated for most patients (in the absence of a contraindication)  IMPROVE Score > or = 4: High Risk, pharmacologic VTE prophylaxis is indicated for most patients (in the absence of a contraindication)        VTE document ambulation; held for poss additional SZ  med rec initially done by pharmacy  EKG      FOLLOW UP  EKG  exam         26 year old male w Hx HTN, seizures came to ED by ambulance after witnessed sz last night and this AM and then again in ED      #Seizures x 3 in 24 hours  was unable to take depakote  level 16.2  1. admit to med  2. sz precautions  3. s/p ativan 1 mg IV for SZ in ED  4. dilantin 100 mg in ED and BID  5. vimpat 150 mg q AM                200 mg q PM  6. depakote 1500 mg BID  7. onfi 5 mg BID  8. ativan 1 mg IV x 1 prn SZ  9. neuro      #Tongue bite  1. warm salt water gargles TID  2. ice chips prn  3. acetaminophen      #HTN  1. amlodipine 10 mg  2. HCTZ 50 mg  3. EKG      #Tobacco use  1. refused patch        #VTE  IMPROVE VTE Individual Risk Assessment    RISK                                                                Points    [  ] Previous VTE                                                  3    [  ] Thrombophilia                                               2    [  ] Lower limb paralysis                                      2        (unable to hold up >15 seconds)      [  ] Current Cancer                                              2         (within 6 months)    [  ] Immobilization > 24 hrs                                1    [  ] ICU/CCU stay > 24 hours                              1    [  ] Age > 60                                                      1    IMPROVE VTE Score __0_______    IMPROVE Score 0-1: Low Risk, No VTE prophylaxis required for most patients, encourage ambulation.   IMPROVE Score 2-3: At risk, pharmacologic VTE prophylaxis is indicated for most patients (in the absence of a contraindication)  IMPROVE Score > or = 4: High Risk, pharmacologic VTE prophylaxis is indicated for most patients (in the absence of a contraindication)        VTE document ambulation; held for poss additional SZ  med rec initially done by pharmacy  EKG  neuro Dr. Hills [phone] tried 21:50 and 21:59  no answer      FOLLOW UP  EKG  exam        day team to recall number in AM       26 year old male w Hx HTN, seizures came to ED by ambulance after witnessed sz last night and this AM and then again in ED      #Seizures x 3 in 24 hours  was unable to take depakote  level 16.2  1. admit to med  2. sz precautions  3. s/p ativan 1 mg IV for SZ in ED  4. dilantin 100 mg in ED and BID  5. vimpat 150 mg q AM                200 mg q PM  6. depakote 1500 mg BID  7. onfi 5 mg BID  8. ativan 1 mg IV x 1 prn SZ  9. neuro      #Tongue bite  1. warm salt water gargles TID  2. ice chips prn  3. acetaminophen      #HTN  1. amlodipine 10 mg  2. HCTZ 50 mg  3. EKG      #Tobacco use  1. refused patch        #VTE  IMPROVE VTE Individual Risk Assessment    RISK                                                                Points    [  ] Previous VTE                                                  3    [  ] Thrombophilia                                               2    [  ] Lower limb paralysis                                      2        (unable to hold up >15 seconds)      [  ] Current Cancer                                              2         (within 6 months)    [  ] Immobilization > 24 hrs                                1    [  ] ICU/CCU stay > 24 hours                              1    [  ] Age > 60                                                      1    IMPROVE VTE Score __0_______    IMPROVE Score 0-1: Low Risk, No VTE prophylaxis required for most patients, encourage ambulation.   IMPROVE Score 2-3: At risk, pharmacologic VTE prophylaxis is indicated for most patients (in the absence of a contraindication)  IMPROVE Score > or = 4: High Risk, pharmacologic VTE prophylaxis is indicated for most patients (in the absence of a contraindication)      VTE document ambulation; held for poss additional SZ  med rec initially done by pharmacy  EKG  neuro Dr. Hills [phone] tried 21:50 and 21:59  no answer  day team to recall number in AM

## 2021-07-29 NOTE — ED PROVIDER NOTE - PROGRESS NOTE DETAILS
Medication review by Ed pharmacist showing patient should be taking Depakote 1500mg BID, and Onfi 5mg. Patient unaware of Onfi medication, known noncompliance with depakote. Will write medications to patient's pharmacy. To be dosed here and will discharge with scripts for medications.

## 2021-07-29 NOTE — ED PROVIDER NOTE - NS ED ROS FT
General: Denies fever, chills  HEENT: Denies visual changes, sore throat  Neck: Denies neck pain, neck stiffness  Resp: Denies coughing, SOB  Cardiovascular: Denies CP, palpitations  GI: Denies abdominal pain, nausea, vomiting  : Denies dysuria, hematuria  MSK: Denies back pain  Neuro: Endorses seizure  Skin: Denies rashes

## 2021-07-29 NOTE — H&P ADULT - TELEHEALTH PROVIDER ATTESTATION:
I conducted the telehealth visit with patient.I discussed and endorsed patient care to accepting bedside physician

## 2021-07-30 LAB
ANION GAP SERPL CALC-SCNC: 15 MMOL/L — SIGNIFICANT CHANGE UP (ref 5–17)
BUN SERPL-MCNC: 12.5 MG/DL — SIGNIFICANT CHANGE UP (ref 8–20)
CALCIUM SERPL-MCNC: 9.4 MG/DL — SIGNIFICANT CHANGE UP (ref 8.6–10.2)
CHLORIDE SERPL-SCNC: 98 MMOL/L — SIGNIFICANT CHANGE UP (ref 98–107)
CO2 SERPL-SCNC: 21 MMOL/L — LOW (ref 22–29)
COVID-19 SPIKE DOMAIN AB INTERP: POSITIVE
COVID-19 SPIKE DOMAIN ANTIBODY RESULT: 19.7 U/ML — HIGH
CREAT SERPL-MCNC: 0.98 MG/DL — SIGNIFICANT CHANGE UP (ref 0.5–1.3)
GLUCOSE SERPL-MCNC: 104 MG/DL — HIGH (ref 70–99)
MAGNESIUM SERPL-MCNC: 1.9 MG/DL — SIGNIFICANT CHANGE UP (ref 1.8–2.6)
POTASSIUM SERPL-MCNC: 3.8 MMOL/L — SIGNIFICANT CHANGE UP (ref 3.5–5.3)
POTASSIUM SERPL-SCNC: 3.8 MMOL/L — SIGNIFICANT CHANGE UP (ref 3.5–5.3)
SARS-COV-2 IGG+IGM SERPL QL IA: 19.7 U/ML — HIGH
SARS-COV-2 IGG+IGM SERPL QL IA: POSITIVE
SODIUM SERPL-SCNC: 134 MMOL/L — LOW (ref 135–145)

## 2021-07-30 PROCEDURE — 95720 EEG PHY/QHP EA INCR W/VEEG: CPT

## 2021-07-30 PROCEDURE — 99254 IP/OBS CNSLTJ NEW/EST MOD 60: CPT

## 2021-07-30 PROCEDURE — 99233 SBSQ HOSP IP/OBS HIGH 50: CPT

## 2021-07-30 RX ORDER — NIFEDIPINE 30 MG
60 TABLET, EXTENDED RELEASE 24 HR ORAL DAILY
Refills: 0 | Status: DISCONTINUED | OUTPATIENT
Start: 2021-07-31 | End: 2021-08-01

## 2021-07-30 RX ORDER — ENOXAPARIN SODIUM 100 MG/ML
40 INJECTION SUBCUTANEOUS DAILY
Refills: 0 | Status: DISCONTINUED | OUTPATIENT
Start: 2021-07-30 | End: 2021-08-02

## 2021-07-30 RX ORDER — LACOSAMIDE 50 MG/1
150 TABLET ORAL
Refills: 0 | Status: DISCONTINUED | OUTPATIENT
Start: 2021-07-30 | End: 2021-08-02

## 2021-07-30 RX ORDER — LIDOCAINE 4 G/100G
5 CREAM TOPICAL
Refills: 0 | Status: DISCONTINUED | OUTPATIENT
Start: 2021-07-30 | End: 2021-08-01

## 2021-07-30 RX ADMIN — CLOBAZAM 5 MILLIGRAM(S): 10 TABLET ORAL at 05:11

## 2021-07-30 RX ADMIN — LACOSAMIDE 150 MILLIGRAM(S): 50 TABLET ORAL at 08:15

## 2021-07-30 RX ADMIN — LIDOCAINE 5 MILLILITER(S): 4 CREAM TOPICAL at 12:10

## 2021-07-30 RX ADMIN — AMLODIPINE BESYLATE 10 MILLIGRAM(S): 2.5 TABLET ORAL at 05:11

## 2021-07-30 RX ADMIN — DIVALPROEX SODIUM 1500 MILLIGRAM(S): 500 TABLET, DELAYED RELEASE ORAL at 17:16

## 2021-07-30 RX ADMIN — LACOSAMIDE 150 MILLIGRAM(S): 50 TABLET ORAL at 17:15

## 2021-07-30 RX ADMIN — Medication 50 MILLIGRAM(S): at 05:11

## 2021-07-30 RX ADMIN — Medication 100 MILLIGRAM(S): at 12:10

## 2021-07-30 RX ADMIN — ENOXAPARIN SODIUM 40 MILLIGRAM(S): 100 INJECTION SUBCUTANEOUS at 17:24

## 2021-07-30 RX ADMIN — DIVALPROEX SODIUM 1500 MILLIGRAM(S): 500 TABLET, DELAYED RELEASE ORAL at 05:11

## 2021-07-30 RX ADMIN — Medication 650 MILLIGRAM(S): at 21:40

## 2021-07-30 RX ADMIN — LIDOCAINE 5 MILLILITER(S): 4 CREAM TOPICAL at 17:16

## 2021-07-30 RX ADMIN — Medication 200 MILLIGRAM(S): at 17:16

## 2021-07-30 NOTE — CONSULT NOTE ADULT - ASSESSMENT
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 Personally reviewed all imagines, labs, EEG and other studies.    Impression:  1. Focal epilepsy: Breakthrough seizures due to med noncompliance. Educated importance of consistently taking his antiepileptic medication.  2. Left arm/leg paresis: Suspect Misbah's paresis, as patient reports commonly present after cluster of seizures and rapidly improving.   3. HTN      Recommendation:  - cvEEG and may discontinue tomorrow is no concerning findings  - continue antiepileptic medication that he should be on: divalproex sodium DR 1500mg BID, phenytoin ER 200mg BID, lacosamide 150mg BID (ordered)   - monitor left sided paresis; if doesn't improve by tomorrow, consider imaging  - seizure precaution  - follow-up with patient's outpatient provider Dr. Ayon      Thank you for allowing Epilepsy to participate in the care of this patient.     Epilepsy off-service on weekends.  Dr. Grace will provide coverage this weekend.  ______________________  Lucas Hills MD   Director, Epilepsy/EMU - [facility]   Epilepsy Consult #: [id number] ([phone])

## 2021-07-30 NOTE — PROGRESS NOTE ADULT - ASSESSMENT
26 year old male w Hx HTN, seizures came to ED by ambulance after witnessed sz last night and this AM and then again in ED      # Recurrent Seizures in 24 hours  Noncompliant with AEDs  Depakote level low  Sz precautions  S/P ativan 1 mg IV for Sz in ED  1. Dilantin 100 mg in ED and BID  2. Vimpat 150 mg q AM                200 mg q PM  3. Depakote 1500 mg BID  4. Onfi 5 mg BID  5. Ativan 1 mg IV x 1 prn Sz  Epileptology consulted      # Tongue bite  1. Warm salt water gargles TID  2. Ice chips prn  3. Acetaminophen  4. Lidocaine viscous swish and spit before meals    # Mild Tachycardia, Mild hyponatremia with mild Metabolic acidosis  Will d/c HCTZ and Norvasc  Add Nifedipine instead    # HTN- controlled  1. Amlodipine 10 mg changed as above   2. HCTZ 50 mg changed as above    # Tobacco use  1. refused patch    Lovenox
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
## 2021-07-30 NOTE — PATIENT PROFILE ADULT - NSTRANSFERBELONGINGSRESP_GEN_A_NUR
Left message for pt notifying that labs stable. Requested that pt return clinic call to confirm thyroid medications. Labs ordered for 3 months. yes

## 2021-07-31 ENCOUNTER — TRANSCRIPTION ENCOUNTER (OUTPATIENT)
Age: 26
End: 2021-07-31

## 2021-07-31 LAB
ANION GAP SERPL CALC-SCNC: 16 MMOL/L — SIGNIFICANT CHANGE UP (ref 5–17)
BUN SERPL-MCNC: 12.5 MG/DL — SIGNIFICANT CHANGE UP (ref 8–20)
CALCIUM SERPL-MCNC: 9.9 MG/DL — SIGNIFICANT CHANGE UP (ref 8.6–10.2)
CHLORIDE SERPL-SCNC: 95 MMOL/L — LOW (ref 98–107)
CO2 SERPL-SCNC: 23 MMOL/L — SIGNIFICANT CHANGE UP (ref 22–29)
CREAT SERPL-MCNC: 0.81 MG/DL — SIGNIFICANT CHANGE UP (ref 0.5–1.3)
GLUCOSE SERPL-MCNC: 81 MG/DL — SIGNIFICANT CHANGE UP (ref 70–99)
POTASSIUM SERPL-MCNC: 3.6 MMOL/L — SIGNIFICANT CHANGE UP (ref 3.5–5.3)
POTASSIUM SERPL-SCNC: 3.6 MMOL/L — SIGNIFICANT CHANGE UP (ref 3.5–5.3)
SODIUM SERPL-SCNC: 134 MMOL/L — LOW (ref 135–145)

## 2021-07-31 PROCEDURE — 99232 SBSQ HOSP IP/OBS MODERATE 35: CPT

## 2021-07-31 PROCEDURE — 93010 ELECTROCARDIOGRAM REPORT: CPT

## 2021-07-31 PROCEDURE — 95718 EEG PHYS/QHP 2-12 HR W/VEEG: CPT

## 2021-07-31 RX ORDER — LACOSAMIDE 50 MG/1
1 TABLET ORAL
Qty: 0 | Refills: 0 | DISCHARGE

## 2021-07-31 RX ORDER — DIVALPROEX SODIUM 500 MG/1
3 TABLET, DELAYED RELEASE ORAL
Qty: 84 | Refills: 0
Start: 2021-07-31 | End: 2021-09-26

## 2021-07-31 RX ORDER — LACOSAMIDE 50 MG/1
1 TABLET ORAL
Qty: 60 | Refills: 0
Start: 2021-07-31 | End: 2021-08-29

## 2021-07-31 RX ORDER — CLOBAZAM 10 MG/1
1 TABLET ORAL
Qty: 0 | Refills: 0 | DISCHARGE

## 2021-07-31 RX ORDER — AMLODIPINE BESYLATE 2.5 MG/1
1 TABLET ORAL
Qty: 30 | Refills: 0
Start: 2021-07-31 | End: 2021-08-29

## 2021-07-31 RX ORDER — AMLODIPINE BESYLATE 2.5 MG/1
1 TABLET ORAL
Qty: 14 | Refills: 0
Start: 2021-07-31 | End: 2021-09-26

## 2021-07-31 RX ORDER — BENZOCAINE 10 %
1 GEL (GRAM) MUCOUS MEMBRANE
Qty: 1 | Refills: 0
Start: 2021-07-31 | End: 2021-08-06

## 2021-07-31 RX ORDER — DIVALPROEX SODIUM 500 MG/1
3 TABLET, DELAYED RELEASE ORAL
Qty: 180 | Refills: 0
Start: 2021-07-31 | End: 2021-08-29

## 2021-07-31 RX ORDER — ACETAMINOPHEN 500 MG
2 TABLET ORAL
Qty: 0 | Refills: 0 | DISCHARGE
Start: 2021-07-31

## 2021-07-31 RX ORDER — AMLODIPINE BESYLATE 2.5 MG/1
1 TABLET ORAL
Qty: 0 | Refills: 0 | DISCHARGE

## 2021-07-31 RX ADMIN — LIDOCAINE 5 MILLILITER(S): 4 CREAM TOPICAL at 00:32

## 2021-07-31 RX ADMIN — ENOXAPARIN SODIUM 40 MILLIGRAM(S): 100 INJECTION SUBCUTANEOUS at 11:36

## 2021-07-31 RX ADMIN — Medication 60 MILLIGRAM(S): at 06:20

## 2021-07-31 RX ADMIN — LIDOCAINE 5 MILLILITER(S): 4 CREAM TOPICAL at 11:36

## 2021-07-31 RX ADMIN — LACOSAMIDE 150 MILLIGRAM(S): 50 TABLET ORAL at 17:07

## 2021-07-31 RX ADMIN — DIVALPROEX SODIUM 1500 MILLIGRAM(S): 500 TABLET, DELAYED RELEASE ORAL at 17:08

## 2021-07-31 RX ADMIN — LACOSAMIDE 150 MILLIGRAM(S): 50 TABLET ORAL at 06:20

## 2021-07-31 RX ADMIN — LIDOCAINE 5 MILLILITER(S): 4 CREAM TOPICAL at 06:19

## 2021-07-31 RX ADMIN — Medication 200 MILLIGRAM(S): at 17:07

## 2021-07-31 RX ADMIN — LIDOCAINE 5 MILLILITER(S): 4 CREAM TOPICAL at 17:07

## 2021-07-31 RX ADMIN — LIDOCAINE 5 MILLILITER(S): 4 CREAM TOPICAL at 21:54

## 2021-07-31 RX ADMIN — DIVALPROEX SODIUM 1500 MILLIGRAM(S): 500 TABLET, DELAYED RELEASE ORAL at 06:19

## 2021-07-31 RX ADMIN — Medication 200 MILLIGRAM(S): at 06:20

## 2021-07-31 NOTE — DISCHARGE NOTE PROVIDER - NSDCCPCAREPLAN_GEN_ALL_CORE_FT
PRINCIPAL DISCHARGE DIAGNOSIS  Diagnosis: Seizure  Assessment and Plan of Treatment: PO depakote 1500mg twice a day  Phenytoin 200mg !tablet twice a day  Lacodamide 150mg 1 tab twice a day  Follow up with epliptologist Dr Hills in 1 week      SECONDARY DISCHARGE DIAGNOSES  Diagnosis: Hypertension  Assessment and Plan of Treatment: PO norvasc 10mg 1 tab once a day  Schedule an appointment with the [facility] until established with a primary care physician

## 2021-07-31 NOTE — PROGRESS NOTE ADULT - ASSESSMENT
The patient is a 26 year old male with a past medical history of hypertension and seizure disorder who was admitted to the hospital for 2 witnessed seizure episodes.   Seen by Dr Hills, depakote level was low as patient ran out of his medications. EEG was done which was negative.     Assessment/Plan:    1. Recurrent seizure: Patient ran out of depakote prior to admission  EEG negative  COnitnue vimpat, phenytoin and depakote     2. Tongue laceration: Warm salt water gargles TID   Ice chips prn  Lidocaine viscous swish and spit before meals    3. Hypertension on nifedepine    Discharge disposition: Medically stable for discharge however needs emergency housing. per SW cannot be set up until Monday

## 2021-07-31 NOTE — DISCHARGE NOTE NURSING/CASE MANAGEMENT/SOCIAL WORK - PATIENT PORTAL LINK FT
You can access the FollowMyHealth Patient Portal offered by [facility] by registering at the following website: [email or web address]. By joining [employer]’s FollowMyHealth portal, you will also be able to view your health information using other applications (apps) compatible with our system.

## 2021-07-31 NOTE — DISCHARGE NOTE PROVIDER - CARE PROVIDER_API CALL
Lucas Hills)  EEGEpilepsy; Neurology  [address] [place] [address]  Phone: [phone]  Fax: [phone]  Follow Up Time: 1 week    [facility],   [facility], [place] [address] [phone]  Phone: (   )    -  Fax: (   )    -  Follow Up Time:

## 2021-07-31 NOTE — DISCHARGE NOTE PROVIDER - HOSPITAL COURSE
The patient is a 26 year old male with a past medical history of hypertension and seizure disorder who was admitted to the hospital for 2 witnessed seizure episodes.   Seen by Dr Hills, depakote level was low as patient ran out of his medications. EEG was done which was negative. Discharged home to follow up as an outpatient.     43 mins spent coordinating care and discharge    INTERVAL HPI/OVERNIGHT EVENTS:    Patient seen and examined, complaints of pain at the left surface of his tongue.     Vital Signs Last 24 Hrs  T(C): 36.8 (31 Jul 2021 10:26), Max: 37.1 (30 Jul 2021 16:16)  T(F): 98.2 (31 Jul 2021 10:26), Max: 98.8 (30 Jul 2021 16:16)  HR: 78 (31 Jul 2021 10:26) (73 - 86)  BP: 130/82 (31 Jul 2021 10:26) (118/73 - 130/82)  BP(mean): --  RR: 18 (31 Jul 2021 10:26) (18 - 18)  SpO2: 94% (31 Jul 2021 10:26) (74% - 96%)    PHYSICAL EXAM:    GENERAL: NAD, AOX3  HEAD:  Atraumatic, Normocephalic  ENMT: TOngue laceration  NECK: Supple, No JVD  NERVOUS SYSTEM:  Alert & Oriented X3, Motor Strength 5/5 B/L upper and lower extremities; DTRs 2+ intact and symmetric  CHEST/LUNG: Clear to auscultation bilaterally; No rales, rhonchi, wheezing, or rubs  HEART: Regular rate and rhythm; No murmurs, rubs, or gallops  ABDOMEN: Soft, Nontender, Nondistended; Bowel sounds present  EXTREMITIES:  2+ Peripheral Pulses, No clubbing, cyanosis, or edema        MEDICATIONS  (STANDING):  diVALproex DR 1500 milliGRAM(s) Oral two times a day  enoxaparin Injectable 40 milliGRAM(s) SubCutaneous daily  lacosamide 150 milliGRAM(s) Oral two times a day  lidocaine 2% Viscous 5 milliLiter(s) Swish and Spit four times a day  NIFEdipine XL 60 milliGRAM(s) Oral daily  phenytoin   Capsule 200 milliGRAM(s) Oral two times a day    MEDICATIONS  (PRN):  acetaminophen   Tablet .. 650 milliGRAM(s) Oral every 6 hours PRN Temp greater or equal to 38.5C (101.3F), Mild Pain (1 - 3)  aluminum hydroxide/magnesium hydroxide/simethicone Suspension 30 milliLiter(s) Oral every 4 hours PRN Dyspepsia  LORazepam   Injectable 1 milliGRAM(s) IV Push once PRN seizure  melatonin 3 milliGRAM(s) Oral at bedtime PRN Insomnia      Allergies    No Known Allergies    Intolerances          LABS:                          16.2   9.87  )-----------( 222      ( 29 Jul 2021 18:41 )             50.4     07-31    134<L>  |  95<L>  |  12.5  ----------------------------<  81  3.6   |  23.0  |  0.81    Ca    9.9      31 Jul 2021 08:49  Mg     1.9     07-30            RADIOLOGY & ADDITIONAL TESTS:       The patient is a 26 year old male with a past medical history of hypertension and seizure disorder who was admitted to the hospital for 2 witnessed seizure episodes.   Seen by Dr Hills, depakote level was low as patient ran out of his medications. EEG was done which was negative. Discharged home to follow up as an outpatient.     43 mins spent coordinating care and discharge

## 2021-07-31 NOTE — EEG REPORT - NS EEG TEXT BOX
[facility]   COMPREHENSIVE EPILEPSY CENTER   REPORT OF LONG-TERM VIDEO EEG     [facility]: [address], [place] [address], Ph#: [phone]  [facility]: [address] [place] [address], Ph#: [phone]  [facility]: [address] [place] [address], Ph#: [phone]    Patient Name: JETHRO SPANN  Age and : 26y ([birth year])  MRN #: [id number]  Location: [facility] [address] 01  Referring Physician: Troy Dela Cruz    Start Time/Date: 14:27 on 2021  End Time/Date: 09:10 on   Duration: 18:20    _____________________________________________________________  STUDY INFORMATION    EEG Recording Technique:  The patient underwent continuous Video-EEG monitoring, using Telemetry System hardware on the XLTek Digital System. EEG and video data were stored on a computer hard drive with important events saved in digital archive files. The material was reviewed by a physician (electroencephalographer / epileptologist) on a daily basis. Trevin and seizure detection algorithms were utilized and reviewed. An EEG Technician attended to the patient, and was available throughout daytime work hours.  The epilepsy center neurologist was available in person or on call 24-hours per day.    EEG Placement and Labeling of Electrodes:  The EEG was performed utilizing 20 channel referential EEG connections (coronal over temporal over parasagittal montage) using all standard 10-20 electrode placements with EKG, with additional electrodes placed in the inferior temporal region using the modified 10-10 montage electrode placements for elective admissions, or if deemed necessary. Recording was at a sampling rate of 256 samples per second per channel. Time synchronized digital video recording was done simultaneously with EEG recording. A low light infrared camera was used for low light recording.     _____________________________________________________________  HISTORY    Patient is a 26y old  Male who presents with a chief complaint of Seizures (2021 10:10)      PERTINENT MEDICATION:  diVALproex DR 1500 milliGRAM(s) Oral two times a day  lacosamide 150 milliGRAM(s) Oral two times a day  phenytoin   Capsule 200 milliGRAM(s) Oral two times a day    _____________________________________________________________  STUDY INTERPRETATION    Findings: The background was continuous, spontaneously variable and reactive. During wakefulness, No posterior dominant rhythm seen.    Background Slowing:  Diffuse theta and polymorphic delta slowing.    Focal Slowing:   None were present.    Sleep Background:  Drowsiness was characterized by fragmentation, attenuation, and slowing of the background activity.    Sleep was characterized by the presence of vertex waves, symmetric sleep spindles and K-complexes.    Other Non-Epileptiform Findings:  None were present.    Interictal Epileptiform Activity:   None were present.    Events:  Clinical events: None recorded.  Seizures: None recorded.    Activation Procedures:   Hyperventilation was not performed.    Photic stimulation was not performed.     Artifacts:  Intermittent myogenic and movement artifacts were noted.    ECG:  The heart rate on single channel ECG was predominantly between 80-90 BPM.    _____________________________________________________________  EEG SUMMARY/CLASSIFICATION    Abnormal EEG in the awake, drowsy and asleep states.  - Moderate generalized slowing.    _____________________________________________________________  EEG IMPRESSION/CLINICAL CORRELATE    Abnormal EEG study.  Moderate nonspecific diffuse or multifocal cerebral dysfunction.   No epileptiform pattern or seizure seen.    Larry Nguyen MD PGY-6  Epilepsy Fellow    This Preliminary report is based on fellow review. Final report pending attending review.    Reading Room: [phone]  On Call Service After Hours: [phone] [facility]   COMPREHENSIVE EPILEPSY CENTER   REPORT OF LONG-TERM VIDEO EEG     [facility]: [address], [place] [address], Ph#: [phone]  [facility]: [address] [place] [address], Ph#: [phone]  [facility]: [address] [place] [address], Ph#: [phone]    Patient Name: JETHRO SPANN  Age and : 26y ([birth year])  MRN #: [id number]  Location: [facility] [address]25 01  Referring Physician: Troy Dela Cruz    Start Time/Date: 14:27 on 2021  End Time/Date: 09:10 on   Duration: 18:20    _____________________________________________________________  STUDY INFORMATION    EEG Recording Technique:  The patient underwent continuous Video-EEG monitoring, using Telemetry System hardware on the XLTek Digital System. EEG and video data were stored on a computer hard drive with important events saved in digital archive files. The material was reviewed by a physician (electroencephalographer / epileptologist) on a daily basis. Trevin and seizure detection algorithms were utilized and reviewed. An EEG Technician attended to the patient, and was available throughout daytime work hours.  The epilepsy center neurologist was available in person or on call 24-hours per day.    EEG Placement and Labeling of Electrodes:  The EEG was performed utilizing 20 channel referential EEG connections (coronal over temporal over parasagittal montage) using all standard 10-20 electrode placements with EKG, with additional electrodes placed in the inferior temporal region using the modified 10-10 montage electrode placements for elective admissions, or if deemed necessary. Recording was at a sampling rate of 256 samples per second per channel. Time synchronized digital video recording was done simultaneously with EEG recording. A low light infrared camera was used for low light recording.     _____________________________________________________________  HISTORY    Patient is a 26y old  Male who presents with a chief complaint of Seizures (2021 10:10)      PERTINENT MEDICATION:  diVALproex DR 1500 milliGRAM(s) Oral two times a day  lacosamide 150 milliGRAM(s) Oral two times a day  phenytoin   Capsule 200 milliGRAM(s) Oral two times a day    _____________________________________________________________  STUDY INTERPRETATION    Findings: The background was continuous, spontaneously variable and reactive. During wakefulness, No posterior dominant rhythm seen.    Background Slowing:  Diffuse theta and polymorphic delta slowing.    Focal Slowing:   None were present.    Sleep Background:  Drowsiness was characterized by fragmentation, attenuation, and slowing of the background activity.    Sleep was characterized by the presence of vertex waves, symmetric sleep spindles and K-complexes.    Other Non-Epileptiform Findings:  None were present.    Interictal Epileptiform Activity:   None were present.    Events:  Clinical events: None recorded.  Seizures: None recorded.    Activation Procedures:   Hyperventilation was not performed.    Photic stimulation was not performed.     Artifacts:  Intermittent myogenic and movement artifacts were noted.    ECG:  The heart rate on single channel ECG was predominantly between 80-90 BPM.    _____________________________________________________________  EEG SUMMARY/CLASSIFICATION    Abnormal EEG in the awake, drowsy and asleep states.  - Mild to Moderate generalized slowing.    _____________________________________________________________  EEG IMPRESSION/CLINICAL CORRELATE    Abnormal EEG study.  Mild to Moderate nonspecific diffuse or multifocal cerebral dysfunction.   No epileptiform pattern or seizure seen.    Larry Nguyen MD PGY-6  Epilepsy Fellow    Reading Room: [phone]  On Call Service After Hours: [phone]    Clement Hays MD  EEG/Epilepsy Attending

## 2021-07-31 NOTE — DISCHARGE NOTE PROVIDER - PROVIDER TOKENS
PROVIDER:[TOKEN:[[id number]],FOLLOWUP:[1 week]],FREE:[LAST:[[facility]],PHONE:[(   )    -],FAX:[(   )    -],ADDRESS:[[address], [place] [address] [phone]]]

## 2021-07-31 NOTE — DISCHARGE NOTE PROVIDER - NSDCMRMEDTOKEN_GEN_ALL_CORE_FT
acetaminophen 325 mg oral tablet: 2 tab(s) orally every 6 hours, As needed, Temp greater or equal to 38.5C (101.3F), Mild Pain (1 - 3)  amLODIPine 10 mg oral tablet: 1 tab(s) orally once a day  divalproex sodium 500 mg oral delayed release tablet: 3 tab(s) orally 2 times a day  lacosamide 150 mg oral tablet: 1 tab(s) orally 2 times a day MDD:2 tabs per day  Orajel Severe Pain Formula 20% mucous membrane gel: Apply topically to affected area 2 times a day   phenytoin 200 mg oral capsule, extended release: 1 cap(s) orally 2 times a day MDD:2 tabs per day

## 2021-07-31 NOTE — PROGRESS NOTE ADULT - ASSESSMENT
26 year old man with epilepsy now status post seizure in setting of non compliance.     Epilepsy.   c-EEG in progress.   Await report. Will likely disconnect later today.   Continue current medication regimen.   Vimpat 150 BID  Dilantin 200 BID  [id number] BID.

## 2021-07-31 NOTE — EEG REPORT - NS EEG TEXT BOX
Health system   COMPREHENSIVE EPILEPSY CENTER   REPORT OF LONG-TERM VIDEO EEG     [facility]: [address], [place] [address], Ph#: [phone]  [facility]: [address], [place] [address], Ph#: [phone]  [facility]: [address] [place] [address], Ph#: [phone]    Patient Name: JETHRO SPANN  Age and : 26y ([birth year])  MRN #: [id number]  Location: [address] [address] [address]  Referring Physician: Troy Dela Cruz    Start Time/Date: 9AM on 2021  End Time/Date: 6PM on 2021  Duration: 6H    _____________________________________________________________  STUDY INFORMATION    EEG Recording Technique:  The patient underwent continuous Video-EEG monitoring, using Telemetry System hardware on the XLTek Digital System. EEG and video data were stored on a computer hard drive with important events saved in digital archive files. The material was reviewed by a physician (electroencephalographer / epileptologist) on a daily basis. Trevin and seizure detection algorithms were utilized and reviewed. An EEG Technician attended to the patient, and was available throughout daytime work hours.  The epilepsy center neurologist was available in person or on call 24-hours per day.    EEG Placement and Labeling of Electrodes:  The EEG was performed utilizing 20 channel referential EEG connections (coronal over temporal over parasagittal montage) using all standard 10-20 electrode placements with EKG, with additional electrodes placed in the inferior temporal region using the modified 10-10 montage electrode placements for elective admissions, or if deemed necessary. Recording was at a sampling rate of 256 samples per second per channel. Time synchronized digital video recording was done simultaneously with EEG recording. A low light infrared camera was used for low light recording.     _____________________________________________________________  HISTORY    Patient is a 26y old  Male who presents with a chief complaint of Seizures (2021 10:10)      PERTINENT MEDICATION:  diVALproex DR 1500 milliGRAM(s) Oral two times a day  lacosamide 150 milliGRAM(s) Oral two times a day  phenytoin   Capsule 200 milliGRAM(s) Oral two times a day    _____________________________________________________________  STUDY INTERPRETATION    Findings: The background was continuous, spontaneously variable and reactive. During wakefulness, No posterior dominant rhythm seen.    Background Slowing:  Diffuse theta and polymorphic delta slowing.    Focal Slowing:   None were present.    Sleep Background:  Drowsiness was characterized by fragmentation, attenuation, and slowing of the background activity.    Sleep was characterized by the presence of vertex waves, symmetric sleep spindles and K-complexes.    Other Non-Epileptiform Findings:  None were present.    Interictal Epileptiform Activity:   None were present.    Events:  Clinical events: None recorded.  Seizures: None recorded.    Activation Procedures:   Hyperventilation was not performed.    Photic stimulation was not performed.     Artifacts:  Intermittent myogenic and movement artifacts were noted.    ECG:  The heart rate on single channel ECG was predominantly between 80-90 BPM.    _____________________________________________________________  EEG SUMMARY/CLASSIFICATION    Abnormal EEG in the awake, drowsy and asleep states.  - Mild to Moderate generalized slowing.    _____________________________________________________________  EEG IMPRESSION/CLINICAL CORRELATE    Abnormal EEG study.  Mild to Moderate nonspecific diffuse or multifocal cerebral dysfunction.   No epileptiform pattern or seizure seen.    Larry Nguyen MD PGY-6  Epilepsy Fellow    Reading Room: [phone]  On Call Service After Hours: [phone]    Clement Hays MD  EEG/Epilepsy Attending

## 2021-08-01 PROCEDURE — 99232 SBSQ HOSP IP/OBS MODERATE 35: CPT

## 2021-08-01 RX ORDER — BENZOCAINE 10 %
1 GEL (GRAM) MUCOUS MEMBRANE THREE TIMES A DAY
Refills: 0 | Status: DISCONTINUED | OUTPATIENT
Start: 2021-08-01 | End: 2021-08-02

## 2021-08-01 RX ORDER — AMLODIPINE BESYLATE 2.5 MG/1
5 TABLET ORAL DAILY
Refills: 0 | Status: DISCONTINUED | OUTPATIENT
Start: 2021-08-01 | End: 2021-08-02

## 2021-08-01 RX ADMIN — LACOSAMIDE 150 MILLIGRAM(S): 50 TABLET ORAL at 17:11

## 2021-08-01 RX ADMIN — Medication 200 MILLIGRAM(S): at 05:28

## 2021-08-01 RX ADMIN — DIVALPROEX SODIUM 1500 MILLIGRAM(S): 500 TABLET, DELAYED RELEASE ORAL at 17:11

## 2021-08-01 RX ADMIN — LIDOCAINE 5 MILLILITER(S): 4 CREAM TOPICAL at 05:28

## 2021-08-01 RX ADMIN — LIDOCAINE 5 MILLILITER(S): 4 CREAM TOPICAL at 11:29

## 2021-08-01 RX ADMIN — Medication 60 MILLIGRAM(S): at 05:28

## 2021-08-01 RX ADMIN — LACOSAMIDE 150 MILLIGRAM(S): 50 TABLET ORAL at 05:28

## 2021-08-01 RX ADMIN — ENOXAPARIN SODIUM 40 MILLIGRAM(S): 100 INJECTION SUBCUTANEOUS at 11:29

## 2021-08-01 RX ADMIN — DIVALPROEX SODIUM 1500 MILLIGRAM(S): 500 TABLET, DELAYED RELEASE ORAL at 05:28

## 2021-08-01 RX ADMIN — Medication 1 SPRAY(S): at 22:41

## 2021-08-01 RX ADMIN — Medication 200 MILLIGRAM(S): at 17:11

## 2021-08-01 NOTE — PROGRESS NOTE ADULT - ASSESSMENT
The patient is a 26 year old male with a past medical history of hypertension and seizure disorder who was admitted to the hospital for 2 witnessed seizure episodes.   Seen by Dr Hills, Depakote level was low as patient ran out of his medications. EEG was done which was negative.     Assessment/Plan:    1. Recurrent seizure: Patient ran out of Depakote prior to admission  EEG negative  Continue Vimpat phenytoin and Depakote     2. Tongue laceration: Warm salt water gargles TID   Ice chips prn  Lidocaine viscous swish and spit before meals    3. Hypertension on Nifedipine    Discharge disposition: Medically stable for discharge however needs emergency housing. per  cannot be set up until tomorrow

## 2021-08-02 VITALS — DIASTOLIC BLOOD PRESSURE: 91 MMHG | SYSTOLIC BLOOD PRESSURE: 124 MMHG | HEART RATE: 75 BPM

## 2021-08-02 LAB
PHENYTOIN FREE SERPL-MCNC: <2.9 UG/ML — LOW (ref 10–20)
VALPROATE SERPL-MCNC: 89.3 UG/ML — SIGNIFICANT CHANGE UP (ref 50–100)

## 2021-08-02 PROCEDURE — 36415 COLL VENOUS BLD VENIPUNCTURE: CPT

## 2021-08-02 PROCEDURE — 83735 ASSAY OF MAGNESIUM: CPT

## 2021-08-02 PROCEDURE — 86769 SARS-COV-2 COVID-19 ANTIBODY: CPT

## 2021-08-02 PROCEDURE — 99239 HOSP IP/OBS DSCHRG MGMT >30: CPT

## 2021-08-02 PROCEDURE — 80048 BASIC METABOLIC PNL TOTAL CA: CPT

## 2021-08-02 PROCEDURE — U0003: CPT

## 2021-08-02 PROCEDURE — 80185 ASSAY OF PHENYTOIN TOTAL: CPT

## 2021-08-02 PROCEDURE — 80164 ASSAY DIPROPYLACETIC ACD TOT: CPT

## 2021-08-02 PROCEDURE — 85025 COMPLETE CBC W/AUTO DIFF WBC: CPT

## 2021-08-02 PROCEDURE — 95700 EEG CONT REC W/VID EEG TECH: CPT

## 2021-08-02 PROCEDURE — 95714 VEEG EA 12-26 HR UNMNTR: CPT

## 2021-08-02 PROCEDURE — 80235 DRUG ASSAY LACOSAMIDE: CPT

## 2021-08-02 PROCEDURE — U0005: CPT

## 2021-08-02 PROCEDURE — 95711 VEEG 2-12 HR UNMONITORED: CPT

## 2021-08-02 PROCEDURE — 93005 ELECTROCARDIOGRAM TRACING: CPT

## 2021-08-02 PROCEDURE — 80307 DRUG TEST PRSMV CHEM ANLYZR: CPT

## 2021-08-02 PROCEDURE — 99285 EMERGENCY DEPT VISIT HI MDM: CPT | Mod: 25

## 2021-08-02 RX ORDER — FOSPHENYTOIN 50 MG/ML
1000 INJECTION INTRAMUSCULAR; INTRAVENOUS ONCE
Refills: 0 | Status: COMPLETED | OUTPATIENT
Start: 2021-08-02 | End: 2021-08-02

## 2021-08-02 RX ADMIN — Medication 200 MILLIGRAM(S): at 05:29

## 2021-08-02 RX ADMIN — DIVALPROEX SODIUM 1500 MILLIGRAM(S): 500 TABLET, DELAYED RELEASE ORAL at 05:29

## 2021-08-02 RX ADMIN — FOSPHENYTOIN 140 MILLIGRAM(S) PE: 50 INJECTION INTRAMUSCULAR; INTRAVENOUS at 15:34

## 2021-08-02 RX ADMIN — DIVALPROEX SODIUM 1500 MILLIGRAM(S): 500 TABLET, DELAYED RELEASE ORAL at 17:05

## 2021-08-02 RX ADMIN — Medication 1 SPRAY(S): at 05:29

## 2021-08-02 RX ADMIN — Medication 200 MILLIGRAM(S): at 17:05

## 2021-08-02 RX ADMIN — AMLODIPINE BESYLATE 5 MILLIGRAM(S): 2.5 TABLET ORAL at 05:29

## 2021-08-02 RX ADMIN — LACOSAMIDE 150 MILLIGRAM(S): 50 TABLET ORAL at 05:29

## 2021-08-02 RX ADMIN — LACOSAMIDE 150 MILLIGRAM(S): 50 TABLET ORAL at 17:04

## 2021-08-02 RX ADMIN — ENOXAPARIN SODIUM 40 MILLIGRAM(S): 100 INJECTION SUBCUTANEOUS at 10:53

## 2021-08-02 NOTE — PROGRESS NOTE ADULT - SUBJECTIVE AND OBJECTIVE BOX
[facility]                                        [facility] at [facility]                                 Kolby Trimble [facility] Dajuan                                  [address]                                        [place], [place], [address]                                             [phone]        CC: Seizure.     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 Known history of noncompliance.     Released from retirement 2 weeks; hasn't been taking any antiepileptic medication since release. Had 2 focal to bilateral tonic clonic seizures before ER arrival. Another focal to bilateral tonic clonic seizure in ER. Had postictal paresis in the left extremities, which per patient is commonly present after breakthrough seizures.    Interim history:  Now on 6 [place].   Left side improved.     ROS:   Denies headache or dizziness.  Denies chest pain.  Denies shortness of breath.    MEDICATIONS  (STANDING):  diVALproex DR 1500 milliGRAM(s) Oral two times a day  enoxaparin Injectable 40 milliGRAM(s) SubCutaneous daily  lacosamide 150 milliGRAM(s) Oral two times a day  lidocaine 2% Viscous 5 milliLiter(s) Swish and Spit four times a day  NIFEdipine XL 60 milliGRAM(s) Oral daily  phenytoin   Capsule 200 milliGRAM(s) Oral two times a day      Vital Signs Last 24 Hrs  T(C): 36.8 (31 Jul 2021 04:48), Max: 37.1 (30 Jul 2021 16:16)  T(F): 98.2 (31 Jul 2021 04:48), Max: 98.8 (30 Jul 2021 16:16)  HR: 73 (31 Jul 2021 04:48) (73 - 101)  BP: 118/73 (31 Jul 2021 04:48) (117/74 - 128/81)  RR: 18 (31 Jul 2021 04:48) (18 - 18)  SpO2: 74% (31 Jul 2021 04:48) (74% - 96%)    Detailed Neurologic Exam:    Mental status: The patient is awake and alert. There is no aphasia. There is no dysarthria.     Cranial nerves: Pupils equal and react symmetrically to light. There is no visual field deficit to threat. Extraocular motion is full with no nystagmus. Facial sensation is intact. Facial musculature is symmetric. Palate elevates symmetrically. Tongue is midline.    Motor: There is normal bulk and tone.  There is no tremor.  Strength grossly 5/5 bilaterally.    Sensation: Grossly intact to light touch and pin.    Reflexes: 2+ throughout and plantar responses are flexor.    Cerebellar: No dysmetria on finger nose testing.    Labs:     07-31    134<L>  |  95<L>  |  12.5  ----------------------------<  81  3.6   |  23.0  |  0.81    Ca    9.9      31 Jul 2021 08:49  Mg     1.9     07-30                              16.2   9.87  )-----------( 222      ( 29 Jul 2021 18:41 )             50.4             
CC: Follow up    INTERVAL HPI/OVERNIGHT EVENTS: Patient seen and examined, complaints of tongue pain       Vital Signs Last 24 Hrs  T(C): 36.8 (01 Aug 2021 11:08), Max: 37.1 (01 Aug 2021 04:26)  T(F): 98.3 (01 Aug 2021 11:08), Max: 98.8 (01 Aug 2021 04:26)  HR: 84 (01 Aug 2021 11:08) (71 - 84)  BP: 118/77 (01 Aug 2021 11:08) (99/65 - 118/77)  BP(mean): --  RR: 18 (01 Aug 2021 11:08) (18 - 18)  SpO2: 94% (01 Aug 2021 11:08) (93% - 94%)    PHYSICAL EXAM:    GENERAL: NAD, AOX3  EYES: conjunctiva and sclera clear  ENMT: Tongue laceration noted  NECK: Supple, No JVD  CHEST/LUNG: Clear to auscultation bilaterally; No rales, rhonchi, wheezing, or rubs  HEART: Regular rate and rhythm; No murmurs, rubs, or gallops  ABDOMEN: Soft, Nontender, Nondistended; Bowel sounds present  EXTREMITIES:  2+ Peripheral Pulses, No clubbing, cyanosis, or edema        MEDICATIONS  (STANDING):  amLODIPine   Tablet 5 milliGRAM(s) Oral daily  diVALproex DR 1500 milliGRAM(s) Oral two times a day  enoxaparin Injectable 40 milliGRAM(s) SubCutaneous daily  lacosamide 150 milliGRAM(s) Oral two times a day  lidocaine 2% Viscous 5 milliLiter(s) Swish and Spit four times a day  phenytoin   Capsule 200 milliGRAM(s) Oral two times a day    MEDICATIONS  (PRN):  acetaminophen   Tablet .. 650 milliGRAM(s) Oral every 6 hours PRN Temp greater or equal to 38.5C (101.3F), Mild Pain (1 - 3)  aluminum hydroxide/magnesium hydroxide/simethicone Suspension 30 milliLiter(s) Oral every 4 hours PRN Dyspepsia  LORazepam   Injectable 1 milliGRAM(s) IV Push once PRN seizure  melatonin 3 milliGRAM(s) Oral at bedtime PRN Insomnia      Allergies    No Known Allergies    Intolerances          LABS:      07-31    134<L>  |  95<L>  |  12.5  ----------------------------<  81  3.6   |  23.0  |  0.81    Ca    9.9      31 Jul 2021 08:49            RADIOLOGY & ADDITIONAL TESTS:  
CC: Follow up    INTERVAL HPI/OVERNIGHT EVENTS: Patient seen and examined, had an episode last night witnessed by RN of upper extremity shaking lasting for 2 minutes. He does not recall the episode was told that he had a seizure after. Not medicated for episode      Vital Signs Last 24 Hrs  T(C): 36.5 (02 Aug 2021 11:07), Max: 37 (01 Aug 2021 16:56)  T(F): 97.7 (02 Aug 2021 11:07), Max: 98.6 (01 Aug 2021 16:56)  HR: 85 (02 Aug 2021 11:07) (78 - 99)  BP: 119/78 (02 Aug 2021 11:07) (119/78 - 148/92)  BP(mean): --  RR: 18 (02 Aug 2021 11:07) (18 - 19)  SpO2: 96% (02 Aug 2021 11:07) (94% - 98%)    PHYSICAL EXAM:    GENERAL: NAD, wAOX3  HEAD:  Atraumatic, Normocephalic  EYES:  conjunctiva and sclera clear  ENMT: left tongue laceration   NECK: Supple, No JVD  CHEST/LUNG: Clear to auscultation bilaterally; No rales, rhonchi, wheezing, or rubs  HEART: Regular rate and rhythm; No murmurs, rubs, or gallops  ABDOMEN: Soft, Nontender, Nondistended; Bowel sounds present  EXTREMITIES:  2+ Peripheral Pulses, No clubbing, cyanosis, or edema        MEDICATIONS  (STANDING):  amLODIPine   Tablet 5 milliGRAM(s) Oral daily  benzocaine 20% Spray 1 Spray(s) Topical three times a day  diVALproex DR 1500 milliGRAM(s) Oral two times a day  enoxaparin Injectable 40 milliGRAM(s) SubCutaneous daily  lacosamide 150 milliGRAM(s) Oral two times a day  phenytoin   Capsule 200 milliGRAM(s) Oral two times a day    MEDICATIONS  (PRN):  acetaminophen   Tablet .. 650 milliGRAM(s) Oral every 6 hours PRN Temp greater or equal to 38.5C (101.3F), Mild Pain (1 - 3)  aluminum hydroxide/magnesium hydroxide/simethicone Suspension 30 milliLiter(s) Oral every 4 hours PRN Dyspepsia  LORazepam   Injectable 1 milliGRAM(s) IV Push once PRN seizure  melatonin 3 milliGRAM(s) Oral at bedtime PRN Insomnia      Allergies    No Known Allergies    Intolerances          LABS:                  RADIOLOGY & ADDITIONAL TESTS:  
CC: Follow up     INTERVAL HPI/OVERNIGHT EVENTS: Patient seen and examined, complaints of pain on the tongue.       Vital Signs Last 24 Hrs  T(C): 36.8 (31 Jul 2021 10:26), Max: 37.1 (30 Jul 2021 16:16)  T(F): 98.2 (31 Jul 2021 10:26), Max: 98.8 (30 Jul 2021 16:16)  HR: 78 (31 Jul 2021 10:26) (73 - 86)  BP: 130/82 (31 Jul 2021 10:26) (118/73 - 130/82)  BP(mean): --  RR: 18 (31 Jul 2021 10:26) (18 - 18)  SpO2: 94% (31 Jul 2021 10:26) (74% - 96%)    PHYSICAL EXAM:    GENERAL: NAD, AOX3  HEAD:  Atraumatic, Normocephalic  EYES: conjunctiva and sclera clear  ENMT: left sided tongue laceration  NECK: Supple, No JVD  CHEST/LUNG: Clear to auscultation bilaterally; No rales, rhonchi, wheezing, or rubs  HEART: Regular rate and rhythm; No murmurs, rubs, or gallops  ABDOMEN: Soft, Nontender, Nondistended; Bowel sounds present  EXTREMITIES:  2+ Peripheral Pulses, No clubbing, cyanosis, or edema        MEDICATIONS  (STANDING):  diVALproex DR 1500 milliGRAM(s) Oral two times a day  enoxaparin Injectable 40 milliGRAM(s) SubCutaneous daily  lacosamide 150 milliGRAM(s) Oral two times a day  lidocaine 2% Viscous 5 milliLiter(s) Swish and Spit four times a day  NIFEdipine XL 60 milliGRAM(s) Oral daily  phenytoin   Capsule 200 milliGRAM(s) Oral two times a day    MEDICATIONS  (PRN):  acetaminophen   Tablet .. 650 milliGRAM(s) Oral every 6 hours PRN Temp greater or equal to 38.5C (101.3F), Mild Pain (1 - 3)  aluminum hydroxide/magnesium hydroxide/simethicone Suspension 30 milliLiter(s) Oral every 4 hours PRN Dyspepsia  LORazepam   Injectable 1 milliGRAM(s) IV Push once PRN seizure  melatonin 3 milliGRAM(s) Oral at bedtime PRN Insomnia      Allergies    No Known Allergies    Intolerances          LABS:                          16.2   9.87  )-----------( 222      ( 29 Jul 2021 18:41 )             50.4     07-31    134<L>  |  95<L>  |  12.5  ----------------------------<  81  3.6   |  23.0  |  0.81    Ca    9.9      31 Jul 2021 08:49  Mg     1.9     07-30            RADIOLOGY & ADDITIONAL TESTS:  
HEALTH ISSUES - PROBLEM Dx:    GTC Sz sec to non compliance with AEDs    INTERVAL HPI/ OVERNIGHT EVENTS:    tongue bite +  asking for pain relief  he stated to CM- he did not know the dosage of the medication to take    REVIEW OF SYSTEMS:    as above    Vital Signs Last 24 Hrs  T(C): 36.7 (30 Jul 2021 10:29), Max: 37.4 (29 Jul 2021 23:29)  T(F): 98 (30 Jul 2021 10:29), Max: 99.3 (29 Jul 2021 23:29)  HR: 101 (30 Jul 2021 10:29) (90 - 106)  BP: 117/74 (30 Jul 2021 10:29) (107/61 - 166/83)  BP(mean): --  RR: 18 (30 Jul 2021 10:29) (15 - 18)  SpO2: 96% (30 Jul 2021 10:29) (94% - 98%)    PHYSICAL EXAM-  GENERAL: Comfortable  HEAD:  Atraumatic, Normocephalic  EYES: EOMI, conjunctiva and sclera clear  MOUTH: tongue bite pat + no ulcers  NECK: Supple, No JVD  CHEST/LUNG: CTA bilaterally; No wheeze  HEART: Regular rate and rhythm; No murmurs, rubs, or gallops  ABDOMEN: Soft, Nontender, Nondistended; Bowel sounds present  EXTREMITIES:  2+ Peripheral Pulses, No clubbing, cyanosis, or edema  PSYCH: AAOx3  NEUROLOGY: non-focal  SKIN: No rashes or lesions    MEDICATIONS  (STANDING):  amLODIPine   Tablet 10 milliGRAM(s) Oral daily  cloBAZam 5 milliGRAM(s) Oral two times a day  diVALproex DR 1500 milliGRAM(s) Oral two times a day  hydrochlorothiazide 50 milliGRAM(s) Oral daily  lacosamide 150 milliGRAM(s) Oral <User Schedule>  lacosamide 200 milliGRAM(s) Oral <User Schedule>  lidocaine 2% Viscous 5 milliLiter(s) Swish and Spit four times a day  phenytoin   Capsule 100 milliGRAM(s) Oral daily  phenytoin   Capsule 200 milliGRAM(s) Oral at bedtime    MEDICATIONS  (PRN):  acetaminophen   Tablet .. 650 milliGRAM(s) Oral every 6 hours PRN Temp greater or equal to 38.5C (101.3F), Mild Pain (1 - 3)  aluminum hydroxide/magnesium hydroxide/simethicone Suspension 30 milliLiter(s) Oral every 4 hours PRN Dyspepsia  LORazepam   Injectable 1 milliGRAM(s) IV Push once PRN seizure  melatonin 3 milliGRAM(s) Oral at bedtime PRN Insomnia      LABS:                        16.2   9.87  )-----------( 222      ( 29 Jul 2021 18:41 )             50.4     07-30    134<L>  |  98  |  12.5  ----------------------------<  104<H>  3.8   |  21.0<L>  |  0.98    Ca    9.4      30 Jul 2021 06:33  Mg     1.9     07-30

## 2021-08-02 NOTE — PROGRESS NOTE ADULT - ASSESSMENT
The patient is a 26 year old male with a past medical history of hypertension and seizure disorder who was admitted to the hospital for 2 witnessed seizure episodes.   Seen by Dr Hills, Depakote level was low as patient ran out of his medications. EEG was done which was negative.     Assessment/Plan:    1. Recurrent seizure: Patient ran out of Depakote prior to admission  STAT Phenytoin and Valproic acid level; Spoke with Dr Hills  EEG negative  Continue Vimpat phenytoin and Depakote for now     2. Tongue laceration: Warm salt water gargles TID   Ice chips prn  Lidocaine viscous swish and spit before meals    3. Hypertension on Nifedipine    Discharge disposition: Discharge to emergency housing this afternoon pending Phenytoin and valproic acid levels  The patient is a 26 year old male with a past medical history of hypertension and seizure disorder who was admitted to the hospital for 2 witnessed seizure episodes.   Seen by Dr Hills, Depakote level was low as patient ran out of his medications. EEG was done which was negative.     Assessment/Plan:    1. Recurrent seizure: Patient ran out of Depakote prior to admission  STAT Phenytoin and Valproic acid level; Spoke with Dr Hills; Phenytoin level <2.6  Stat bolus dose of Phenytoin now   EEG negative  Continue Vimpat phenytoin and Depakote for now     2. Tongue laceration: Warm salt water gargles TID   Ice chips prn  Lidocaine viscous swish and spit before meals    3. Hypertension on Nifedipine    Discharge disposition: Discharge to emergency housing after dose of IV phenytoin

## 2021-08-02 NOTE — PROCEDURE NOTE - ADDITIONAL PROCEDURE DETAILS
sono-guided 18g IV secured. Good flash, flushes easily. Tourniquet removed, bed lowered, and sharps were disposed. Pt tolerated procedure well.

## 2021-08-04 LAB
DESMETHYL LACOSAMIDE: <0.5 UG/ML — SIGNIFICANT CHANGE UP
LACOSAMIDE (VIMPAT) RESULT: 2.1 UG/ML — SIGNIFICANT CHANGE UP (ref 1–10)

## 2021-08-30 ENCOUNTER — APPOINTMENT (OUTPATIENT)
Dept: NEUROLOGY | Facility: CLINIC | Age: 26
End: 2021-08-30

## 2021-09-02 ENCOUNTER — NON-APPOINTMENT (OUTPATIENT)
Age: 26
End: 2021-09-02

## 2021-09-02 ENCOUNTER — APPOINTMENT (OUTPATIENT)
Dept: NEUROLOGY | Facility: CLINIC | Age: 26
End: 2021-09-02
Payer: MEDICAID

## 2021-09-02 PROCEDURE — 95816 EEG AWAKE AND DROWSY: CPT

## 2021-09-02 PROCEDURE — 99072 ADDL SUPL MATRL&STAF TM PHE: CPT

## 2021-09-02 PROCEDURE — 93040 RHYTHM ECG WITH REPORT: CPT

## 2021-09-13 ENCOUNTER — EMERGENCY (EMERGENCY)
Facility: HOSPITAL | Age: 26
LOS: 1 days | Discharge: HOME | End: 2021-09-13
Attending: EMERGENCY MEDICINE
Payer: COMMERCIAL

## 2021-09-13 VITALS
HEIGHT: 78 IN | DIASTOLIC BLOOD PRESSURE: 96 MMHG | RESPIRATION RATE: 20 BRPM | WEIGHT: 259.93 LBS | SYSTOLIC BLOOD PRESSURE: 142 MMHG | HEART RATE: 76 BPM | TEMPERATURE: 99 F | OXYGEN SATURATION: 99 %

## 2021-09-13 PROCEDURE — 99281 EMR DPT VST MAYX REQ PHY/QHP: CPT

## 2021-09-13 PROCEDURE — 99283 EMERGENCY DEPT VISIT LOW MDM: CPT

## 2021-09-13 RX ORDER — LACOSAMIDE 50 MG/1
1 TABLET ORAL
Qty: 28 | Refills: 0
Start: 2021-09-13 | End: 2021-09-26

## 2021-09-13 NOTE — ED PROVIDER NOTE - NS ED ROS FT
Constitutional: no fever, no chills  Eyes: no vision changes  ENT: no nasal congestion, no sore throat  CV: no chest pain  Resp: no cough, no shortness of breath  GI: no abdominal pain, no vomiting, no diarrhea  : no dysuria  MSK: no joint pain  Skin: no rash  Neuro: no headache, no weakness, no paresthesias

## 2021-09-13 NOTE — ED PROVIDER NOTE - PATIENT PORTAL LINK FT
You can access the FollowMyHealth Patient Portal offered by [facility] by registering at the following website: [email or web address]. By joining [employer]’s FollowMyHealth portal, you will also be able to view your health information using other applications (apps) compatible with our system.

## 2021-09-13 NOTE — ED PROVIDER NOTE - CARE PROVIDER_API CALL
Lucas Hills)  EEGEpilepsy; Neurology  [address] [place] [address]  Phone: [phone]  Fax: [phone]  Follow Up Time:

## 2021-09-13 NOTE — ED PROVIDER NOTE - OBJECTIVE STATEMENT
26y M w/ hx HTN, seizures, presenting for medication refill.  Requesting refills of his amlodipine, depakote, dilantin and vimpat.  Last had seizure few weeks ago.  States that he currently does not have a primary care doctor or neurologist.  No other complaints at this time.

## 2021-09-13 NOTE — ED ADULT TRIAGE NOTE - CHIEF COMPLAINT QUOTE
Pt arrives to ED requesting  " all medications  refill" Pt is on seizure medications and b/p meds  . Last dose two days ago

## 2021-09-13 NOTE — ED PROVIDER NOTE - ATTENDING CONTRIBUTION TO CARE
The patient seen and examined    Medication Refill    I, Bryan Lincoln, performed the initial face to face bedside interview with this patient regarding history of present illness, review of symptoms and relevant past medical, social and family history.  I completed an independent physical examination.  I was the initial provider who evaluated this patient. I have signed out the follow up of any pending tests (i.e. labs, radiological studies) to the resident.  I have communicated the patient’s plan of care and disposition with the resident.

## 2021-09-14 PROBLEM — I10 ESSENTIAL (PRIMARY) HYPERTENSION: Chronic | Status: ACTIVE | Noted: 2021-09-13

## 2021-09-21 NOTE — ED PROVIDER NOTE - OBJECTIVE STATEMENT
"I had a seizure"  25 yo male with h/o epilepsy on multiple anti-seizure medications.  Pt states he was told he had a seizure.  Remembers being in a really hot room listening to a bunch of people (at [facility]).  Reports compliance with medications.  EMS stated he skipped his meds but pt states that he only told them that because he was hoping they would let him not come to the hospital.  Pt was just recently (a day ago) d/c'd from [facility].  No complaints otherwise.  Wants to get out of here so he can get back to [facility] for breakfast. "I had a seizure"  25 yo male with h/o epilepsy on multiple anti-seizure medications.  Pt states he was told he had a seizure.  Remembers being in a really hot room listening to a bunch of people (at [facility]).  Reports compliance with medications.  EMS stated he skipped his meds but pt states that he only told them that because he was hoping they would let him not come to the hospital.  Pt was just recently (a day ago) d/c'd from [facility].  Pt states he was at  from 9/27-10/9.   No complaints otherwise.  Wants to get out of here so he can get back to [facility] for breakfast. Itraconazole Counseling:  I discussed with the patient the risks of itraconazole including but not limited to liver damage, nausea/vomiting, neuropathy, and severe allergy.  The patient understands that this medication is best absorbed when taken with acidic beverages such as non-diet cola or ginger ale.  The patient understands that monitoring is required including baseline LFTs and repeat LFTs at intervals.  The patient understands that they are to contact us or the primary physician if concerning signs are noted.

## 2021-09-28 ENCOUNTER — EMERGENCY (EMERGENCY)
Facility: HOSPITAL | Age: 26
LOS: 1 days | Discharge: HOME | End: 2021-09-28
Attending: EMERGENCY MEDICINE
Payer: COMMERCIAL

## 2021-09-28 VITALS
DIASTOLIC BLOOD PRESSURE: 98 MMHG | HEART RATE: 84 BPM | WEIGHT: 283.07 LBS | SYSTOLIC BLOOD PRESSURE: 145 MMHG | OXYGEN SATURATION: 99 % | RESPIRATION RATE: 16 BRPM | TEMPERATURE: 99 F | HEIGHT: 78 IN

## 2021-09-28 PROCEDURE — 99283 EMERGENCY DEPT VISIT LOW MDM: CPT

## 2021-09-28 PROCEDURE — 99281 EMR DPT VST MAYX REQ PHY/QHP: CPT

## 2021-09-28 RX ORDER — DIVALPROEX SODIUM 500 MG/1
3 TABLET, DELAYED RELEASE ORAL
Qty: 180 | Refills: 0
Start: 2021-09-28 | End: 2021-10-27

## 2021-09-28 RX ORDER — LACOSAMIDE 50 MG/1
1 TABLET ORAL
Qty: 60 | Refills: 0
Start: 2021-09-28 | End: 2021-10-27

## 2021-09-28 RX ORDER — AMLODIPINE BESYLATE 2.5 MG/1
1 TABLET ORAL
Qty: 30 | Refills: 0
Start: 2021-09-28 | End: 2021-10-27

## 2021-09-28 NOTE — ED PROVIDER NOTE - PATIENT PORTAL LINK FT
You can access the FollowMyHealth Patient Portal offered by [facility] by registering at the following website: [email or web address]. By joining [employer]’s FollowMyHealth portal, you will also be able to view your health information using other applications (apps) compatible with our system.

## 2021-09-28 NOTE — ED PROVIDER NOTE - OBJECTIVE STATEMENT
25 yo male hx of seizure d/o and htn presents for med refill; patient was here 2 weeks ago and received rx for refills; was making appointment for f/u with new neurologist, appointment is now Oct 18th. [place]vere needs more meds to bridge until appointment. Currently feels well, no current subjective complaints

## 2021-10-04 NOTE — ED ADULT NURSE NOTE - OBJECTIVE STATEMENT
Assumed pt care @ 2350. Pt received sitting on stretcher in NAD. Pt AOx3 C/O having 4 seizures today. No meds were given. Family member states he vomited and wet himself during the seizure. Seizures began @ 1500 today and were every few hours. Denies hitting head. Pt states he takes his Keppra daily. Last seizure was about a month ago. Lungs CTA, RR even unlabored.  Denies Nausea, Vomiting, Diarrhea at this time. Skin warm, dry, color appropriate for age and race. 04-Oct-2021 10:31 04-Oct-2021

## 2021-10-18 ENCOUNTER — APPOINTMENT (OUTPATIENT)
Dept: NEUROLOGY | Facility: CLINIC | Age: 26
End: 2021-10-18
Payer: MEDICAID

## 2021-10-18 VITALS
TEMPERATURE: 97.2 F | BODY MASS INDEX: 31.82 KG/M2 | DIASTOLIC BLOOD PRESSURE: 84 MMHG | OXYGEN SATURATION: 97 % | WEIGHT: 275 LBS | HEIGHT: 78 IN | SYSTOLIC BLOOD PRESSURE: 131 MMHG | HEART RATE: 84 BPM

## 2021-10-18 DIAGNOSIS — Z02.82 ENCOUNTER FOR ADOPTION SERVICES: ICD-10-CM

## 2021-10-18 DIAGNOSIS — G40.802 OTHER EPILEPSY, NOT INTRACTABLE, W/OUT STATUS EPILEPTICUS: ICD-10-CM

## 2021-10-18 DIAGNOSIS — Z78.9 OTHER SPECIFIED HEALTH STATUS: ICD-10-CM

## 2021-10-18 PROCEDURE — 99215 OFFICE O/P EST HI 40 MIN: CPT

## 2021-10-18 PROCEDURE — 99072 ADDL SUPL MATRL&STAF TM PHE: CPT

## 2021-10-19 PROBLEM — Z78.9 DOES NOT USE ILLICIT DRUGS: Status: ACTIVE | Noted: 2021-10-18

## 2021-10-19 PROBLEM — Z02.82 ADOPTED: Status: ACTIVE | Noted: 2021-10-19

## 2021-10-19 NOTE — ASSESSMENT
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 MRI with questionable R MTS. EEG with L CT > RT SWDs. Personally reviewed all images, labs, EEG and other studies. \par \par Admit to Epilepsy Monitoring Unit (EMU) to rapidly optimize AED and better characterize seizures. VPA and PHT levels are unstable d/t drug-drug interaction. Repeat MRI brain with 3T. Patient was educated regarding risks and driving privileges associated with the [place] State Guidelines; patient instructed not to drive. All questions and concerns answered and addressed in detail to patient's complete satisfaction. Patient verbalized understanding and agreed to plan.\par \par \par - Admit to EMU 10/25. The purposes of the EMU admission are 1) to safely and rapidly titrate antiepilepsy medications and 2) better characterize seizures. The expected length of stay is 3-4 days.\par => taper off PHT, start CNB, increase LCM to 200mg BID\par \par - continue VPA DR 1500mg BID, LCM 150mg BID, PHT 200mg BID for now\par - MRI brain w/o, epilepsy protocol\par - no driving \par - f/u in 1 month\par \par \par All relevant epilepsy AAN quality care measures were addressed and discussed with the patient.\par \par More than 50% of time spent counseling and educating patient about epilepsy specific safety issues including AED side effects and interactions, alcohol consumption, sleep deprivation, risks and driving privileges associated with the [place] Guidelines, death related to seizures/SUDEP, seizure 1st aid and risks. Patient is educated on seizure precautions, including no driving, no operating machinery, no swimming or bathing, no climbing heights, or engage in any risky activities during which a seizure could cause further injury to pt or others. \par
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
## 2021-10-20 ENCOUNTER — APPOINTMENT (OUTPATIENT)
Dept: FAMILY MEDICINE | Facility: CLINIC | Age: 26
End: 2021-10-20
Payer: MEDICAID

## 2021-10-20 ENCOUNTER — LABORATORY RESULT (OUTPATIENT)
Age: 26
End: 2021-10-20

## 2021-10-20 VITALS
BODY MASS INDEX: 31.82 KG/M2 | DIASTOLIC BLOOD PRESSURE: 87 MMHG | SYSTOLIC BLOOD PRESSURE: 133 MMHG | HEART RATE: 75 BPM | WEIGHT: 275 LBS | HEIGHT: 78 IN

## 2021-10-20 DIAGNOSIS — R73.09 OTHER ABNORMAL GLUCOSE: ICD-10-CM

## 2021-10-20 DIAGNOSIS — Z11.4 ENCOUNTER FOR SCREENING FOR HUMAN IMMUNODEFICIENCY VIRUS [HIV]: ICD-10-CM

## 2021-10-20 DIAGNOSIS — Z13.220 ENCOUNTER FOR SCREENING FOR LIPOID DISORDERS: ICD-10-CM

## 2021-10-20 DIAGNOSIS — Z11.59 ENCOUNTER FOR SCREENING FOR OTHER VIRAL DISEASES: ICD-10-CM

## 2021-10-20 DIAGNOSIS — Z11.3 ENCOUNTER FOR SCREENING FOR INFECTIONS WITH A PREDOMINANTLY SEXUAL MODE OF TRANSMISSION: ICD-10-CM

## 2021-10-20 DIAGNOSIS — S91.301A UNSPECIFIED OPEN WOUND, RIGHT FOOT, INITIAL ENCOUNTER: ICD-10-CM

## 2021-10-20 DIAGNOSIS — Z01.818 ENCOUNTER FOR OTHER PREPROCEDURAL EXAMINATION: ICD-10-CM

## 2021-10-20 DIAGNOSIS — Z00.00 ENCOUNTER FOR GENERAL ADULT MEDICAL EXAMINATION W/OUT ABNORMAL FINDINGS: ICD-10-CM

## 2021-10-20 DIAGNOSIS — E66.9 OBESITY, UNSPECIFIED: ICD-10-CM

## 2021-10-20 DIAGNOSIS — Z13.29 ENCOUNTER FOR SCREENING FOR OTHER SUSPECTED ENDOCRINE DISORDER: ICD-10-CM

## 2021-10-20 DIAGNOSIS — F17.200 NICOTINE DEPENDENCE, UNSPECIFIED, UNCOMPLICATED: ICD-10-CM

## 2021-10-20 DIAGNOSIS — I10 ESSENTIAL (PRIMARY) HYPERTENSION: ICD-10-CM

## 2021-10-20 DIAGNOSIS — Z23 ENCOUNTER FOR IMMUNIZATION: ICD-10-CM

## 2021-10-20 PROCEDURE — 90686 IIV4 VACC NO PRSV 0.5 ML IM: CPT

## 2021-10-20 PROCEDURE — 99385 PREV VISIT NEW AGE 18-39: CPT | Mod: 25

## 2021-10-20 PROCEDURE — G0008: CPT

## 2021-10-20 PROCEDURE — 99072 ADDL SUPL MATRL&STAF TM PHE: CPT

## 2021-10-20 PROCEDURE — G0447 BEHAVIOR COUNSEL OBESITY 15M: CPT

## 2021-10-20 PROCEDURE — 99406 BEHAV CHNG SMOKING 3-10 MIN: CPT | Mod: 25

## 2021-10-20 PROCEDURE — 99203 OFFICE O/P NEW LOW 30 MIN: CPT | Mod: 25

## 2021-10-21 DIAGNOSIS — R74.8 ABNORMAL LEVELS OF OTHER SERUM ENZYMES: ICD-10-CM

## 2021-10-21 NOTE — ED ADULT NURSE NOTE - PMH
PRE-SEDATION ASSESSMENT    CONSENT  Risks, benefits, and alternatives have been discussed with the patient/patient representative, and patient/patient representative agrees to proceed: Yes    MEDICAL HISTORY  Significant medical/surgical history: Yes  Past Complications with Sedation/Anesthesia: No  Significant Family History: No  Smoking History: No  Alcohol/Drug abuse: No  Possible Pregnancy (LMP): No  Cardiac History: No  Respiratory History: No    PHYSICAL EXAM  History and Physical Reviewed: H&P completed today  Airway Risk History: No previous complications  Airway Anatomy : Class II  Heart : Normal  Lungs : Normal  LOC/Mental Status : Normal    OTHER FINDINGS  Reviewed current medications and allergies: Yes  Pertinent lab/diagnostic test reviewed: Yes    SEDATION RISK ASSESSMENT  Risk Status ASA: Class II - Normal patient with mild systemic disease  Plan for Sedation: Moderate Sedation  Indications for Procedure/Pre-Procedure Diagnosis and Planned Procedure: Colonoscopy, history of colon polyps    NARRATIVE FINDINGS     
Concussion    Seizures

## 2021-10-22 ENCOUNTER — APPOINTMENT (OUTPATIENT)
Dept: MRI IMAGING | Facility: CLINIC | Age: 26
End: 2021-10-22
Payer: MEDICAID

## 2021-10-22 ENCOUNTER — OUTPATIENT (OUTPATIENT)
Dept: OUTPATIENT SERVICES | Facility: HOSPITAL | Age: 26
LOS: 1 days | End: 2021-10-22
Payer: COMMERCIAL

## 2021-10-22 ENCOUNTER — APPOINTMENT (OUTPATIENT)
Dept: DISASTER EMERGENCY | Facility: CLINIC | Age: 26
End: 2021-10-22

## 2021-10-22 ENCOUNTER — NON-APPOINTMENT (OUTPATIENT)
Age: 26
End: 2021-10-22

## 2021-10-22 ENCOUNTER — RESULT REVIEW (OUTPATIENT)
Age: 26
End: 2021-10-22

## 2021-10-22 DIAGNOSIS — G40.802 OTHER EPILEPSY, NOT INTRACTABLE, WITHOUT STATUS EPILEPTICUS: ICD-10-CM

## 2021-10-22 LAB
C TRACH RRNA SPEC QL NAA+PROBE: NOT DETECTED
CHOLEST SERPL-MCNC: 292 MG/DL
ESTIMATED AVERAGE GLUCOSE: 105 MG/DL
HBA1C MFR BLD HPLC: 5.3 %
HCV AB SER QL: NONREACTIVE
HCV S/CO RATIO: 0.13 S/CO
HDLC SERPL-MCNC: 50 MG/DL
HIV1+2 AB SPEC QL IA.RAPID: NONREACTIVE
LDLC SERPL CALC-MCNC: 204 MG/DL
N GONORRHOEA RRNA SPEC QL NAA+PROBE: NOT DETECTED
NONHDLC SERPL-MCNC: 243 MG/DL
SOURCE AMPLIFICATION: NORMAL
T PALLIDUM AB SER QL IA: NEGATIVE
TRIGL SERPL-MCNC: 193 MG/DL
TSH SERPL-ACNC: 1.07 UIU/ML

## 2021-10-22 PROCEDURE — 70551 MRI BRAIN STEM W/O DYE: CPT | Mod: 26

## 2021-10-22 PROCEDURE — 70551 MRI BRAIN STEM W/O DYE: CPT

## 2021-10-22 NOTE — PHYSICAL EXAM
[No Acute Distress] : no acute distress [Well Nourished] : well nourished [Well Developed] : well developed [Well-Appearing] : well-appearing [Normal Sclera/Conjunctiva] : normal sclera/conjunctiva [PERRL] : pupils equal round and reactive to light [EOMI] : extraocular movements intact [Normal Outer Ear/Nose] : the outer ears and nose were normal in appearance [Normal Oropharynx] : the oropharynx was normal [No JVD] : no jugular venous distention [No Lymphadenopathy] : no lymphadenopathy [Supple] : supple [Thyroid Normal, No Nodules] : the thyroid was normal and there were no nodules present [No Respiratory Distress] : no respiratory distress  [No Accessory Muscle Use] : no accessory muscle use [Clear to Auscultation] : lungs were clear to auscultation bilaterally [Normal Rate] : normal rate  [Regular Rhythm] : with a regular rhythm [Normal S1, S2] : normal S1 and S2 [No Murmur] : no murmur heard [No Carotid Bruits] : no carotid bruits [No Abdominal Bruit] : a ~M bruit was not heard ~T in the abdomen [No Varicosities] : no varicosities [Pedal Pulses Present] : the pedal pulses are present [No Edema] : there was no peripheral edema [No Palpable Aorta] : no palpable aorta [No Extremity Clubbing/Cyanosis] : no extremity clubbing/cyanosis [Soft] : abdomen soft [Non Tender] : non-tender [Non-distended] : non-distended [No Masses] : no abdominal mass palpated [No HSM] : no HSM [Normal Bowel Sounds] : normal bowel sounds [Normal Posterior Cervical Nodes] : no posterior cervical lymphadenopathy [Normal Anterior Cervical Nodes] : no anterior cervical lymphadenopathy [No CVA Tenderness] : no CVA  tenderness [No Spinal Tenderness] : no spinal tenderness [No Joint Swelling] : no joint swelling [Grossly Normal Strength/Tone] : grossly normal strength/tone [No Rash] : no rash [Coordination Grossly Intact] : coordination grossly intact [No Focal Deficits] : no focal deficits [Normal Gait] : normal gait [Deep Tendon Reflexes (DTR)] : deep tendon reflexes were 2+ and symmetric [Normal Affect] : the affect was normal [Normal Insight/Judgement] : insight and judgment were intact [Normal Voice/Communication] : normal voice/communication [Urethral Meatus] : meatus normal [Penis Abnormality] : normal circumcised penis [Scrotum] : the scrotum was normal [Testes Tenderness] : no tenderness of the testes [Normal TMs] : both tympanic membranes were normal [Normal Nasal Mucosa] : the nasal mucosa was normal [de-identified] : missing a few dental pieces [FreeTextEntry1] : no inguinal hernia bilaterally [de-identified] : wound in right foot at the level of the head of first MTT healing properly, borders approached by stitches. Mild watery, clear secretion in one borer but no pus, bleeding, tenderness, warmth, erythema noted.

## 2021-10-22 NOTE — ADDENDUM
[FreeTextEntry1] : 12:49 PM10/22/2021 Test results from 10/20/2021 reviewed \par -HLD, will need statins due to LDL > 190 but WIll not start them at this time due to elevated CPK\par -CPK is 944, improving from 1385 on 09/07/2021 at [facility] as per records. Likely related to seizures. Would advise to increase his oral hydration\par Pending GC/chlam results\par Rest of labs unremarkable \par I called this patient to the number on record. No answer. I left a message with contact information \par Will ask staff to call the patient and if no answer, to send a letter to the patient's address\par

## 2021-10-22 NOTE — COUNSELING
[Risk of tobacco use and health benefits of smoking cessation discussed] : Risk of tobacco use and health benefits of smoking cessation discussed [Cessation strategies including cessation program discussed] : Cessation strategies including cessation program discussed [Use of nicotine replacement therapies and other medications discussed] : Use of nicotine replacement therapies and other medications discussed [Potential consequences of obesity discussed] : Potential consequences of obesity discussed [Benefits of weight loss discussed] : Benefits of weight loss discussed [Encouraged to increase physical activity] : Encouraged to increase physical activity [Good understanding] : Patient has a good understanding of disease, goals and obesity follow-up plan [Tobacco Use Cessation Intermediate Greater Than 3 Minutes Up to 10 Minutes] : Tobacco Use Cessation Intermediate Greater Than 3 Minutes Up to 10 Minutes [FreeTextEntry1] : 3 [FreeTextEntry4] : 15

## 2021-10-22 NOTE — HEALTH RISK ASSESSMENT
[] : Yes [20 or more] : 20 or more [No] : No [Yes] : In the past 12 months have you used drugs other than those required for medical reasons? Yes [0] : 1) Little interest or pleasure doing things: Not at all (0) [1] : 2) Feeling down, depressed, or hopeless for several days (1) [PHQ-2 Negative - No further assessment needed] : PHQ-2 Negative - No further assessment needed [HIV Test offered] : HIV Test offered [Hepatitis C test offered] : Hepatitis C test offered [Homeless] : homeless [Unemployed] : unemployed [Single] : single [Audit-CScore] : 0 [de-identified] : stopped using marijuana 2 months ago [[id number]] : 1 [Reports changes in vision] : Reports no changes in vision [Reports changes in dental health] : Reports no changes in dental health
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
## 2021-10-22 NOTE — HISTORY OF PRESENT ILLNESS
[FreeTextEntry1] : establish care [de-identified] : Mr. JETHRO SPANN is a pleasant 26 year old male with PMH of HTN, epilepsy following up with Dr Burleson, neuro-epilepsy, who comes in to the office to establish care. Patient lives in a [facility] and was incarcerated for 2 years, got out in july/2021. He had a breakthrough seizure on sep/2021, was at [facility].Has had football concussions in high school. FIrst seizure in college. As per Dr Burleson, he is scheduled to be admitted to the Epilepsy monitoring unit (EMU) on 10/25/2021, she has possible interactions with VPA and PHT that will be addressed by them. She also mentions that he is not allowed to drive for now. \par Patient is here for his amlodipine renewal and for CPE. Patient was in [facility] ED last week because of a left foot wound, he got stitches and the Tdap vaccine, was advised to have them removed 1 week later. I don't have those records. Denies bleeding, pain or any symptoms form his wound. No other complaints today

## 2021-10-23 LAB — SARS-COV-2 N GENE NPH QL NAA+PROBE: NOT DETECTED

## 2021-10-25 ENCOUNTER — INPATIENT (INPATIENT)
Facility: HOSPITAL | Age: 26
LOS: 1 days | Discharge: HOME | DRG: 101 | End: 2021-10-27
Attending: INTERNAL MEDICINE | Admitting: INTERNAL MEDICINE
Payer: COMMERCIAL

## 2021-10-25 VITALS
TEMPERATURE: 98 F | SYSTOLIC BLOOD PRESSURE: 127 MMHG | RESPIRATION RATE: 18 BRPM | OXYGEN SATURATION: 95 % | DIASTOLIC BLOOD PRESSURE: 87 MMHG | HEART RATE: 73 BPM

## 2021-10-25 DIAGNOSIS — G40.919 EPILEPSY, UNSPECIFIED, INTRACTABLE, WITHOUT STATUS EPILEPTICUS: ICD-10-CM

## 2021-10-25 LAB
ALBUMIN SERPL ELPH-MCNC: 4.3 G/DL — SIGNIFICANT CHANGE UP (ref 3.3–5.2)
ALP SERPL-CCNC: 68 U/L — SIGNIFICANT CHANGE UP (ref 40–120)
ALT FLD-CCNC: 30 U/L — SIGNIFICANT CHANGE UP
ANION GAP SERPL CALC-SCNC: 14 MMOL/L — SIGNIFICANT CHANGE UP (ref 5–17)
AST SERPL-CCNC: 20 U/L — SIGNIFICANT CHANGE UP
BILIRUB SERPL-MCNC: 0.3 MG/DL — LOW (ref 0.4–2)
BUN SERPL-MCNC: 8.7 MG/DL — SIGNIFICANT CHANGE UP (ref 8–20)
CALCIUM SERPL-MCNC: 9.3 MG/DL — SIGNIFICANT CHANGE UP (ref 8.6–10.2)
CHLORIDE SERPL-SCNC: 103 MMOL/L — SIGNIFICANT CHANGE UP (ref 98–107)
CO2 SERPL-SCNC: 23 MMOL/L — SIGNIFICANT CHANGE UP (ref 22–29)
CREAT SERPL-MCNC: 0.7 MG/DL — SIGNIFICANT CHANGE UP (ref 0.5–1.3)
GLUCOSE SERPL-MCNC: 88 MG/DL — SIGNIFICANT CHANGE UP (ref 70–99)
HCT VFR BLD CALC: 48.3 % — SIGNIFICANT CHANGE UP (ref 39–50)
HGB BLD-MCNC: 15.9 G/DL — SIGNIFICANT CHANGE UP (ref 13–17)
MCHC RBC-ENTMCNC: 27.8 PG — SIGNIFICANT CHANGE UP (ref 27–34)
MCHC RBC-ENTMCNC: 32.9 GM/DL — SIGNIFICANT CHANGE UP (ref 32–36)
MCV RBC AUTO: 84.4 FL — SIGNIFICANT CHANGE UP (ref 80–100)
PCP SPEC-MCNC: SIGNIFICANT CHANGE UP
PHENYTOIN FREE SERPL-MCNC: 6.4 UG/ML — LOW (ref 10–20)
PLATELET # BLD AUTO: 223 K/UL — SIGNIFICANT CHANGE UP (ref 150–400)
POTASSIUM SERPL-MCNC: 4.3 MMOL/L — SIGNIFICANT CHANGE UP (ref 3.5–5.3)
POTASSIUM SERPL-SCNC: 4.3 MMOL/L — SIGNIFICANT CHANGE UP (ref 3.5–5.3)
PROT SERPL-MCNC: 6.8 G/DL — SIGNIFICANT CHANGE UP (ref 6.6–8.7)
RBC # BLD: 5.72 M/UL — SIGNIFICANT CHANGE UP (ref 4.2–5.8)
RBC # FLD: 12.9 % — SIGNIFICANT CHANGE UP (ref 10.3–14.5)
SODIUM SERPL-SCNC: 140 MMOL/L — SIGNIFICANT CHANGE UP (ref 135–145)
VALPROATE SERPL-MCNC: 94.5 UG/ML — SIGNIFICANT CHANGE UP (ref 50–100)
WBC # BLD: 6.37 K/UL — SIGNIFICANT CHANGE UP (ref 3.8–10.5)
WBC # FLD AUTO: 6.37 K/UL — SIGNIFICANT CHANGE UP (ref 3.8–10.5)

## 2021-10-25 PROCEDURE — 99222 1ST HOSP IP/OBS MODERATE 55: CPT

## 2021-10-25 PROCEDURE — 95720 EEG PHY/QHP EA INCR W/VEEG: CPT

## 2021-10-25 PROCEDURE — 99254 IP/OBS CNSLTJ NEW/EST MOD 60: CPT

## 2021-10-25 RX ORDER — ONDANSETRON 8 MG/1
4 TABLET, FILM COATED ORAL EVERY 8 HOURS
Refills: 0 | Status: DISCONTINUED | OUTPATIENT
Start: 2021-10-25 | End: 2021-10-27

## 2021-10-25 RX ORDER — DIVALPROEX SODIUM 500 MG/1
1000 TABLET, DELAYED RELEASE ORAL
Refills: 0 | Status: DISCONTINUED | OUTPATIENT
Start: 2021-10-25 | End: 2021-10-27

## 2021-10-25 RX ORDER — AMLODIPINE BESYLATE 2.5 MG/1
10 TABLET ORAL DAILY
Refills: 0 | Status: DISCONTINUED | OUTPATIENT
Start: 2021-10-25 | End: 2021-10-27

## 2021-10-25 RX ORDER — DIVALPROEX SODIUM 500 MG/1
1500 TABLET, DELAYED RELEASE ORAL
Refills: 0 | Status: DISCONTINUED | OUTPATIENT
Start: 2021-10-25 | End: 2021-10-25

## 2021-10-25 RX ORDER — ENOXAPARIN SODIUM 100 MG/ML
40 INJECTION SUBCUTANEOUS DAILY
Refills: 0 | Status: DISCONTINUED | OUTPATIENT
Start: 2021-10-25 | End: 2021-10-27

## 2021-10-25 RX ORDER — LACOSAMIDE 50 MG/1
50 TABLET ORAL ONCE
Refills: 0 | Status: DISCONTINUED | OUTPATIENT
Start: 2021-10-25 | End: 2021-10-25

## 2021-10-25 RX ORDER — LANOLIN ALCOHOL/MO/W.PET/CERES
3 CREAM (GRAM) TOPICAL AT BEDTIME
Refills: 0 | Status: DISCONTINUED | OUTPATIENT
Start: 2021-10-25 | End: 2021-10-27

## 2021-10-25 RX ORDER — ACETAMINOPHEN 500 MG
650 TABLET ORAL EVERY 6 HOURS
Refills: 0 | Status: DISCONTINUED | OUTPATIENT
Start: 2021-10-25 | End: 2021-10-27

## 2021-10-25 RX ORDER — LACOSAMIDE 50 MG/1
200 TABLET ORAL
Refills: 0 | Status: DISCONTINUED | OUTPATIENT
Start: 2021-10-25 | End: 2021-10-27

## 2021-10-25 RX ADMIN — LACOSAMIDE 200 MILLIGRAM(S): 50 TABLET ORAL at 18:31

## 2021-10-25 RX ADMIN — ENOXAPARIN SODIUM 40 MILLIGRAM(S): 100 INJECTION SUBCUTANEOUS at 12:18

## 2021-10-25 RX ADMIN — LACOSAMIDE 50 MILLIGRAM(S): 50 TABLET ORAL at 12:18

## 2021-10-25 RX ADMIN — DIVALPROEX SODIUM 1000 MILLIGRAM(S): 500 TABLET, DELAYED RELEASE ORAL at 18:04

## 2021-10-25 RX ADMIN — Medication 100 MILLIGRAM(S): at 18:04
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
## 2021-10-25 NOTE — PATIENT PROFILE ADULT - NSPROEXTENSIONSOFSELF_GEN_A_NUR
Plan: Can try the purple top Cetaphil lotion - moisturizer with sunscreen \\nRecheck again in 2-3 mo Detail Level: [employer] eyeglasses

## 2021-10-25 NOTE — H&P ADULT - HISTORY OF PRESENT ILLNESS
The patient is a 26 year-old  male with a history of seizure disorder and hypertension   who presents to EMU to optimize AED and better characterize seizures.

## 2021-10-25 NOTE — CONSULT NOTE ADULT - ASSESSMENT
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 Personally reviewed all imagines, labs, EEG and other studies.      Recommendation:  - cvEEG  - taper off phenytoin 200mg BID: 200/100mg  10/25 -> 100/50mg  10/26  -> 50mg/DC  10/27  - increase lacosamide 150mg BID to 200mg BID (extra 50mg STAT if patient already took 150mg this morning)  - continue VPA DR 1500mg BID  - phenytoin and valproic acid levels on admission  - valproic acid trough level to be repeated 10/27 AM  - tele monitor  - bed rail up at all times  - bed rail padding  - OOB only with supervision and assist  - seizure precaution  - medical management and support care per primary team       Thank you for allowing Epilepsy to participate in the care of this patient.   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 Personally reviewed all imagines, labs, EEG and other studies.      Recommendation:  - cvEEG  - taper off phenytoin 200mg BID: 200/100mg  10/25 -> 50/50mg  10/26  -> 50mg/DC  10/27   - increase lacosamide 150mg BID to 200mg BID (extra 50mg STAT if patient already took 150mg this morning)  - continue VPA DR 1500mg BID (may need to lower dosage pending level)  - phenytoin and valproic acid levels on admission  - valproic acid trough level to be repeated 10/27 AM  - tele monitor  - bed rail up at all times  - bed rail padding  - OOB only with supervision and assist  - seizure precaution  - medical management and support care per primary team       Thank you for allowing Epilepsy to participate in the care of this patient.   ______________________  Lucas Hills MD   Director, Epilepsy/EMU - [facility]   Epilepsy Consult #: 83-EPILEPSY ([phone])

## 2021-10-25 NOTE — H&P ADULT - ASSESSMENT
The patient is a 26 year-old  male with a history of seizure disorder and hypertension   who presents to EMU to optimize AED and better characterize seizures.    Assessment/Plan:    1. Seizure disoder: cVEEG  Seizure precautions  Fall precautions  Ambulate with assistance  Medications ordered as per Dr Hills's recommendations  Lamictal increased to 200mg PO BID  Decrease phenyotin to /50  Valproic acid 1500mg PO BID  Check VPA and phenytoin levels    2. Hypertension continue  norvasc    VTE_ Lovenox subcut

## 2021-10-26 LAB
COVID-19 SPIKE DOMAIN AB INTERP: POSITIVE
COVID-19 SPIKE DOMAIN ANTIBODY RESULT: 16.6 U/ML — HIGH
SARS-COV-2 IGG+IGM SERPL QL IA: 16.6 U/ML — HIGH
SARS-COV-2 IGG+IGM SERPL QL IA: POSITIVE

## 2021-10-26 PROCEDURE — 95720 EEG PHY/QHP EA INCR W/VEEG: CPT

## 2021-10-26 PROCEDURE — 99232 SBSQ HOSP IP/OBS MODERATE 35: CPT

## 2021-10-26 PROCEDURE — 99233 SBSQ HOSP IP/OBS HIGH 50: CPT

## 2021-10-26 RX ORDER — PHENYTOIN SODIUM EXTENDED 100 MG
CAPSULE ORAL
Refills: 0 | Status: COMPLETED | COMMUNITY
End: 2021-10-26

## 2021-10-26 RX ADMIN — LACOSAMIDE 200 MILLIGRAM(S): 50 TABLET ORAL at 17:55

## 2021-10-26 RX ADMIN — Medication 50 MILLIGRAM(S): at 06:23

## 2021-10-26 RX ADMIN — DIVALPROEX SODIUM 1000 MILLIGRAM(S): 500 TABLET, DELAYED RELEASE ORAL at 17:55

## 2021-10-26 RX ADMIN — ENOXAPARIN SODIUM 40 MILLIGRAM(S): 100 INJECTION SUBCUTANEOUS at 12:08

## 2021-10-26 RX ADMIN — DIVALPROEX SODIUM 1000 MILLIGRAM(S): 500 TABLET, DELAYED RELEASE ORAL at 06:23

## 2021-10-26 RX ADMIN — LACOSAMIDE 200 MILLIGRAM(S): 50 TABLET ORAL at 06:23

## 2021-10-26 RX ADMIN — AMLODIPINE BESYLATE 10 MILLIGRAM(S): 2.5 TABLET ORAL at 06:23

## 2021-10-26 RX ADMIN — Medication 50 MILLIGRAM(S): at 17:55
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
## 2021-10-27 ENCOUNTER — TRANSCRIPTION ENCOUNTER (OUTPATIENT)
Age: 26
End: 2021-10-27

## 2021-10-27 VITALS
SYSTOLIC BLOOD PRESSURE: 115 MMHG | TEMPERATURE: 98 F | RESPIRATION RATE: 20 BRPM | DIASTOLIC BLOOD PRESSURE: 70 MMHG | OXYGEN SATURATION: 97 % | HEART RATE: 73 BPM

## 2021-10-27 LAB — VALPROATE SERPL-MCNC: 54.6 UG/ML — SIGNIFICANT CHANGE UP (ref 50–100)

## 2021-10-27 PROCEDURE — 95711 VEEG 2-12 HR UNMONITORED: CPT

## 2021-10-27 PROCEDURE — 95718 EEG PHYS/QHP 2-12 HR W/VEEG: CPT

## 2021-10-27 PROCEDURE — 80164 ASSAY DIPROPYLACETIC ACD TOT: CPT

## 2021-10-27 PROCEDURE — 86769 SARS-COV-2 COVID-19 ANTIBODY: CPT

## 2021-10-27 PROCEDURE — 95714 VEEG EA 12-26 HR UNMNTR: CPT

## 2021-10-27 PROCEDURE — 99238 HOSP IP/OBS DSCHRG MGMT 30/<: CPT

## 2021-10-27 PROCEDURE — 36415 COLL VENOUS BLD VENIPUNCTURE: CPT

## 2021-10-27 PROCEDURE — 99233 SBSQ HOSP IP/OBS HIGH 50: CPT

## 2021-10-27 PROCEDURE — 80185 ASSAY OF PHENYTOIN TOTAL: CPT

## 2021-10-27 PROCEDURE — 80307 DRUG TEST PRSMV CHEM ANLYZR: CPT

## 2021-10-27 PROCEDURE — 95700 EEG CONT REC W/VID EEG TECH: CPT

## 2021-10-27 PROCEDURE — 80053 COMPREHEN METABOLIC PANEL: CPT

## 2021-10-27 PROCEDURE — 85027 COMPLETE CBC AUTOMATED: CPT

## 2021-10-27 RX ORDER — LACOSAMIDE 50 MG/1
1 TABLET ORAL
Qty: 0 | Refills: 0 | DISCHARGE
Start: 2021-10-27

## 2021-10-27 RX ORDER — DIVALPROEX SODIUM 500 MG/1
2 TABLET, DELAYED RELEASE ORAL
Qty: 0 | Refills: 0 | DISCHARGE
Start: 2021-10-27

## 2021-10-27 RX ADMIN — LACOSAMIDE 200 MILLIGRAM(S): 50 TABLET ORAL at 05:58

## 2021-10-27 RX ADMIN — AMLODIPINE BESYLATE 10 MILLIGRAM(S): 2.5 TABLET ORAL at 05:59

## 2021-10-27 RX ADMIN — DIVALPROEX SODIUM 1000 MILLIGRAM(S): 500 TABLET, DELAYED RELEASE ORAL at 05:58

## 2021-10-27 RX ADMIN — Medication 50 MILLIGRAM(S): at 05:57

## 2021-10-27 NOTE — DISCHARGE NOTE NURSING/CASE MANAGEMENT/SOCIAL WORK - FLU SEASON?
Yes... Partially impaired: cannot see medication labels or newsprint, but can see obstacles in path, and the surrounding layout; can count fingers at arm's length
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
## 2021-10-27 NOTE — DISCHARGE NOTE PROVIDER - HOSPITAL COURSE
The patient is a 26 year-old  male with a history of seizure disorder and hypertension   who presents to EMU to optimize AED and better characterize seizures. Tapered off Phenytoin  Lamictal increased to 200mg BID, valproic acid decreaed to 1000mg BID. discharged home in stable condition to follow up with Dr Hills

## 2021-10-27 NOTE — DISCHARGE NOTE PROVIDER - NSDCQMCOGNITION_NEU_ALL_CORE
Pt arrived to [facility] ambulatory in no acute distress at 1115 for Taxol C5.  Assessment unremarkable except numbness and sensitivity in hands. R chest port accessed without issue and positive blood return noted.  Labs obtained, CBC. Visit Vitals /71 (BP 1 Location: Left arm, BP Patient Position: Sitting) Pulse 93 Temp 97.2 °F (36.2 °C) Resp 18 Ht 5' 5\" (1.651 m) Wt 67.2 kg (148 lb 1.6 oz) SpO2 100% BMI 24.65 kg/m² Recent Results (from the past 12 hour(s)) CBC WITH AUTOMATED DIFF Collection Time: 02/15/19 11:24 AM  
Result Value Ref Range WBC 1.2 (L) 3.6 - 11.0 K/uL  
 RBC 3.42 (L) 3.80 - 5.20 M/uL  
 HGB 10.9 (L) 11.5 - 16.0 g/dL HCT 32.8 (L) 35.0 - 47.0 % MCV 95.9 80.0 - 99.0 FL  
 MCH 31.9 26.0 - 34.0 PG  
 MCHC 33.2 30.0 - 36.5 g/dL  
 RDW 15.0 (H) 11.5 - 14.5 % PLATELET 871 678 - 220 K/uL MPV 10.3 8.9 - 12.9 FL  
 NRBC 0.0 0  WBC ABSOLUTE NRBC 0.00 0.00 - 0.01 K/uL NEUTROPHILS 82 (H) 32 - 75 % LYMPHOCYTES 12 12 - 49 % MONOCYTES 4 (L) 5 - 13 % EOSINOPHILS 0 0 - 7 % BASOPHILS 0 0 - 1 % IMMATURE GRANULOCYTES 2 (H) 0.0 - 0.5 % ABS. NEUTROPHILS 1.1 (L) 1.8 - 8.0 K/UL  
 ABS. LYMPHOCYTES 0.1 (L) 0.8 - 3.5 K/UL  
 ABS. MONOCYTES 0.0 0.0 - 1.0 K/UL  
 ABS. EOSINOPHILS 0.0 0.0 - 0.4 K/UL  
 ABS. BASOPHILS 0.0 0.0 - 0.1 K/UL  
 ABS. IMM. GRANS. 0.0 0.00 - 0.04 K/UL  
 DF AUTOMATED The following medications administered: 
Benadryl 50mg PO 
[email or web address] Pepcid 20mg IVP Decadron 10mg IVP Taxol 115mg IV over 1 hour Visit Vitals /82 Pulse 95 Temp 97.2 °F (36.2 °C) Resp 18 Ht 5' 5\" (1.651 m) Wt 67.2 kg (148 lb 1.6 oz) SpO2 100% BMI 24.65 kg/m² Pt tolerated treatment well. Port flushed per policy and de-accessed, 2x2 and tape placed.  Pt discharged ambulatory in no acute distress at 1440, accompanied by family. Next appointment 2/22/19 at 1100. No difficulties

## 2021-10-27 NOTE — DISCHARGE NOTE PROVIDER - CARE PROVIDER_API CALL
Lucas Hills)  EEGEpilepsy; Neurology  [address] [place] [address]  Phone: [phone]  Fax: [phone]  Follow Up Time: 1 week

## 2021-10-27 NOTE — PROGRESS NOTE ADULT - ASSESSMENT
The patient is a 26 year-old  male with a history of seizure disorder and hypertension   who presents to EMU to optimize AED and better characterize seizures.    Assessment/Plan:    1. Seizure disorder: cVEEG  Seizure precautions  Fall precautions  Ambulate with assistance  Medications ordered as per Dr Hills's recommendations  Lamictal increased to 200mg PO BID  Taper Phenytoin  Valproic acid 1500mg PO BID  Check VPA and phenytoin levels    2. Hypertension continue  norvasc    VTE_ Lovenox subcut     Discharge disposition: HOme in AM, SW consulted for emergency housing on discharge
The patient is a 26 year-old  male with a history of seizure disorder and hypertension   who presents to EMU to optimize AED and better characterize seizures.    Assessment/Plan:    1. Seizure disorder: cVEEG  Seizure precautions  Fall precautions  Ambulate with assistance  Medications ordered as per Dr Hills's recommendations  Lamictal increased to 200mg PO BID  tapered off Phenytoin  Valproic acid 1000mg PO BID      2. Hypertension continue  norvasc    VTE_ Lovenox subcut     Stable for discharge home today pending [facility] housing placement by ERYN
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 Personally reviewed all imagines, labs, EEG and other studies.    EEG records potential epileptogenic foci in the bitemporal regions.       Diagnosis on Discharge Note: epilepsy     Recommendation:  - cvEEG  - taper off phenytoin 200mg BID: 200/100mg  10/25 -> 50/50mg  10/26  -> 50mg/DC  10/27 (ordered)   - continue lacosamide 200mg BID   - continue VPA DR 1000mg BID  - valproic acid trough level to be repeated 10/27 AM  - tele monitor  - bed rail up at all times  - bed rail padding  - OOB only with supervision and assist  - seizure precaution  - anticipate discharge home tomorrow on lacosamide 200mg BID and divalproex sodium DR 1000mg BID (script sent to [facility])   - follow-up with me in clinic: [facility] ([phone]), [address] [place] [address]       Will continue to follow with you.   ______________________  Lucas Hills MD   Director, Epilepsy/EMU - [facility]   Epilepsy Consult #: [phone] ([phone]) 
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 Personally reviewed all imagines, labs, EEG and other studies.    EEG records potential epileptogenic foci in the bitemporal regions.       Diagnosis on Discharge Note: epilepsy     Recommendation:  - discontinue cvEEG  - off phenytoin   - continue lacosamide 200mg BID   - continue VPA DR 1000mg BID  - tele monitor  - bed rail up at all times  - bed rail padding  - OOB only with supervision and assist  - seizure precaution  - anticipate discharge home today on lacosamide 200mg BID and divalproex sodium DR 1000mg BID (script sent to [facility])   - follow-up with me in clinic: [facility] [place] ([phone]), [place] [address]       No acute intervention from Epilepsy at this time.  Please reconsult if needed.  ______________________  Lucas Hills MD   Director, Epilepsy/EMU - [facility]   Epilepsy Consult #: [phone] ([phone])

## 2021-10-27 NOTE — DISCHARGE NOTE NURSING/CASE MANAGEMENT/SOCIAL WORK - PATIENT PORTAL LINK FT
You can access the FollowMyHealth Patient Portal offered by [facility] by registering at the following website: [email or web address]. By joining [employer]’s FollowMyHealth portal, you will also be able to view your health information using other applications (apps) compatible with our system.

## 2021-10-27 NOTE — DISCHARGE NOTE PROVIDER - NSDCMRMEDTOKEN_GEN_ALL_CORE_FT
acetaminophen 325 mg oral tablet: 2 tab(s) orally every 6 hours, As needed, Temp greater or equal to 38.5C (101.3F), Mild Pain (1 - 3)  amLODIPine 10 mg oral tablet: 1 tab(s) orally once a day   Depakote 500 mg oral delayed release tablet: 2 tab(s) orally 2 times a day  lacosamide 200 mg oral tablet: 1 tab(s) orally 2 times a day

## 2021-10-27 NOTE — DISCHARGE NOTE PROVIDER - NSDCCPCAREPLAN_GEN_ALL_CORE_FT
PRINCIPAL DISCHARGE DIAGNOSIS  Diagnosis: Epilepsy  Assessment and Plan of Treatment: Lamictal increased to 200mg BID, valproic acid decreaed to 1000mg BID.  Follow up with Dr Hills in 1 week      SECONDARY DISCHARGE DIAGNOSES  Diagnosis: Hypertension  Assessment and Plan of Treatment: Continue norvasc

## 2021-10-29 ENCOUNTER — NON-APPOINTMENT (OUTPATIENT)
Age: 26
End: 2021-10-29

## 2021-11-05 NOTE — PATIENT PROFILE ADULT. - NSSUBSTANCEUSE_GEN_ALL_CORE_SD
Quality 130: Documentation Of Current Medications In The Medical Record: Current Medications Documented
Detail Level: Simple
street drug/inhalant/medication abuse

## 2021-11-08 ENCOUNTER — APPOINTMENT (OUTPATIENT)
Dept: FAMILY MEDICINE | Facility: CLINIC | Age: 26
End: 2021-11-08

## 2021-12-09 RX ORDER — AMLODIPINE BESYLATE 10 MG/1
10 TABLET ORAL
Qty: 30 | Refills: 0 | Status: ACTIVE | COMMUNITY
Start: 1900-01-01 | End: 1900-01-01

## 2021-12-09 NOTE — ED PROVIDER NOTE - EXTREMITY EXAM
no deformity, pain or tenderness, no restriction of movement Detail Level: Simple Instructions (Optional): SRT 20

## 2022-02-15 NOTE — PATIENT PROFILE ADULT. - SPIRITUAL CULTURAL, RELIGIOUS PRACTICES/VALUES, PROFILE
Isotretinoin Pregnancy And Lactation Text: This medication is Pregnancy Category X and is considered extremely dangerous during pregnancy. It is unknown if it is excreted in breast milk. Doxycycline Pregnancy And Lactation Text: This medication is Pregnancy Category D and not consider safe during pregnancy. It is also excreted in breast milk but is considered safe for shorter treatment courses. Dapsone Pregnancy And Lactation Text: This medication is Pregnancy Category C and is not considered safe during pregnancy or breast feeding. Winlevi Counseling:  I discussed with the patient the risks of topical clascoterone including but not limited to erythema, scaling, itching, and stinging. Patient voiced their understanding. Bactrim Pregnancy And Lactation Text: This medication is Pregnancy Category D and is known to cause fetal risk. It is also excreted in breast milk. Use Enhanced Medication Counseling?: No Tazorac Counseling:  Patient advised that medication is irritating and drying. Patient may need to apply sparingly and wash off after an hour before eventually leaving it on overnight. The patient verbalized understanding of the proper use and possible adverse effects of tazorac. All of the patient's questions and concerns were addressed. Topical Sulfur Applications Counseling: Topical Sulfur Counseling: Patient counseled that this medication may cause skin irritation or allergic reactions. In the event of skin irritation, the patient was advised to reduce the amount of the drug applied or use it less frequently. The patient verbalized understanding of the proper use and possible adverse effects of topical sulfur application. All of the patient's questions and concerns were addressed. Spironolactone Pregnancy And Lactation Text: This medication can cause feminization of the male fetus and should be avoided during pregnancy. The active metabolite is also found in breast milk. Minocycline Pregnancy And Lactation Text: This medication is Pregnancy Category D and not consider safe during pregnancy. It is also excreted in breast milk. Detail Level: Detailed Tazorac Pregnancy And Lactation Text: This medication is not safe during pregnancy. It is unknown if this medication is excreted in breast milk. Doxycycline Counseling:  Patient counseled regarding possible photosensitivity and increased risk for sunburn. Patient instructed to avoid sunlight, if possible. When exposed to sunlight, patients should wear protective clothing, sunglasses, and sunscreen. The patient was instructed to call the office immediately if the following severe adverse effects occur:  hearing changes, easy bruising/bleeding, severe headache, or vision changes. The patient verbalized understanding of the proper use and possible adverse effects of doxycycline. All of the patient's questions and concerns were addressed. Erythromycin Counseling:  I discussed with the patient the risks of erythromycin including but not limited to GI upset, allergic reaction, drug rash, diarrhea, increase in liver enzymes, and yeast infections. Benzoyl Peroxide Pregnancy And Lactation Text: This medication is Pregnancy Category C. It is unknown if benzoyl peroxide is excreted in breast milk. Birth Control Pills Counseling: Birth Control Pill Counseling: I discussed with the patient the potential side effects of OCPs including but not limited to increased risk of stroke, heart attack, thrombophlebitis, deep venous thrombosis, hepatic adenomas, breast changes, GI upset, headaches, and depression. The patient verbalized understanding of the proper use and possible adverse effects of OCPs. All of the patient's questions and concerns were addressed. Azithromycin Counseling:  I discussed with the patient the risks of azithromycin including but not limited to GI upset, allergic reaction, drug rash, diarrhea, and yeast infections. Topical Retinoid counseling:  Patient advised to apply a pea-sized amount only at bedtime and wait 30 minutes after washing their face before applying. If too drying, patient may add a non-comedogenic moisturizer. The patient verbalized understanding of the proper use and possible adverse effects of retinoids. All of the patient's questions and concerns were addressed. Tetracycline Counseling: Patient counseled regarding possible photosensitivity and increased risk for sunburn. Patient instructed to avoid sunlight, if possible. When exposed to sunlight, patients should wear protective clothing, sunglasses, and sunscreen. The patient was instructed to call the office immediately if the following severe adverse effects occur:  hearing changes, easy bruising/bleeding, severe headache, or vision changes. The patient verbalized understanding of the proper use and possible adverse effects of tetracycline. All of the patient's questions and concerns were addressed. Patient understands to avoid pregnancy while on therapy due to potential birth defects. Azithromycin Pregnancy And Lactation Text: This medication is considered safe during pregnancy and is also secreted in breast milk. Sarecycline Counseling: Patient advised regarding possible photosensitivity and discoloration of the teeth, skin, lips, tongue and gums. Patient instructed to avoid sunlight, if possible. When exposed to sunlight, patients should wear protective clothing, sunglasses, and sunscreen. The patient was instructed to call the office immediately if the following severe adverse effects occur:  hearing changes, easy bruising/bleeding, severe headache, or vision changes. The patient verbalized understanding of the proper use and possible adverse effects of sarecycline. All of the patient's questions and concerns were addressed. High Dose Vitamin A Counseling: Side effects reviewed, pt to contact office should one occur. Topical Sulfur Applications Pregnancy And Lactation Text: This medication is Pregnancy Category C and has an unknown safety profile during pregnancy. It is unknown if this topical medication is excreted in breast milk. Winlevi Pregnancy And Lactation Text: This medication is considered safe during pregnancy and breastfeeding. Erythromycin Pregnancy And Lactation Text: This medication is Pregnancy Category B and is considered safe during pregnancy. It is also excreted in breast milk. Azelaic Acid Counseling: Patient counseled that medicine may cause skin irritation and to avoid applying near the eyes. In the event of skin irritation, the patient was advised to reduce the amount of the drug applied or use it less frequently. The patient verbalized understanding of the proper use and possible adverse effects of azelaic acid. All of the patient's questions and concerns were addressed. Birth Control Pills Pregnancy And Lactation Text: This medication should be avoided if pregnant and for the first 30 days post-partum. Topical Clindamycin Counseling: Patient counseled that this medication may cause skin irritation or allergic reactions. In the event of skin irritation, the patient was advised to reduce the amount of the drug applied or use it less frequently. The patient verbalized understanding of the proper use and possible adverse effects of clindamycin. All of the patient's questions and concerns were addressed. High Dose Vitamin A Pregnancy And Lactation Text: High dose vitamin A therapy is contraindicated during pregnancy and breast feeding. Topical Retinoid Pregnancy And Lactation Text: This medication is Pregnancy Category C. It is unknown if this medication is excreted in breast milk. Dapsone Counseling: I discussed with the patient the risks of dapsone including but not limited to hemolytic anemia, agranulocytosis, rashes, methemoglobinemia, kidney failure, peripheral neuropathy, headaches, GI upset, and liver toxicity. Patients who start dapsone require monitoring including baseline LFTs and weekly CBCs for the first month, then every month thereafter. The patient verbalized understanding of the proper use and possible adverse effects of dapsone. All of the patient's questions and concerns were addressed. Bactrim Counseling:  I discussed with the patient the risks of sulfa antibiotics including but not limited to GI upset, allergic reaction, drug rash, diarrhea, dizziness, photosensitivity, and yeast infections. Rarely, more serious reactions can occur including but not limited to aplastic anemia, agranulocytosis, methemoglobinemia, blood dyscrasias, liver or kidney failure, lung infiltrates or desquamative/blistering drug rashes. Benzoyl Peroxide Counseling: Patient counseled that medicine may cause skin irritation and bleach clothing. In the event of skin irritation, the patient was advised to reduce the amount of the drug applied or use it less frequently. The patient verbalized understanding of the proper use and possible adverse effects of benzoyl peroxide. All of the patient's questions and concerns were addressed. Spironolactone Counseling: Patient advised regarding risks of diarrhea, abdominal pain, hyperkalemia, birth defects (for female patients), liver toxicity and renal toxicity. The patient may need blood work to monitor liver and kidney function and potassium levels while on therapy. The patient verbalized understanding of the proper use and possible adverse effects of spironolactone. All of the patient's questions and concerns were addressed. Minocycline Counseling: Patient advised regarding possible photosensitivity and discoloration of the teeth, skin, lips, tongue and gums. Patient instructed to avoid sunlight, if possible. When exposed to sunlight, patients should wear protective clothing, sunglasses, and sunscreen. The patient was instructed to call the office immediately if the following severe adverse effects occur:  hearing changes, easy bruising/bleeding, severe headache, or vision changes. The patient verbalized understanding of the proper use and possible adverse effects of minocycline. All of the patient's questions and concerns were addressed. Topical Clindamycin Pregnancy And Lactation Text: This medication is Pregnancy Category B and is considered safe during pregnancy. It is unknown if it is excreted in breast milk. Isotretinoin Counseling: Patient should get monthly blood tests, not donate blood, not drive at night if vision affected, not share medication, and not undergo elective surgery for 6 months after tx completed. Side effects reviewed, pt to contact office should one occur. Azelaic Acid Pregnancy And Lactation Text: This medication is considered safe during pregnancy and breast feeding. Detail Level: Zone none

## 2022-05-02 ENCOUNTER — APPOINTMENT (OUTPATIENT)
Dept: NEUROLOGY | Facility: CLINIC | Age: 27
End: 2022-05-02

## 2022-05-16 NOTE — DISCHARGE NOTE ADULT - HOSPITAL COURSE
Patient notes that bowel movements are regular and normal with character as soft stools. Patient had laparoscopic appendectomy by Dr. Owen Nunes on 5/08/2022. 21 yo male with PMH seizure disorder presenting with recurrent seizure. He was started on depakote prior to admission but given only 3d supply in november, believe seizure was due to him missing medications. He was aslo due to follow up with Dr. Sinha for refill of medications in office but was unable to make that appointment. His medications were clarified with [facility] and the patient and his wife, he is no longer on keppra, only on depakote 1000mg bid. Keppra was discontinued, Depakote continued as blood levels revealed an adequate level after being restarted on it in the hospital. He has done well in the hospital without recurrent seizures. He will have follow up meeting with neuro in office this week.    Of note, noted to have an elevated CPK, rising to 1622. Suspect injury due to seizure. Managed with IVF hydration. No complications noted, advised to follow up with PMD and to continue aggressive intake of fluids.     Also, noted to have mild asymptomatic uncontrolled HTN with SBP into the 160s. Suspect some elevation due to stress from seizure but even later on with BP in 140s noted that he could reach better control. His home norvasc increased from 2.5 to 5mg daily with advisement to see PMD for BP check.    All electrolyte abnormalities were monitored carefully and repleted as necessary during this hospitalization. At the time of discharge patient was hemodynamically stable and amenable to all terms of discharge. The patient has received verbal instructions from myself regarding discharge plans.     Length of Discharge: 45MIN  will prescribe all meds  dc home today.

## 2022-05-23 ENCOUNTER — APPOINTMENT (OUTPATIENT)
Dept: NEUROLOGY | Facility: CLINIC | Age: 27
End: 2022-05-23

## 2022-05-23 NOTE — ED ADULT NURSE NOTE - NSSISCREENINGQ4_ED_A_ED
Prescription for Mupirocin ointment 22g tube, apply intranasally to both nostrils BID x5 days pre-operatively or for a total of 10 doses; [facility] [phone] in [facility]. No

## 2022-06-01 ENCOUNTER — APPOINTMENT (OUTPATIENT)
Dept: NEUROLOGY | Facility: CLINIC | Age: 27
End: 2022-06-01

## 2022-06-02 ENCOUNTER — NON-APPOINTMENT (OUTPATIENT)
Age: 27
End: 2022-06-02

## 2022-06-05 RX ORDER — DIVALPROEX SODIUM 500 MG/1
500 TABLET, DELAYED RELEASE ORAL
Qty: 120 | Refills: 0 | Status: COMPLETED | COMMUNITY
End: 2022-06-05

## 2022-06-05 RX ORDER — LACOSAMIDE 200 MG/1
200 TABLET, FILM COATED ORAL TWICE DAILY
Qty: 60 | Refills: 0 | Status: COMPLETED | COMMUNITY
End: 2022-06-05

## 2022-06-13 ENCOUNTER — APPOINTMENT (OUTPATIENT)
Dept: NEUROLOGY | Facility: CLINIC | Age: 27
End: 2022-06-13
Payer: MEDICAID

## 2022-06-13 VITALS
OXYGEN SATURATION: 98 % | TEMPERATURE: 97.4 F | BODY MASS INDEX: 31.7 KG/M2 | HEART RATE: 50 BPM | WEIGHT: 274 LBS | SYSTOLIC BLOOD PRESSURE: 138 MMHG | DIASTOLIC BLOOD PRESSURE: 79 MMHG | HEIGHT: 78 IN

## 2022-06-13 DIAGNOSIS — Z51.81 ENCOUNTER FOR THERAPEUTIC DRUG LVL MONITORING: ICD-10-CM

## 2022-06-13 PROCEDURE — 99214 OFFICE O/P EST MOD 30 MIN: CPT

## 2022-06-13 RX ORDER — LEVETIRACETAM 1000 MG/1
1000 TABLET, FILM COATED ORAL TWICE DAILY
Qty: 60 | Refills: 2 | Status: COMPLETED | COMMUNITY
Start: 2022-06-02 | End: 2022-06-13

## 2022-06-14 NOTE — PHYSICAL EXAM
[FreeTextEntry1] : GENERAL PHYSICAL EXAM:\par GEN: no distress, normal affect\par HEENT: NCAT, OP clear\par EYES: sclera white, conjunctiva clear, no nystagmus\par NECK: supple\par CV: RRR    		\par PULM: CTAB, no wheezing\par GI: soft ABD, +BS, NT, ND\par EXT: peripheral pulse intact, no cyanosis\par MSK: muscle tone and strength normal\par SKIN: warm, dry, no rash or lesion on exposed skin \par \par NEUROLOGICAL EXAM:\par Mental Status\par Orientation: alert and oriented to person, place, time, and situation \par Language: clear and fluent\par \par Cranial Nerves\par II: full visual fields intact \par III, IV, VI: PERRL, EOMI\par V, VII: facial sensation and movement intact and symmetric \par VIII: hearing intact \par IX, X: uvula midline, soft palate elevates normally \par XI: BL shoulder shrug intact \par XII: tongue midline\par \par Motor\par Shoulder abd: 5 (R), 5 (L)\par EF/EE: 5 (R), 5 (L)\par WF/WE: 5 (R), 5 (L)\par hand : 5 (R), 5 (L)\par HF/HE: 5 (R), 5 (L)\par KF/KE: 5 (R), 5 (L)\par DF/PF: 5 (R), 5 (L)                \par Tone and bulk are normal in upper and lower limbs\par No pronator drift\par \par Sensation\par Intact to light touch and pinprick in all 4 EXTs\par \par Reflex\par 2+ in BL biceps, triceps, brachioradialis, patella, ankle                                    \par Plantar responses downward bilaterally\par \par Coordination\par Normal FTN bilaterally\par \par Gait\par Normal stance, stride, and pivot turn\par Tandem walk intact\par Negative Romberg\par \par
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
## 2022-06-14 NOTE — ED PROVIDER NOTE - MUSCULOSKELETAL, MLM
Moving all extremities spontaneously Sski Pregnancy And Lactation Text: This medication is Pregnancy Category D and isn't considered safe during pregnancy. It is excreted in breast milk.

## 2022-06-14 NOTE — ASSESSMENT
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 MRI with questionable R MTS. EEG with LT and RT SW. Personally reviewed all images, labs, EEG and other studies. \par \par Do not believe LEV 1gm BID is a therapeutic dosage for pt; will increase. If continues to have sz, start CNB. Pt is very hesitant to start a second ASM; prefers surgery over second ASM. All questions and concerns answered and addressed in detail to patient's complete satisfaction. Patient verbalized understanding and agreed to plan.\par \par \par - increase LEV IR 1000mg BID to ER 2250/1500mg (3 tab of 750mg and 2 tab of 750mg)\par - LEV trough level in 1.5 weeks\par - no driving \par - f/u in 3 months\par \par \par All relevant epilepsy AAN quality care measures were addressed and discussed with the patient.\par \par More than 50% of time spent counseling and educating patient about epilepsy specific safety issues including AED side effects and interactions, alcohol consumption, sleep deprivation, risks and driving privileges associated with the [place] Guidelines, death related to seizures/SUDEP, seizure 1st aid and risks. Patient is educated on seizure precautions, including no driving, no operating machinery, no swimming or bathing, no climbing heights, or engage in any risky activities during which a seizure could cause further injury to pt or others. \par

## 2022-06-27 ENCOUNTER — APPOINTMENT (OUTPATIENT)
Dept: NEUROLOGY | Facility: CLINIC | Age: 27
End: 2022-06-27

## 2022-06-27 ENCOUNTER — APPOINTMENT (OUTPATIENT)
Dept: FAMILY MEDICINE | Facility: CLINIC | Age: 27
End: 2022-06-27

## 2022-07-16 ENCOUNTER — EMERGENCY (EMERGENCY)
Facility: HOSPITAL | Age: 27
LOS: 1 days | Discharge: HOME | End: 2022-07-16
Attending: EMERGENCY MEDICINE
Payer: COMMERCIAL

## 2022-07-16 VITALS
SYSTOLIC BLOOD PRESSURE: 159 MMHG | OXYGEN SATURATION: 100 % | RESPIRATION RATE: 18 BRPM | TEMPERATURE: 101 F | HEART RATE: 58 BPM | DIASTOLIC BLOOD PRESSURE: 93 MMHG

## 2022-07-16 VITALS
SYSTOLIC BLOOD PRESSURE: 158 MMHG | TEMPERATURE: 99 F | DIASTOLIC BLOOD PRESSURE: 95 MMHG | HEIGHT: 78 IN | OXYGEN SATURATION: 99 % | HEART RATE: 70 BPM | RESPIRATION RATE: 18 BRPM

## 2022-07-16 LAB
ALBUMIN SERPL ELPH-MCNC: 5 G/DL — SIGNIFICANT CHANGE UP (ref 3.3–5.2)
ALP SERPL-CCNC: 77 U/L — SIGNIFICANT CHANGE UP (ref 40–120)
ALT FLD-CCNC: 54 U/L — HIGH
ANION GAP SERPL CALC-SCNC: 14 MMOL/L — SIGNIFICANT CHANGE UP (ref 5–17)
AST SERPL-CCNC: 68 U/L — HIGH
BASOPHILS # BLD AUTO: 0.01 K/UL — SIGNIFICANT CHANGE UP (ref 0–0.2)
BASOPHILS NFR BLD AUTO: 0.2 % — SIGNIFICANT CHANGE UP (ref 0–2)
BILIRUB SERPL-MCNC: 0.7 MG/DL — SIGNIFICANT CHANGE UP (ref 0.4–2)
BUN SERPL-MCNC: 8.8 MG/DL — SIGNIFICANT CHANGE UP (ref 8–20)
CALCIUM SERPL-MCNC: 10 MG/DL — SIGNIFICANT CHANGE UP (ref 8.6–10.2)
CHLORIDE SERPL-SCNC: 101 MMOL/L — SIGNIFICANT CHANGE UP (ref 98–107)
CO2 SERPL-SCNC: 25 MMOL/L — SIGNIFICANT CHANGE UP (ref 22–29)
CREAT SERPL-MCNC: 1.09 MG/DL — SIGNIFICANT CHANGE UP (ref 0.5–1.3)
EGFR: 96 ML/MIN/1.73M2 — SIGNIFICANT CHANGE UP
EOSINOPHIL # BLD AUTO: 0.01 K/UL — SIGNIFICANT CHANGE UP (ref 0–0.5)
EOSINOPHIL NFR BLD AUTO: 0.2 % — SIGNIFICANT CHANGE UP (ref 0–6)
GLUCOSE SERPL-MCNC: 95 MG/DL — SIGNIFICANT CHANGE UP (ref 70–99)
HCT VFR BLD CALC: 49.6 % — SIGNIFICANT CHANGE UP (ref 39–50)
HGB BLD-MCNC: 16.2 G/DL — SIGNIFICANT CHANGE UP (ref 13–17)
IMM GRANULOCYTES NFR BLD AUTO: 0.3 % — SIGNIFICANT CHANGE UP (ref 0–1.5)
LYMPHOCYTES # BLD AUTO: 0.83 K/UL — LOW (ref 1–3.3)
LYMPHOCYTES # BLD AUTO: 12.5 % — LOW (ref 13–44)
MAGNESIUM SERPL-MCNC: 1.7 MG/DL — SIGNIFICANT CHANGE UP (ref 1.6–2.6)
MCHC RBC-ENTMCNC: 27.1 PG — SIGNIFICANT CHANGE UP (ref 27–34)
MCHC RBC-ENTMCNC: 32.7 GM/DL — SIGNIFICANT CHANGE UP (ref 32–36)
MCV RBC AUTO: 82.9 FL — SIGNIFICANT CHANGE UP (ref 80–100)
MONOCYTES # BLD AUTO: 0.25 K/UL — SIGNIFICANT CHANGE UP (ref 0–0.9)
MONOCYTES NFR BLD AUTO: 3.8 % — SIGNIFICANT CHANGE UP (ref 2–14)
NEUTROPHILS # BLD AUTO: 5.52 K/UL — SIGNIFICANT CHANGE UP (ref 1.8–7.4)
NEUTROPHILS NFR BLD AUTO: 83 % — HIGH (ref 43–77)
PHOSPHATE SERPL-MCNC: 2.9 MG/DL — SIGNIFICANT CHANGE UP (ref 2.4–4.7)
PLATELET # BLD AUTO: 244 K/UL — SIGNIFICANT CHANGE UP (ref 150–400)
POTASSIUM SERPL-MCNC: 4.7 MMOL/L — SIGNIFICANT CHANGE UP (ref 3.5–5.3)
POTASSIUM SERPL-SCNC: 4.7 MMOL/L — SIGNIFICANT CHANGE UP (ref 3.5–5.3)
PROT SERPL-MCNC: 8.2 G/DL — SIGNIFICANT CHANGE UP (ref 6.6–8.7)
RAPID RVP RESULT: SIGNIFICANT CHANGE UP
RBC # BLD: 5.98 M/UL — HIGH (ref 4.2–5.8)
RBC # FLD: 12.8 % — SIGNIFICANT CHANGE UP (ref 10.3–14.5)
SARS-COV-2 RNA SPEC QL NAA+PROBE: SIGNIFICANT CHANGE UP
SODIUM SERPL-SCNC: 140 MMOL/L — SIGNIFICANT CHANGE UP (ref 135–145)
WBC # BLD: 6.64 K/UL — SIGNIFICANT CHANGE UP (ref 3.8–10.5)
WBC # FLD AUTO: 6.64 K/UL — SIGNIFICANT CHANGE UP (ref 3.8–10.5)

## 2022-07-16 PROCEDURE — 80053 COMPREHEN METABOLIC PANEL: CPT

## 2022-07-16 PROCEDURE — 99285 EMERGENCY DEPT VISIT HI MDM: CPT

## 2022-07-16 PROCEDURE — 99285 EMERGENCY DEPT VISIT HI MDM: CPT | Mod: 25

## 2022-07-16 PROCEDURE — 84100 ASSAY OF PHOSPHORUS: CPT

## 2022-07-16 PROCEDURE — 82962 GLUCOSE BLOOD TEST: CPT

## 2022-07-16 PROCEDURE — 70450 CT HEAD/BRAIN W/O DYE: CPT | Mod: MA

## 2022-07-16 PROCEDURE — 0225U NFCT DS DNA&RNA 21 SARSCOV2: CPT

## 2022-07-16 PROCEDURE — 83735 ASSAY OF MAGNESIUM: CPT

## 2022-07-16 PROCEDURE — 85025 COMPLETE CBC W/AUTO DIFF WBC: CPT

## 2022-07-16 PROCEDURE — 36415 COLL VENOUS BLD VENIPUNCTURE: CPT

## 2022-07-16 PROCEDURE — 96375 TX/PRO/DX INJ NEW DRUG ADDON: CPT

## 2022-07-16 PROCEDURE — 70450 CT HEAD/BRAIN W/O DYE: CPT | Mod: 26,MA

## 2022-07-16 PROCEDURE — 96374 THER/PROPH/DIAG INJ IV PUSH: CPT

## 2022-07-16 RX ORDER — KETOROLAC TROMETHAMINE 30 MG/ML
15 SYRINGE (ML) INJECTION ONCE
Refills: 0 | Status: DISCONTINUED | OUTPATIENT
Start: 2022-07-16 | End: 2022-07-16

## 2022-07-16 RX ORDER — ACETAMINOPHEN 500 MG
975 TABLET ORAL ONCE
Refills: 0 | Status: COMPLETED | OUTPATIENT
Start: 2022-07-16 | End: 2022-07-16

## 2022-07-16 RX ORDER — ACETAMINOPHEN 500 MG
650 TABLET ORAL ONCE
Refills: 0 | Status: DISCONTINUED | OUTPATIENT
Start: 2022-07-16 | End: 2022-07-20

## 2022-07-16 RX ORDER — LEVETIRACETAM 250 MG/1
1000 TABLET, FILM COATED ORAL ONCE
Refills: 0 | Status: COMPLETED | OUTPATIENT
Start: 2022-07-16 | End: 2022-07-16

## 2022-07-16 RX ORDER — PROCHLORPERAZINE MALEATE 5 MG
10 TABLET ORAL ONCE
Refills: 0 | Status: COMPLETED | OUTPATIENT
Start: 2022-07-16 | End: 2022-07-16

## 2022-07-16 RX ADMIN — Medication 10 MILLIGRAM(S): at 10:50

## 2022-07-16 RX ADMIN — Medication 975 MILLIGRAM(S): at 10:23

## 2022-07-16 RX ADMIN — LEVETIRACETAM 400 MILLIGRAM(S): 250 TABLET, FILM COATED ORAL at 10:23

## 2022-07-16 RX ADMIN — Medication 15 MILLIGRAM(S): at 11:47

## 2022-07-16 RX ADMIN — Medication 15 MILLIGRAM(S): at 10:23

## 2022-07-16 RX ADMIN — Medication 975 MILLIGRAM(S): at 11:47

## 2022-07-16 NOTE — ED PROVIDER NOTE - PROGRESS NOTE DETAILS
Workup is unremarkable for any emergent process. Patient did spike a fever while here but overall feeling better with HA and no nuchal rigidity on exam or any other findings. LAbs nml, no leukocytosis or electrolyte abnormalities. Tylenol given, rvp.  Patient is now feeling improved and wishes to leave.  Patient / family members have capacity.    Patient/family was given full return precautions, counseled on red flag symptoms such as LOC, fever, severe pain, or focal deficits and advised to return to the ED for these reasons or any reason that was concerning to them. Patient/family was informed of all significant and incidental findings found on this workup today and all results were reviewed.   All questions were answered, advised to make close follow up with their primary care provider and specialty clinics (as applicable) to follow up with this visit and continue investigation/treatment.   Patient/family has shown adequate understanding and is agreeable to the plan.

## 2022-07-16 NOTE — ED PROVIDER NOTE - NS ED ROS FT
General: No fever, no massive bleeding  Head: +HA, no ongoing scalp bleed  ENT: no neck pain, no sore throat  Resp: No SOB, no hemoptysis  Cardiovascular: No CP, No LOC  GI: No abdominal pain, No blood in stool  : No dysuria, no hematuria   MSK: No back pain, no limb pain  Skin: No painful or bleeding lesions  Neuro: No paresthesias, No focal deficits

## 2022-07-16 NOTE — ED ADULT TRIAGE NOTE - CHIEF COMPLAINT QUOTE
patient thinks he had seizure in his sleep woke up and had urinated on himself. c/o headache and chills. took antiseizure meds as prescribed. finger stick wnl.

## 2022-07-16 NOTE — ED PROVIDER NOTE - CARE PROVIDER_API CALL
[place])  EEGEpilepsy; Neurology  [address] [place] [address]  Phone: [phone]  Fax: [phone]  Follow Up Time: 4-6 Days

## 2022-07-16 NOTE — ED PROVIDER NOTE - OBJECTIVE STATEMENT
27 year-old  male with a history of seizure disorder and hypertension p/w concern for seizure in sleep.  Patient states he went to bed last night but woke up in his chair with severe HA and post-ictal sensation and had wetted himself.  Pt takes keppra and followed by Dr. Hills.  Otherwise denies pain, bleeding, SOB, chest pain, abdominal pain or fevers.  Denies other pertinent medical problems.  Denies any  substance use.

## 2022-07-16 NOTE — ED PROVIDER NOTE - CLINICAL SUMMARY MEDICAL DECISION MAKING FREE TEXT BOX
27 year-old  male with a history of seizure disorder and hypertension p/w concern for seizure in sleep. Patient given IV fluids and medication.   labs and imaging performed to evaluate the patient.

## 2022-07-16 NOTE — ED PROVIDER NOTE - PHYSICAL EXAMINATION
General: NAD, well appearing  HEENT: Normocephalic, atraumatic, PERRLA  Neck: No apparent stiffness or JVD  Pulm: Chest wall symmetric and nontender, lungs clear to ascultation   Cardiac: Regular rate and regular rhythm  Abdomen: Nontender and nondistended  Skin: Skin is warm, dry and intact without rashes or lesions.  Neuro: No motor or sensory deficits, CN intact  MSK: No deformity or tenderness

## 2022-07-16 NOTE — ED PROVIDER NOTE - PATIENT PORTAL LINK FT
Pt informed  Rancho MADRID     You can access the FollowMyHealth Patient Portal offered by [facility] by registering at the following website: [email or web address]. By joining [facility]’s FollowMyHealth portal, you will also be able to view your health information using other applications (apps) compatible with our system.

## 2022-07-16 NOTE — ED PROVIDER NOTE - ATTENDING CONTRIBUTION TO CARE
I, Bhargav Ayala, performed a face to face bedside interview with this patient regarding history of present illness, review of symptoms and relevant past medical, social and family history.  I completed an independent physical examination. I have communicated the patient’s plan of care and disposition with the resident.   27 year odl male with PMH seizure disorder presents with suspected seizure. pt states that he was asleep in his bed and awoke in the chair next to the bed feeling like he usually does when he is post-ictal. He reports nausea, headache and fatigue.  Gen: NAD, well appearing  CV: RRR  Pul: CTA b/l  Abd: Soft, non-distended, non-tender  Neuro: no focal deficits  msk: no nuchal rigidity  Pt improved, well appearing, neuro intact, pt with fever but I suspect the fever could have lowered the seizure threshold and no neck pain, stiffness or rigidity and headache resolved, no Sx to suggest meningitis,, stable for dc

## 2022-07-26 ENCOUNTER — APPOINTMENT (OUTPATIENT)
Dept: NEUROLOGY | Facility: CLINIC | Age: 27
End: 2022-07-26

## 2022-07-26 PROCEDURE — 99214 OFFICE O/P EST MOD 30 MIN: CPT | Mod: 95

## 2022-07-26 RX ORDER — LEVETIRACETAM 750 MG/1
750 TABLET, EXTENDED RELEASE ORAL
Qty: 120 | Refills: 3 | Status: COMPLETED | COMMUNITY
Start: 2022-06-13 | End: 2022-07-26

## 2022-08-01 NOTE — ASSESSMENT
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 MRI with questionable R MTS. EEG with LT and RT SW. Personally reviewed all images, labs, EEG and other studies. \par \par Doing well on LEV monotherapy. All questions and concerns answered and addressed in detail to patient's complete satisfaction. Patient verbalized understanding and agreed to plan.\par \par - continue LEV ER 1500/2250mg (2 tab of 750mg and 3 tab of 750mg)\par - no driving \par - f/u in 3 months: if remains sz free, decrease LEV to 1500mg BID\par \par \par All relevant epilepsy AAN quality care measures were addressed and discussed with the patient.\par \par More than 50% of time spent counseling and educating patient about epilepsy specific safety issues including AED side effects and interactions, alcohol consumption, sleep deprivation, risks and driving privileges associated with the [facility] Guidelines, death related to seizures/SUDEP, seizure 1st aid and risks. Patient is educated on seizure precautions, including no driving, no operating machinery, no swimming or bathing, no climbing heights, or engage in any risky activities during which a seizure could cause further injury to pt or others. \par \par \par Patient's identify verified. Patient consented to the teleservice as stated below. Verbal consent given on 07/26/2022 by patient, JETHRO SPANN.\par \par Informed Consent for Telehealth Services During a Public Health Emergency\par \par By virtue of my participation in this telehealth visit, I am consenting to receive care through telehealth. Telehealth is the use of electronic information and communication technologies by providers to deliver health care to patients at a distance.\par \par I understand that any care provided to me through [employer]s telehealth application (“the [employer]i”) will incorporate security protocols to protect the privacy and security of my health information. If any other application is used to provide care to me, I understand that the technology may not contain appropriate security protocols to protect the privacy and security of my health information. My provider has explained to me the risks associated with the technology platforms that he or she is using to provide care to me. I acknowledge that there are potential risks associated with any technology used while obtaining care through telehealth, including, but not limited to, connectively interruptions, other technical difficulties, and unauthorized access by a third party to one’s health information. Despite these risks, I agree to participate in the telehealth encounter.\par \par I understand and agree that I or my healthcare provider may terminate a telehealth encounter at any time in the event of a technical malfunction.\par \par I also understand that my location determines where medicine is being practiced. As a result, I will inform my provider where I am located at the time of my telehealth visit.\par \par I understand that there may be costs associated with a telehealth visit. I agree that I am responsible for any fees associated with the telehealth services that I receive.\par \par This Informed Consent for Telehealth Services During a Public Health Emergency will remain in effect solely during the term of the public health emergency.\par \par By signing below I certify that:\par \par I have read or had this form read and/or had this form explained to me;\par \par I fully understand the contents of this document, including the risks and benefits of receiving telehealth services; and\par \par I have been given ample opportunity to discuss any questions I may have regarding the telehealth services and that all of my questions have been answered to my satisfaction
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
## 2022-08-24 ENCOUNTER — APPOINTMENT (OUTPATIENT)
Dept: NEUROLOGY | Facility: CLINIC | Age: 27
End: 2022-08-24

## 2022-09-21 ENCOUNTER — NON-APPOINTMENT (OUTPATIENT)
Age: 27
End: 2022-09-21

## 2022-10-03 ENCOUNTER — EMERGENCY (EMERGENCY)
Facility: HOSPITAL | Age: 27
LOS: 1 days | Discharge: HOME | End: 2022-10-03
Attending: EMERGENCY MEDICINE
Payer: COMMERCIAL

## 2022-10-03 VITALS
TEMPERATURE: 99 F | DIASTOLIC BLOOD PRESSURE: 96 MMHG | OXYGEN SATURATION: 98 % | RESPIRATION RATE: 18 BRPM | HEART RATE: 80 BPM | SYSTOLIC BLOOD PRESSURE: 146 MMHG

## 2022-10-03 VITALS
DIASTOLIC BLOOD PRESSURE: 87 MMHG | OXYGEN SATURATION: 98 % | SYSTOLIC BLOOD PRESSURE: 137 MMHG | HEART RATE: 77 BPM | RESPIRATION RATE: 16 BRPM | HEIGHT: 78 IN | TEMPERATURE: 98 F

## 2022-10-03 LAB
ALBUMIN SERPL ELPH-MCNC: 4.7 G/DL — SIGNIFICANT CHANGE UP (ref 3.3–5.2)
ALP SERPL-CCNC: 79 U/L — SIGNIFICANT CHANGE UP (ref 40–120)
ALT FLD-CCNC: 31 U/L — SIGNIFICANT CHANGE UP
ANION GAP SERPL CALC-SCNC: 13 MMOL/L — SIGNIFICANT CHANGE UP (ref 5–17)
AST SERPL-CCNC: 31 U/L — SIGNIFICANT CHANGE UP
BILIRUB SERPL-MCNC: 0.8 MG/DL — SIGNIFICANT CHANGE UP (ref 0.4–2)
BUN SERPL-MCNC: 12.5 MG/DL — SIGNIFICANT CHANGE UP (ref 8–20)
CALCIUM SERPL-MCNC: 9.8 MG/DL — SIGNIFICANT CHANGE UP (ref 8.4–10.5)
CHLORIDE SERPL-SCNC: 97 MMOL/L — LOW (ref 98–107)
CO2 SERPL-SCNC: 27 MMOL/L — SIGNIFICANT CHANGE UP (ref 22–29)
CREAT SERPL-MCNC: 0.95 MG/DL — SIGNIFICANT CHANGE UP (ref 0.5–1.3)
EGFR: 113 ML/MIN/1.73M2 — SIGNIFICANT CHANGE UP
GLUCOSE SERPL-MCNC: 102 MG/DL — HIGH (ref 70–99)
HCT VFR BLD CALC: 50.7 % — HIGH (ref 39–50)
HGB BLD-MCNC: 16.7 G/DL — SIGNIFICANT CHANGE UP (ref 13–17)
MCHC RBC-ENTMCNC: 26.1 PG — LOW (ref 27–34)
MCHC RBC-ENTMCNC: 32.9 GM/DL — SIGNIFICANT CHANGE UP (ref 32–36)
MCV RBC AUTO: 79.1 FL — LOW (ref 80–100)
PLATELET # BLD AUTO: 240 K/UL — SIGNIFICANT CHANGE UP (ref 150–400)
POTASSIUM SERPL-MCNC: 3.6 MMOL/L — SIGNIFICANT CHANGE UP (ref 3.5–5.3)
POTASSIUM SERPL-SCNC: 3.6 MMOL/L — SIGNIFICANT CHANGE UP (ref 3.5–5.3)
PROT SERPL-MCNC: 7.9 G/DL — SIGNIFICANT CHANGE UP (ref 6.6–8.7)
RBC # BLD: 6.41 M/UL — HIGH (ref 4.2–5.8)
RBC # FLD: 12.7 % — SIGNIFICANT CHANGE UP (ref 10.3–14.5)
SODIUM SERPL-SCNC: 137 MMOL/L — SIGNIFICANT CHANGE UP (ref 135–145)
WBC # BLD: 10.44 K/UL — SIGNIFICANT CHANGE UP (ref 3.8–10.5)
WBC # FLD AUTO: 10.44 K/UL — SIGNIFICANT CHANGE UP (ref 3.8–10.5)

## 2022-10-03 PROCEDURE — 99284 EMERGENCY DEPT VISIT MOD MDM: CPT | Mod: 25

## 2022-10-03 PROCEDURE — 80053 COMPREHEN METABOLIC PANEL: CPT

## 2022-10-03 PROCEDURE — 96361 HYDRATE IV INFUSION ADD-ON: CPT

## 2022-10-03 PROCEDURE — 80177 DRUG SCRN QUAN LEVETIRACETAM: CPT

## 2022-10-03 PROCEDURE — 96374 THER/PROPH/DIAG INJ IV PUSH: CPT

## 2022-10-03 PROCEDURE — 85027 COMPLETE CBC AUTOMATED: CPT

## 2022-10-03 PROCEDURE — 99284 EMERGENCY DEPT VISIT MOD MDM: CPT

## 2022-10-03 PROCEDURE — 36415 COLL VENOUS BLD VENIPUNCTURE: CPT

## 2022-10-03 RX ORDER — SODIUM CHLORIDE 9 MG/ML
1000 INJECTION INTRAMUSCULAR; INTRAVENOUS; SUBCUTANEOUS ONCE
Refills: 0 | Status: COMPLETED | OUTPATIENT
Start: 2022-10-03 | End: 2022-10-03

## 2022-10-03 RX ADMIN — SODIUM CHLORIDE 1000 MILLILITER(S): 9 INJECTION INTRAMUSCULAR; INTRAVENOUS; SUBCUTANEOUS at 05:04

## 2022-10-03 RX ADMIN — SODIUM CHLORIDE 1000 MILLILITER(S): 9 INJECTION INTRAMUSCULAR; INTRAVENOUS; SUBCUTANEOUS at 04:07

## 2022-10-03 RX ADMIN — Medication 2 MILLIGRAM(S): at 03:01

## 2022-10-03 RX ADMIN — SODIUM CHLORIDE 1000 MILLILITER(S): 9 INJECTION INTRAMUSCULAR; INTRAVENOUS; SUBCUTANEOUS at 07:30

## 2022-10-03 RX ADMIN — SODIUM CHLORIDE 1000 MILLILITER(S): 9 INJECTION INTRAMUSCULAR; INTRAVENOUS; SUBCUTANEOUS at 03:07

## 2022-10-03 NOTE — ED ADULT NURSE REASSESSMENT NOTE - NS ED NURSE REASSESS COMMENT FT1
rcd handoff report from [facility] RN, pt in no apparent distress, calm and cooperative at this time, alert orientedx3 talking on cellphone, ambulatory, awaiting neuro consult with MD Hills

## 2022-10-03 NOTE — ED ADULT NURSE NOTE - OBJECTIVE STATEMENT
pt is a 27y male aox4, stable on feet.  pt states he has a hx of seizures and takes keppra 7505x a day.  pt states sp seizure.  pt states he woke up vomiting, with a headache and urinated on himself. pt has  post ictal aggression.  denies cp, sob, nausea, vomiting, diarrhea, abd pain, numbness, weakness, tingling

## 2022-10-03 NOTE — ED PROVIDER NOTE - ATTENDING CONTRIBUTION TO CARE
Lima: I performed a face to face bedside interview with patient regarding history of present illness, review of symptoms and past medical history. I completed an independent physical exam.  I have discussed patient's plan of care with resident.   I agree with note as stated above including HISTORY OF PRESENT ILLNESS, HIV, PAST MEDICAL/SURGICAL/FAMILY/SOCIAL HISTORY, ALLERGIES AND HOME MEDICATIONS, REVIEW OF SYSTEMS, PHYSICAL EXAM, MEDICAL DECISION MAKING and any PROGRESS NOTES during the time I functioned as the attending physician for this patient unless otherwise noted. My brief assessment is as follows: 27M h/o epilepsy on keppra 750mg (3 tabs in am, 2 in pm) presents s/p likely seizure. Woke up with urinary incontinence and headache, typical of prior seizures. Has not missed medication doses. Denies fever, CP, SOB, cough, weakness.   Gen: Well appearing in NAD  Head: NC/AT  Neck: trachea midline  Resp:  No distress  Ext: no deformities  Neuro:  A&O appears non focal  Skin:  Warm and dry as visualized  Psych:  Normal affect and mood   Patient well appearing, benign exam, offered keppra dose but patient declined. Plan for dose of ativan, labs, ivf, reassess.
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
## 2022-10-03 NOTE — ED ADULT TRIAGE NOTE - CHIEF COMPLAINT QUOTE
Pt. complaining of seizure today. Pt. states he urinated himself and vomited after. Pt. sees Dr. Hills for neurology. Pt. states he takes Keppra 750 x3 days. Pt. denies missing doses, but doesn't take them at the same time.

## 2022-10-03 NOTE — ED PROVIDER NOTE - NSFOLLOWUPINSTRUCTIONS_ED_ALL_ED_FT
You are advised to please follow up with your primary care doctor within the next 24 hours and return to the Emergency Department for worsening symptoms or any other concerns.  Your doctor may call [phone] to follow up on the specific results of the tests performed today in the emergency department.    Seizure    A seizure is abnormal electrical activity in the brain; the specific cause may or may not be found. Prior to a seizure you may experience a warning sensation (aura) that may include fear, nausea, dizziness, and visual changes such as flashing lights of spots. Common symptoms during the seizure may include an altered mental status, rhythmic jerking movements, drooling, grunting, loss of bladder or bowel control, or tongue biting. After a seizure, you may feel confused and sleepy.     Do not swim, drive, operate machinery, or engage in any risky activity during which a seizure could cause further injury to you or others. Teach friends and family what to do if you HAVE a seizure which includes laying you on the ground with your head on a cushion and turning you to the side to keep your breathing passages clear in case of vomiting.    SEEK IMMEDIATE MEDICAL CARE IF YOU HAVE ANY OF THE FOLLOWING SYMPTOMS: seizure lasting over 5 minutes, not waking up or persistent altered mental status after the seizure, or more frequent or worsening seizures.

## 2022-10-03 NOTE — ED PROVIDER NOTE - PATIENT PORTAL LINK FT
You can access the FollowMyHealth Patient Portal offered by [facility] by registering at the following website: [email or web address]. By joining [employer]’s FollowMyHealth portal, you will also be able to view your health information using other applications (apps) compatible with our system.

## 2022-10-03 NOTE — ED PROVIDER NOTE - CLINICAL SUMMARY MEDICAL DECISION MAKING FREE TEXT BOX
28 y/o M pt w/ pmhx of seizures presenting for seizure at home today. Pt does not want antiepileptic medication but will accept ativan and fluids. Will also check cbc and cmp, reassess.

## 2022-10-03 NOTE — ED PROVIDER NOTE - PROGRESS NOTE DETAILS
normal... Lima HAN: patient with no recurrent seizures, remains asymptomatic. Requesting to stay until the morning to speak to his neurologist (Dr Hills). Consult placed. MD Gee: patient continues to be stable and asymptomatic. notified that Epilepsy was paged twice and awaiting callback. patient would like to leave and states he will follow up outpatient.  awake, alert, ambulating with steady gait, tolerating PO. desires discharge.

## 2022-10-03 NOTE — ED ADULT NURSE REASSESSMENT NOTE - NS ED NURSE REASSESS COMMENT FT1
while in my care pt has had no seizure activity, has remained awake and alert oriented x4, ambulates with steady gait, tolerating PO given breakfast and lunch, awaiting neuro consult with MD Hills, multiple calls were placed to MD Hills pt starting to grow increasingly agitated with the wait time, pt agrees to be d/c'd by ED MD and will follow up with neuro in office    at this time an associate neurologist of MD Hills spoke with patient     per neuro and ED MD pt ok for d/c home, pt verbalized understanding of d/c instructions and follow up care

## 2022-10-03 NOTE — ED PROVIDER NOTE - PHYSICAL EXAMINATION
Gen: Well appearing in NAD  Head: NC/AT  Neck: trachea midline  Card: regular rate and rhythm  Resp:  CTAB  Abd: soft, non-distended, non-tender  Ext: no deformities  Neuro:  A&O appears non focal  Skin:  Warm and dry as visualized  Psych:  slightly agitated at times

## 2022-10-03 NOTE — ED PROVIDER NOTE - CARE PROVIDER_API CALL
Lucas Hills)  EEGEpilepsy; Neurology  [address], [address] [place] [address]  Phone: [phone]  Fax: [phone]  Follow Up Time: 1-3 Days

## 2022-10-05 ENCOUNTER — EMERGENCY (EMERGENCY)
Facility: HOSPITAL | Age: 27
LOS: 1 days | Discharge: HOME | End: 2022-10-05
Attending: EMERGENCY MEDICINE
Payer: COMMERCIAL

## 2022-10-05 VITALS
WEIGHT: 210.1 LBS | HEIGHT: 78 IN | SYSTOLIC BLOOD PRESSURE: 132 MMHG | HEART RATE: 74 BPM | RESPIRATION RATE: 20 BRPM | DIASTOLIC BLOOD PRESSURE: 86 MMHG | OXYGEN SATURATION: 98 % | TEMPERATURE: 99 F

## 2022-10-05 LAB
ALBUMIN SERPL ELPH-MCNC: 4.7 G/DL — SIGNIFICANT CHANGE UP (ref 3.3–5.2)
ALP SERPL-CCNC: 76 U/L — SIGNIFICANT CHANGE UP (ref 40–120)
ALT FLD-CCNC: 30 U/L — SIGNIFICANT CHANGE UP
ANION GAP SERPL CALC-SCNC: 14 MMOL/L — SIGNIFICANT CHANGE UP (ref 5–17)
AST SERPL-CCNC: 35 U/L — SIGNIFICANT CHANGE UP
BASOPHILS # BLD AUTO: 0.01 K/UL — SIGNIFICANT CHANGE UP (ref 0–0.2)
BASOPHILS NFR BLD AUTO: 0.2 % — SIGNIFICANT CHANGE UP (ref 0–2)
BILIRUB SERPL-MCNC: 1 MG/DL — SIGNIFICANT CHANGE UP (ref 0.4–2)
BUN SERPL-MCNC: 8.8 MG/DL — SIGNIFICANT CHANGE UP (ref 8–20)
CALCIUM SERPL-MCNC: 10.1 MG/DL — SIGNIFICANT CHANGE UP (ref 8.4–10.5)
CHLORIDE SERPL-SCNC: 99 MMOL/L — SIGNIFICANT CHANGE UP (ref 98–107)
CO2 SERPL-SCNC: 24 MMOL/L — SIGNIFICANT CHANGE UP (ref 22–29)
CREAT SERPL-MCNC: 0.87 MG/DL — SIGNIFICANT CHANGE UP (ref 0.5–1.3)
EGFR: 122 ML/MIN/1.73M2 — SIGNIFICANT CHANGE UP
EOSINOPHIL # BLD AUTO: 0 K/UL — SIGNIFICANT CHANGE UP (ref 0–0.5)
EOSINOPHIL NFR BLD AUTO: 0 % — SIGNIFICANT CHANGE UP (ref 0–6)
GLUCOSE SERPL-MCNC: 85 MG/DL — SIGNIFICANT CHANGE UP (ref 70–99)
HCT VFR BLD CALC: 51.8 % — HIGH (ref 39–50)
HGB BLD-MCNC: 16.8 G/DL — SIGNIFICANT CHANGE UP (ref 13–17)
IMM GRANULOCYTES NFR BLD AUTO: 0.2 % — SIGNIFICANT CHANGE UP (ref 0–0.9)
LEVETIRACETAM SERPL-MCNC: 19.2 UG/ML — SIGNIFICANT CHANGE UP (ref 10–40)
LYMPHOCYTES # BLD AUTO: 1.72 K/UL — SIGNIFICANT CHANGE UP (ref 1–3.3)
LYMPHOCYTES # BLD AUTO: 27.1 % — SIGNIFICANT CHANGE UP (ref 13–44)
MAGNESIUM SERPL-MCNC: 1.9 MG/DL — SIGNIFICANT CHANGE UP (ref 1.6–2.6)
MCHC RBC-ENTMCNC: 25.7 PG — LOW (ref 27–34)
MCHC RBC-ENTMCNC: 32.4 GM/DL — SIGNIFICANT CHANGE UP (ref 32–36)
MCV RBC AUTO: 79.2 FL — LOW (ref 80–100)
MONOCYTES # BLD AUTO: 0.39 K/UL — SIGNIFICANT CHANGE UP (ref 0–0.9)
MONOCYTES NFR BLD AUTO: 6.2 % — SIGNIFICANT CHANGE UP (ref 2–14)
NEUTROPHILS # BLD AUTO: 4.21 K/UL — SIGNIFICANT CHANGE UP (ref 1.8–7.4)
NEUTROPHILS NFR BLD AUTO: 66.3 % — SIGNIFICANT CHANGE UP (ref 43–77)
PLATELET # BLD AUTO: 240 K/UL — SIGNIFICANT CHANGE UP (ref 150–400)
POTASSIUM SERPL-MCNC: 4.4 MMOL/L — SIGNIFICANT CHANGE UP (ref 3.5–5.3)
POTASSIUM SERPL-SCNC: 4.4 MMOL/L — SIGNIFICANT CHANGE UP (ref 3.5–5.3)
PROT SERPL-MCNC: 8.1 G/DL — SIGNIFICANT CHANGE UP (ref 6.6–8.7)
RBC # BLD: 6.54 M/UL — HIGH (ref 4.2–5.8)
RBC # FLD: 12.9 % — SIGNIFICANT CHANGE UP (ref 10.3–14.5)
SODIUM SERPL-SCNC: 137 MMOL/L — SIGNIFICANT CHANGE UP (ref 135–145)
WBC # BLD: 6.34 K/UL — SIGNIFICANT CHANGE UP (ref 3.8–10.5)
WBC # FLD AUTO: 6.34 K/UL — SIGNIFICANT CHANGE UP (ref 3.8–10.5)

## 2022-10-05 PROCEDURE — 83735 ASSAY OF MAGNESIUM: CPT

## 2022-10-05 PROCEDURE — 36415 COLL VENOUS BLD VENIPUNCTURE: CPT

## 2022-10-05 PROCEDURE — 82962 GLUCOSE BLOOD TEST: CPT

## 2022-10-05 PROCEDURE — 85025 COMPLETE CBC W/AUTO DIFF WBC: CPT

## 2022-10-05 PROCEDURE — 80053 COMPREHEN METABOLIC PANEL: CPT

## 2022-10-05 PROCEDURE — 99244 OFF/OP CNSLTJ NEW/EST MOD 40: CPT

## 2022-10-05 PROCEDURE — 96374 THER/PROPH/DIAG INJ IV PUSH: CPT

## 2022-10-05 PROCEDURE — 99284 EMERGENCY DEPT VISIT MOD MDM: CPT

## 2022-10-05 PROCEDURE — C9254: CPT

## 2022-10-05 PROCEDURE — 99284 EMERGENCY DEPT VISIT MOD MDM: CPT | Mod: 25

## 2022-10-05 PROCEDURE — 80177 DRUG SCRN QUAN LEVETIRACETAM: CPT

## 2022-10-05 RX ORDER — LACOSAMIDE 50 MG/1
200 TABLET ORAL ONCE
Refills: 0 | Status: DISCONTINUED | OUTPATIENT
Start: 2022-10-05 | End: 2022-10-05

## 2022-10-05 RX ORDER — LEVETIRACETAM 250 MG/1
1000 TABLET, FILM COATED ORAL ONCE
Refills: 0 | Status: COMPLETED | OUTPATIENT
Start: 2022-10-05 | End: 2022-10-05

## 2022-10-05 RX ORDER — LACOSAMIDE 50 MG/1
1 TABLET ORAL
Qty: 15 | Refills: 0
Start: 2022-10-05 | End: 2022-10-09

## 2022-10-05 RX ORDER — LEVETIRACETAM 250 MG/1
1000 TABLET, FILM COATED ORAL ONCE
Refills: 0 | Status: DISCONTINUED | OUTPATIENT
Start: 2022-10-05 | End: 2022-10-05

## 2022-10-05 RX ORDER — LEVETIRACETAM 250 MG/1
1 TABLET, FILM COATED ORAL
Qty: 120 | Refills: 0
Start: 2022-10-05 | End: 2022-11-03

## 2022-10-05 RX ADMIN — LACOSAMIDE 140 MILLIGRAM(S): 50 TABLET ORAL at 09:56

## 2022-10-05 NOTE — ED ADULT TRIAGE NOTE - NS ED TRIAGE AVPU SCALE
Yes
Alert-The patient is alert, awake and responds to voice. The patient is oriented to time, place, and person. The triage nurse is able to obtain subjective information.

## 2022-10-05 NOTE — ED ADULT NURSE NOTE - OBJECTIVE STATEMENT
Pt comes in today stating he may have had a seizure while he was sleeping last night. Pt states he was lethargic when he woke up this morning. Does not appear postictal at this time. VSS. Uncooperative. Refusing blood work, IV and IV Keppra until he speaks with his neurologist. MD at the bedside explaining to patient that there is a call out to neuro. Pt states he would like the phone put on speakerphone when she calls back so he can hear what she is saying. Explained to patient that the calls go to a desk phone so that is not possible. He then requested to have the call recorded and played back to him so that he knows he's not being lied to. Pt advised to use his personal phone ot call her office to speak with her about his concerns. He states he does not have a phone because he lives in a shelter.

## 2022-10-05 NOTE — ED ADULT NURSE NOTE - PRO INTERPRETER NEED 2
Elvira hill  Phone: [phone]  Fax: (   )    -    Fam Piper (DP), Surgery  [facility]  [address] [place] [address]  Phone: [phone]  Fax: [phone] English

## 2022-10-05 NOTE — ED ADULT TRIAGE NOTE - CHIEF COMPLAINT QUOTE
C/o seizure. Pt states, "I have seizures in my sleep and I feel like I had one". +Lethargy. Answers questions appropriately. Hx of seizures (on Keppra).

## 2022-10-05 NOTE — ED PROVIDER NOTE - CARE PROVIDER_API CALL
Lucas Hills)  EEGEpilepsy; Neurology  [address] [place] [address]  Phone: [phone]  Fax: [phone]  Follow Up Time:
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
## 2022-10-05 NOTE — ED PROVIDER NOTE - CLINICAL SUMMARY MEDICAL DECISION MAKING FREE TEXT BOX
Pt with seizures liekly due to medication non-compliance. Pt already has vimpat prescription at [facility] that has not been picked up, advised to start and has f/u with Dr. Hills

## 2022-10-05 NOTE — ED PROVIDER NOTE - PATIENT PORTAL LINK FT
You can access the FollowMyHealth Patient Portal offered by [facility] by registering at the following website: [email or web address]. By joining [employer]’s FollowMyHealth portal, you will also be able to view your health information using other applications (apps) compatible with our system.

## 2022-10-05 NOTE — ED PROVIDER NOTE - OBJECTIVE STATEMENT
27 year old male with PMH seizure disorder presents with seizure. Pt states that he woke up in bed feeling like he had a seizure. He reports fatigue, mild confusion and general malaise. No headache, chest pain, fevers, chills. Pt reports intermittent compliance with his keppra, and recently had a second medication added to his seizure regimen b Dr Hills but has not started it yet.  As per [facility], pt just had his keppra dose increased and lacosamide added, but neither prescription has been picked up yet.

## 2022-10-07 ENCOUNTER — NON-APPOINTMENT (OUTPATIENT)
Age: 27
End: 2022-10-07

## 2022-10-07 LAB — LEVETIRACETAM SERPL-MCNC: 15.4 UG/ML — SIGNIFICANT CHANGE UP (ref 10–40)

## 2022-10-10 ENCOUNTER — EMERGENCY (EMERGENCY)
Facility: HOSPITAL | Age: 27
LOS: 1 days | Discharge: HOME | End: 2022-10-10
Attending: EMERGENCY MEDICINE
Payer: COMMERCIAL

## 2022-10-10 VITALS
HEART RATE: 69 BPM | DIASTOLIC BLOOD PRESSURE: 112 MMHG | RESPIRATION RATE: 16 BRPM | HEIGHT: 78 IN | OXYGEN SATURATION: 98 % | TEMPERATURE: 98 F | SYSTOLIC BLOOD PRESSURE: 151 MMHG

## 2022-10-10 VITALS
HEART RATE: 70 BPM | RESPIRATION RATE: 16 BRPM | OXYGEN SATURATION: 100 % | TEMPERATURE: 98 F | SYSTOLIC BLOOD PRESSURE: 137 MMHG | DIASTOLIC BLOOD PRESSURE: 90 MMHG

## 2022-10-10 LAB
ALBUMIN SERPL ELPH-MCNC: 4.1 G/DL — SIGNIFICANT CHANGE UP (ref 3.3–5.2)
ALP SERPL-CCNC: 62 U/L — SIGNIFICANT CHANGE UP (ref 40–120)
ALT FLD-CCNC: 39 U/L — SIGNIFICANT CHANGE UP
ANION GAP SERPL CALC-SCNC: 10 MMOL/L — SIGNIFICANT CHANGE UP (ref 5–17)
APTT BLD: 29.3 SEC — SIGNIFICANT CHANGE UP (ref 27.5–35.5)
AST SERPL-CCNC: 48 U/L — HIGH
BASOPHILS # BLD AUTO: 0.02 K/UL — SIGNIFICANT CHANGE UP (ref 0–0.2)
BASOPHILS NFR BLD AUTO: 0.4 % — SIGNIFICANT CHANGE UP (ref 0–2)
BILIRUB SERPL-MCNC: 0.4 MG/DL — SIGNIFICANT CHANGE UP (ref 0.4–2)
BUN SERPL-MCNC: 11.2 MG/DL — SIGNIFICANT CHANGE UP (ref 8–20)
CALCIUM SERPL-MCNC: 9.7 MG/DL — SIGNIFICANT CHANGE UP (ref 8.4–10.5)
CHLORIDE SERPL-SCNC: 106 MMOL/L — SIGNIFICANT CHANGE UP (ref 98–107)
CO2 SERPL-SCNC: 26 MMOL/L — SIGNIFICANT CHANGE UP (ref 22–29)
CREAT SERPL-MCNC: 0.84 MG/DL — SIGNIFICANT CHANGE UP (ref 0.5–1.3)
EGFR: 123 ML/MIN/1.73M2 — SIGNIFICANT CHANGE UP
EOSINOPHIL # BLD AUTO: 0.14 K/UL — SIGNIFICANT CHANGE UP (ref 0–0.5)
EOSINOPHIL NFR BLD AUTO: 2.5 % — SIGNIFICANT CHANGE UP (ref 0–6)
GLUCOSE SERPL-MCNC: 98 MG/DL — SIGNIFICANT CHANGE UP (ref 70–99)
HCT VFR BLD CALC: 48.5 % — SIGNIFICANT CHANGE UP (ref 39–50)
HGB BLD-MCNC: 15.7 G/DL — SIGNIFICANT CHANGE UP (ref 13–17)
IMM GRANULOCYTES NFR BLD AUTO: 0.2 % — SIGNIFICANT CHANGE UP (ref 0–0.9)
INR BLD: 1.11 RATIO — SIGNIFICANT CHANGE UP (ref 0.88–1.16)
LACTATE BLDV-MCNC: 1.1 MMOL/L — SIGNIFICANT CHANGE UP (ref 0.5–2)
LYMPHOCYTES # BLD AUTO: 2.48 K/UL — SIGNIFICANT CHANGE UP (ref 1–3.3)
LYMPHOCYTES # BLD AUTO: 43.5 % — SIGNIFICANT CHANGE UP (ref 13–44)
MCHC RBC-ENTMCNC: 25.8 PG — LOW (ref 27–34)
MCHC RBC-ENTMCNC: 32.4 GM/DL — SIGNIFICANT CHANGE UP (ref 32–36)
MCV RBC AUTO: 79.6 FL — LOW (ref 80–100)
MONOCYTES # BLD AUTO: 0.42 K/UL — SIGNIFICANT CHANGE UP (ref 0–0.9)
MONOCYTES NFR BLD AUTO: 7.4 % — SIGNIFICANT CHANGE UP (ref 2–14)
NEUTROPHILS # BLD AUTO: 2.63 K/UL — SIGNIFICANT CHANGE UP (ref 1.8–7.4)
NEUTROPHILS NFR BLD AUTO: 46 % — SIGNIFICANT CHANGE UP (ref 43–77)
PLATELET # BLD AUTO: 225 K/UL — SIGNIFICANT CHANGE UP (ref 150–400)
POTASSIUM SERPL-MCNC: 3.9 MMOL/L — SIGNIFICANT CHANGE UP (ref 3.5–5.3)
POTASSIUM SERPL-SCNC: 3.9 MMOL/L — SIGNIFICANT CHANGE UP (ref 3.5–5.3)
PROT SERPL-MCNC: 7.2 G/DL — SIGNIFICANT CHANGE UP (ref 6.6–8.7)
PROTHROM AB SERPL-ACNC: 12.9 SEC — SIGNIFICANT CHANGE UP (ref 10.5–13.4)
RBC # BLD: 6.09 M/UL — HIGH (ref 4.2–5.8)
RBC # FLD: 12.8 % — SIGNIFICANT CHANGE UP (ref 10.3–14.5)
SODIUM SERPL-SCNC: 142 MMOL/L — SIGNIFICANT CHANGE UP (ref 135–145)
WBC # BLD: 5.7 K/UL — SIGNIFICANT CHANGE UP (ref 3.8–10.5)
WBC # FLD AUTO: 5.7 K/UL — SIGNIFICANT CHANGE UP (ref 3.8–10.5)

## 2022-10-10 PROCEDURE — 85610 PROTHROMBIN TIME: CPT

## 2022-10-10 PROCEDURE — 80053 COMPREHEN METABOLIC PANEL: CPT

## 2022-10-10 PROCEDURE — 80177 DRUG SCRN QUAN LEVETIRACETAM: CPT

## 2022-10-10 PROCEDURE — 36415 COLL VENOUS BLD VENIPUNCTURE: CPT

## 2022-10-10 PROCEDURE — 85025 COMPLETE CBC W/AUTO DIFF WBC: CPT

## 2022-10-10 PROCEDURE — 85730 THROMBOPLASTIN TIME PARTIAL: CPT

## 2022-10-10 PROCEDURE — 83605 ASSAY OF LACTIC ACID: CPT

## 2022-10-10 PROCEDURE — 99284 EMERGENCY DEPT VISIT MOD MDM: CPT

## 2022-10-10 PROCEDURE — 99285 EMERGENCY DEPT VISIT HI MDM: CPT

## 2022-10-10 RX ORDER — LEVETIRACETAM 250 MG/1
3 TABLET, FILM COATED ORAL
Qty: 150 | Refills: 0
Start: 2022-10-10 | End: 2022-11-08

## 2022-10-10 RX ORDER — DIVALPROEX SODIUM 500 MG/1
2 TABLET, DELAYED RELEASE ORAL
Qty: 60 | Refills: 0
Start: 2022-10-10 | End: 2022-11-08

## 2022-10-10 RX ORDER — LACOSAMIDE 200 MG/1
200 TABLET ORAL TWICE DAILY
Qty: 60 | Refills: 5 | Status: COMPLETED | COMMUNITY
Start: 2022-10-04 | End: 2022-10-10

## 2022-10-10 NOTE — ED PROVIDER NOTE - PATIENT PORTAL LINK FT
You can access the FollowMyHealth Patient Portal offered by [facility] by registering at the following website: [email or web address]. By joining [employer]’s FollowMyHealth portal, you will also be able to view your health information using other applications (apps) compatible with our system.

## 2022-10-10 NOTE — ED PROVIDER NOTE - CLINICAL SUMMARY MEDICAL DECISION MAKING FREE TEXT BOX
Patient with history of epilepsy but no seizure activity in the last 48 hours. Patient recommended to take medications as recommended; keppra and Vimpat.

## 2022-10-10 NOTE — ED ADULT NURSE REASSESSMENT NOTE - NS ED NURSE REASSESS COMMENT FT1
Patient asleep in bed in no acute distress. Normal rise and fall of the chest noted. Patient is for re-evaluation this morning when awake. Safety precautions in place, nursing care ongoing.
Pt received @ 0730, sleeping, easily arousable, denies any pain.  Will continue to monitor.

## 2022-10-10 NOTE — ED ADULT NURSE NOTE - OBJECTIVE STATEMENT
Patient is a 27 year old male  with history of epilepsy, presented to the ED reporting that he wanted to be evaluated as he was having a lot of seizures lately.  Patient denied missing any dose of his medications. He is alert and appears to be in no acute distress.

## 2022-10-10 NOTE — ED PROVIDER NOTE - PRO INTERPRETER NEED 2
[address] [address]  Phone: [phone]  Fax: Beupangsstraman Alanis, APRN - NP        October 26, 2020     Patient: Mychal Raymond   YOB: 2004   Date of Visit: 10/26/2020       To Whom It May Concern: It is my medical opinion that Elia Fletcher can return to school on 10/27/20. If you have any questions or concerns, please don't hesitate to call.     Sincerely,        JOSE Charles - NP English

## 2022-10-10 NOTE — ED PROVIDER NOTE - OBJECTIVE STATEMENT
26 yo male with hx of TBI and seizures presents for re-evaluation of seizure and wanting to speak with Dr. LEONARD's team. Patient states he was discharged from [facility] two days ago after being in the hospital for a day or so after having multiple seizures. He has paperwork which states that he was given benadryl, versed, Haldol, Depacon, and Keppra. Patient has been stable but woke up feeling a little abnormal which concerned him as he was concerned that he might have a seizure. Patient states that he only takes Keppra and amlodipine.

## 2022-10-10 NOTE — ED PROVIDER NOTE - PROGRESS NOTE DETAILS
Patient does not recall being in the [facility] ED several days ago. Labs are as noted. Neurology consult as noted. Patient should be on Vimpat 150 bid which he has not been taking ( but has at his bedside). Juliana: received sign out, patient with recurrent seizures, historically there are questions about his compliance, but he states he is now compliant - taking 2250mg keprra in AM and 1500mg in PM; states he recently had hospital stay where depakote was added, and he thinks he needs to be on both. I discussed this with Dr. Hills who knows patient well, she is agreeable to this prescription regimen. Will send refills to [facility], otherwise OK for d/c, patient is awake and alert, calm and cooperative.

## 2022-10-10 NOTE — ED PROVIDER NOTE - NSFOLLOWUPINSTRUCTIONS_ED_ALL_ED_FT
1) Follow up with your doctor in 1-2 days  2) Return to the ER for worsening or concerning symptoms  3) take Lacosamide 100 mg twice a day in addition to your Keppra    Seizure, Adult      A seizure is a sudden burst of abnormal electrical and chemical activity in the brain. Seizures usually last from 30 seconds to 2 minutes. The abnormal activity temporarily interrupts normal brain function.    Many types of seizures can affect adults. A seizure can cause many different symptoms depending on where in the brain it starts.      What are the causes?    Common causes of this condition include:  •Fever or infection.      •Brain injury, head trauma, bleeding in the brain, or a brain tumor.      •Low levels of blood sugar or salt (sodium).      •Kidney problems or liver problems.      •Metabolic disorders or other conditions that are passed from parent to child (are inherited).      •Reaction to a substance, such as a drug or a medicine, or suddenly stopping the use of a substance (withdrawal).      •A stroke.      •Developmental disorders such as autism spectrum disorder or cerebral palsy.      In some cases, the cause of a seizure may not be known. Some people who have a seizure never have another one. A person who has repeated seizures over time without a clear cause has a condition called epilepsy.      What increases the risk?    You are more likely to develop this condition if:  •You have a family history of epilepsy.      •You have had a tonic–clonic seizure before. This type of seizure causes tightening (contraction) of the muscles of the whole body and loss of consciousness.      •You have a history of head trauma, lack of oxygen at birth, or strokes.        What are the signs or symptoms?    There are many different types of seizures. The symptoms vary depending on the type of seizure you have. Symptoms occur during the seizure. They may also occur before a seizure (aura) and after a seizure (postictal). Symptoms may include the following:    Symptoms during a seizure     •Uncontrollable shaking (convulsions) with fast, jerky movements of muscles.      •Stiffening of the body.      •Breathing problems.      •Confusion, staring, or unresponsiveness.      •Head nodding, eye blinking or fluttering, or rapid eye movements.      •Drooling, grunting, or making clicking sounds with your mouth.      •Loss of bladder control and bowel control.      Symptoms before a seizure     •Fear or anxiety.      •Nausea.    •Vertigo. This is a feeling like:  •You are moving when you are not.      •Your surroundings are moving when they are not.        •Déjà vu. This is a feeling of having seen or heard something before.      •Odd tastes or smells.      •Changes in vision, such as seeing flashing lights or spots.      Symptoms after a seizure     •Confusion.      •Sleepiness.      •Headache.      •Sore muscles.        How is this diagnosed?    This condition may be diagnosed based on:  •A description of your symptoms. Video of your seizures can be helpful.      •Your medical history.      •A physical exam.      You may also have tests, including:  •Blood tests.      •CT scan.      •MRI.      •Electroencephalogram (EEG). This test measures electrical activity in the brain. An EEG can predict whether seizures will return.      •A spinal tap, also called a lumbar puncture. This is the removal and testing of fluid that surrounds the brain and spinal cord.        How is this treated?    Most seizures will stop on their own in less than 5 minutes, and no treatment is needed. Seizures that last longer than 5 minutes will usually need treatment.    Seizures may be treated with:  •Medicines given through an IV.      •Avoiding known triggers, such as medicines that you take for another condition.      •Medicines to control seizures or prevent future seizures (antiepileptics), if epilepsy caused your seizures.      •Medical devices to prevent and control seizures.      •Surgery to stop seizures or to reduce how often seizures happen, if you have epilepsy that does not respond to medicines.      •A diet low in carbohydrates and high in fat (ketogenic diet).        Follow these instructions at home:    Medicines     •Take over-the-counter and prescription medicines only as told by your health care provider.      •Avoid any substances that may prevent your medicine from working properly, such as alcohol.      Activity     •Follow instructions about activities, such as driving or swimming, that would be dangerous if you had another seizure. Wait until your health care provider says it is safe to do them.      •If you live in the [place] check with your local department of motor vehicles (DMV) to find out about local driving laws. Each state has specific rules about when you can legally drive again.      •Get enough rest. Lack of sleep can make seizures more likely to occur.        Educating others    •Teach friends and family what to do if you have a seizure. They should:  •Help you get down to the ground, to prevent a fall.      •Cushion your head and move items away from your body.      •Loosen any tight clothing around your neck.      •Turn you on your side. If you vomit, this helps keep your airway clear.      •Know whether or not you need emergency care.      •Stay with you until you recover.      •Also, tell them what not to do if you have a seizure. Tell them:  •They should not hold you down. Holding you down will not stop the seizure.      •They should not put anything in your mouth.        General instructions     •Avoid anything that has ever triggered a seizure for you.      •Keep a seizure diary. Record what you remember about each seizure, especially anything that might have triggered it.      •Keep all follow-up visits. This is important.        Contact a health care provider if:    •You have another seizure or seizures. Call each time you have a seizure.      •Your seizure pattern changes.      •You continue to have seizures with treatment.      •You have symptoms of an infection or illness. Either of these might increase your risk of having a seizure.      •You are unable to take your medicine.        Get help right away if:  •You have:  •A seizure that does not stop after 5 minutes.      •Several seizures in a row without a complete recovery between seizures.      •A seizure that makes it harder to breathe.      •A seizure that leaves you unable to speak or use a part of your body.        •You do not wake up right away after a seizure.      •You injure yourself during a seizure.      •You have confusion or pain right after a seizure.      These symptoms may represent a serious problem that is an emergency. Do not wait to see if the symptoms will go away. Get medical help right away. Call your local emergency services (911 in the [place] Do not drive yourself to the hospital.       Summary    •Seizures are caused by abnormal electrical and chemical activity in the brain. The activity disrupts normal brain function and can cause various symptoms.      •Seizures have many causes, including illness, head injuries, low levels of blood sugar or salt, and certain conditions.      •Most seizures will stop on their own in less than 5 minutes. Seizures that last longer than 5 minutes are a medical emergency and need treatment right away.      •Many medicines are used to treat seizures. Take over-the-counter and prescription medicines only as told by your health care provider.      This information is not intended to replace advice given to you by your health care provider. Make sure you discuss any questions you have with your health care provider.

## 2022-10-10 NOTE — ED ADULT TRIAGE NOTE - GLASGOW COMA SCALE: BEST MOTOR RESPONSE, MLM
[facility] [facility] for the patient to undergo his MRI on Monday morning.  Per the MRI technician all necessary arrangements are made including [facility] Gisselle Vance AG-ACNP  [facility]  [address], [address]  [place] [address]    O: [phone]  P: [phone]    
(M6) obeys commands

## 2022-10-10 NOTE — ED ADULT TRIAGE NOTE - CHIEF COMPLAINT QUOTE
Pt. BIBA for mansoor of seizure. Pt. states hes been having multiple seizures lately. Pt. denies missing any doses of his medications.

## 2022-10-10 NOTE — ED PROVIDER NOTE - CARE PROVIDER_API CALL
Lucas Hills)  EEGEpilepsy; Neurology  [address] [place] [address]  Phone: [phone]  Fax: [phone]  Follow Up Time: 4-6 Days

## 2022-10-12 LAB — LEVETIRACETAM SERPL-MCNC: 42.9 UG/ML — HIGH (ref 10–40)

## 2022-10-21 ENCOUNTER — APPOINTMENT (OUTPATIENT)
Dept: NEUROLOGY | Facility: CLINIC | Age: 27
End: 2022-10-21

## 2022-10-21 VITALS
WEIGHT: 260 LBS | DIASTOLIC BLOOD PRESSURE: 79 MMHG | HEIGHT: 78 IN | BODY MASS INDEX: 30.08 KG/M2 | SYSTOLIC BLOOD PRESSURE: 128 MMHG

## 2022-10-21 PROCEDURE — 99214 OFFICE O/P EST MOD 30 MIN: CPT

## 2022-10-21 PROCEDURE — 99072 ADDL SUPL MATRL&STAF TM PHE: CPT

## 2022-10-21 RX ORDER — DIVALPROEX SODIUM 500 MG/1
500 TABLET, DELAYED RELEASE ORAL
Qty: 60 | Refills: 2 | Status: ACTIVE | COMMUNITY
Start: 2022-10-21 | End: 1900-01-01

## 2022-10-21 RX ORDER — LEVETIRACETAM 750 MG/1
750 TABLET, EXTENDED RELEASE ORAL
Qty: 120 | Refills: 2 | Status: ACTIVE | COMMUNITY
Start: 2022-07-26 | End: 1900-01-01
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
## 2022-10-25 NOTE — ASSESSMENT
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 EEG with LT and RT SW. Personally reviewed all images, labs, EEG and other studies. \par \par Recent seizures in setting of low serum ASM level, but pt states he is compliant. All questions and concerns answered and addressed in detail to patient's complete satisfaction. Patient verbalized understanding and agreed to plan.\par \par - continue LEV /2250mg\par - continue VPA DR 500mg BID\par - no driving \par - per pt's choice, will be transferring care to Dr. Connolly\par \par \par All relevant epilepsy AAN quality care measures were addressed and discussed with the patient.\par \par More than 50% of time spent counseling and educating patient about epilepsy specific safety issues including AED side effects and interactions, alcohol consumption, sleep deprivation, risks and driving privileges associated with the [place] Guidelines, death related to seizures/SUDEP, seizure 1st aid and risks. Patient is educated on seizure precautions, including no driving, no operating machinery, no swimming or bathing, no climbing heights, or engage in any risky activities during which a seizure could cause further injury to pt or others.

## 2022-10-25 NOTE — PHYSICAL EXAM
[FreeTextEntry1] : GENERAL PHYSICAL EXAM:\par GEN: no distress\par HEENT: NCAT, OP clear\par EYES: sclera white, conjunctiva clear, no nystagmus\par NECK: supple\par CV: RRR    		\par PULM: CTAB, no wheezing\par GI: soft ABD, +BS, NT, ND\par EXT: peripheral pulse intact, no cyanosis\par MSK: muscle tone and strength normal\par SKIN: warm, dry, no rash or lesion on exposed skin \par \par NEUROLOGICAL EXAM:\par Mental Status\par Orientation: awake and alert  \par Language: clear and fluent\par \par Cranial Nerves\par II: full visual fields intact \par III, IV, VI: PERRL, EOMI\par V, VII: facial sensation and movement intact and symmetric \par VIII: hearing intact \par IX, X: uvula midline, soft palate elevates normally \par XI: BL shoulder shrug intact \par XII: tongue midline\par \par Motor\par 5/5 in RUE, RLE\par 5/5 in LUE, LLE             \par Tone and bulk are normal in upper and lower limbs\par No pronator drift\par \par Sensation\par Intact to light touch and pinprick in all 4 EXTs\par \par Reflex\par 2+ in BL biceps, triceps, brachioradialis, patella, ankle                                    \par Plantar responses downward bilaterally\par \par Coordination\par Normal FTN bilaterally\par \par Gait\par Ambulates independently \par \par

## 2022-11-09 ENCOUNTER — APPOINTMENT (OUTPATIENT)
Dept: NEUROLOGY | Facility: CLINIC | Age: 27
End: 2022-11-09

## 2022-11-17 ENCOUNTER — EMERGENCY (EMERGENCY)
Facility: HOSPITAL | Age: 27
LOS: 1 days | Discharge: HOME | End: 2022-11-17
Attending: STUDENT IN AN ORGANIZED HEALTH CARE EDUCATION/TRAINING PROGRAM
Payer: COMMERCIAL

## 2022-11-17 VITALS
HEART RATE: 89 BPM | DIASTOLIC BLOOD PRESSURE: 84 MMHG | TEMPERATURE: 98 F | HEIGHT: 78 IN | OXYGEN SATURATION: 94 % | WEIGHT: 259.93 LBS | SYSTOLIC BLOOD PRESSURE: 137 MMHG | RESPIRATION RATE: 16 BRPM

## 2022-11-17 PROCEDURE — 99282 EMERGENCY DEPT VISIT SF MDM: CPT

## 2022-11-17 NOTE — ED PROVIDER NOTE - OBJECTIVE STATEMENT
26yo M w/pmh seizures on keppra presents for medical evaluation. Patient reports he "felt weird, like he might have had a seizure or going to have a seizure." Denies LOC, dizziness, fever, CP, n/v, abdominal pain. Reports the feeling "is hard to describe." Reports compliance with his keppra, reports he feels he is on too much seizure medication, no longer wants to see Dr. Hills, arranged follow-up with a different seizure specialist. Reports he eats one large meal per day and is concerned he should be eating more smaller meals. No medical complaints vocalized at this time.

## 2022-11-17 NOTE — ED PROVIDER NOTE - NS ED ROS FT
Constitutional: no fever, no sweats, and no chills.  CV: no chest pain, no edema.  Resp: no cough, no dyspnea  GI: no abdominal pain, no nausea and no vomiting.  MSK: no msk pain, no weakness  Skin: no lesions, and no rashes.  Neuro: no LOC, no headache, no dizziness  ROS otherwise negative except as noted in HPI.

## 2022-11-17 NOTE — ED ADULT NURSE NOTE - OBJECTIVE STATEMENT
pt ambulatory to B1R stating he feels like he is going to have a seizure. pt gcs15. AOx4. breathing even and unlabored. hx of seizures.

## 2022-11-17 NOTE — ED PROVIDER NOTE - PATIENT PORTAL LINK FT
You can access the FollowMyHealth Patient Portal offered by [facility] by registering at the following website: [email or web address]. By joining [employer]’s FollowMyHealth portal, you will also be able to view your health information using other applications (apps) compatible with our system. You can access the FollowMyHealth Patient Portal offered by [facility] by registering at the following website: [email or web address]. By joining [employer]’s FollowMyHealth portal, you will also be able to view your health information using other applications (apps) compatible with our system.

## 2022-11-17 NOTE — ED PROVIDER NOTE - ATTENDING CONTRIBUTION TO CARE
I personally saw the patient with the resident, and completed the key components of the history and physical exam. I then discussed the management plan with the resident.      28yo male with pmh of seizures on keppra (non compliant because some times it causes him to lose his appetite) presents for feeling like he is going to have a seizure. Pt did not have a seizure. Pt denies fevers/chills, ha, loc, focal neuro deficits, cp/sob/palp, cough, abd pain/n/v/d, urinary symptoms, recent travel.   Pt states he  no longer sees Dr. Hills following up with another epileptologist. Pt has full bottles of keppra with him encouraged to take and offered dose here but refused. pt neuro intact no medical complaints  stable for dc with follow up

## 2022-11-17 NOTE — ED ADULT NURSE REASSESSMENT NOTE - NS ED NURSE REASSESS COMMENT FT1
pt is up for discharge but pt refuses to leave. DC instructions given by MD. security called by MD to escort pt out.

## 2022-11-17 NOTE — ED PROVIDER NOTE - CLINICAL SUMMARY MEDICAL DECISION MAKING FREE TEXT BOX
28yo M w/pmh seizures on keppra presents for medical evaluation. No medical complaints, VSS, benign exam. Reassurance, d/c.

## 2022-11-17 NOTE — ED ADULT TRIAGE NOTE - CHIEF COMPLAINT QUOTE
Patient presents to ED from the shelter stating "I feel like I am going to have a seizure so I walked here to make sure that I don't." When asked what he feels at this time the patient said "it's hard to explain." Takes Keppra daily.

## 2022-11-17 NOTE — ED PROVIDER NOTE - NSFOLLOWUPINSTRUCTIONS_ED_ALL_ED_FT
1. Follow up with your doctor within 2-3 days.   2. Follow up with your seizure specialist at the next available appointment.   3. Return to the ED for any new or worsening symptoms, such as seizure, passing out, chest pain, shortness of breath.

## 2022-11-17 NOTE — ED ADULT NURSE NOTE - CCCP TRG CHIEF CMPLNT
see chief complaint quote Nasal Turnover Hinge Flap Text: The defect edges were debeveled with a #15 scalpel blade.  Given the size, depth, location of the defect and the defect being full thickness a nasal turnover hinge flap was deemed most appropriate.  Using a sterile surgical marker, an appropriate hinge flap was drawn incorporating the defect. The area thus outlined was incised with a #15 scalpel blade. The flap was designed to recreate the nasal mucosal lining and the alar rim. The skin margins were undermined to an appropriate distance in all directions utilizing iris scissors.

## 2022-11-22 ENCOUNTER — EMERGENCY (EMERGENCY)
Facility: HOSPITAL | Age: 27
LOS: 1 days | Discharge: HOME | End: 2022-11-22
Attending: EMERGENCY MEDICINE
Payer: COMMERCIAL

## 2022-11-22 VITALS
RESPIRATION RATE: 18 BRPM | DIASTOLIC BLOOD PRESSURE: 78 MMHG | TEMPERATURE: 98 F | OXYGEN SATURATION: 100 % | HEART RATE: 82 BPM | SYSTOLIC BLOOD PRESSURE: 144 MMHG

## 2022-11-22 VITALS
HEART RATE: 104 BPM | TEMPERATURE: 99 F | OXYGEN SATURATION: 100 % | HEIGHT: 78 IN | RESPIRATION RATE: 18 BRPM | SYSTOLIC BLOOD PRESSURE: 156 MMHG | DIASTOLIC BLOOD PRESSURE: 101 MMHG

## 2022-11-22 PROCEDURE — 99284 EMERGENCY DEPT VISIT MOD MDM: CPT

## 2022-11-22 RX ORDER — AMLODIPINE BESYLATE 2.5 MG/1
1 TABLET ORAL
Qty: 30 | Refills: 0
Start: 2022-11-22 | End: 2022-12-21

## 2022-11-22 RX ORDER — LEVETIRACETAM 250 MG/1
2 TABLET, FILM COATED ORAL
Qty: 120 | Refills: 0
Start: 2022-11-22 | End: 2022-12-21

## 2022-11-22 RX ORDER — VALPROIC ACID (AS SODIUM SALT) 250 MG/5ML
2 SOLUTION, ORAL ORAL
Qty: 120 | Refills: 0
Start: 2022-11-22 | End: 2022-12-21

## 2022-11-22 NOTE — ED PROVIDER NOTE - PATIENT PORTAL LINK FT
You can access the FollowMyHealth Patient Portal offered by [facility] by registering at the following website: [email or web address]. By joining [employer]’s FollowMyHealth portal, you will also be able to view your health information using other applications (apps) compatible with our system.

## 2022-11-22 NOTE — ED ADULT NURSE NOTE - FINAL NURSING ELECTRONIC SIGNATURE
Pt took approximately 25 Tramadol at around 0100. Pt states she feels numb and dizzy. Pt states she wanted to harm herself last night, but now is unsure.   
22-Nov-2022 08:54
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
## 2022-11-22 NOTE — ED PROVIDER NOTE - PHYSICAL EXAMINATION
Constitutional: Well appearing, awake, alert, oriented to person, place, and time/situation and in no apparent distress  ENMT: Airway patent nasal mucosa clear. Mouth with normal mucosa. Throat has no vesicles no oropharyngeal exudates and uvula is midline. No blood in the oropharynx  EYES: clear bilaterally, pupils equal, round and reactive to light  Cardiac: Regular rate, regular rhythm. Heart sounds S1, S2. No murmurs, rubs or gallops. Good capillary refill, 2+ pulses, no peripheral edema  Respiratory: Lungs CTAB, no use of accessory muscles, no crackles, satting 95% on RA in no distress  Gastrointestinal: Abdomen nondistended, non-tender, no rebound guarding or peritoneal signs  Genitourinary: No CVA tenderness, pelvis nontender, bladder nondistended  Musculoskeletal: Spine appears normal, range of motion is not limited, no muscle or joint tenderness  Neurological: Alert and oriented, no focal deficits, no motor or sensory deficits. CN 2-12 intact, PERRLA, EOMI, No FND  Skin: Skin normal color for race, warm, dry and intact, No evidence of rash

## 2022-11-22 NOTE — ED ADULT NURSE NOTE - CHIEF COMPLAINT
Detail Level: Zone Continue Regimen: Aklief qhs\\nAczone qam Plan: Discussed Aklief can be used every other day and applied after moisturizer The patient is a 27y Male complaining of seizures. Initiate Treatment: spironolactone 50 mg tablet Qd, Take po qd x 1 month.

## 2022-11-22 NOTE — ED ADULT NURSE NOTE - CAS EDP DISCH TYPE
Home Cyclosporine Counseling:  I discussed with the patient the risks of cyclosporine including but not limited to hypertension, gingival hyperplasia,myelosuppression, immunosuppression, liver damage, kidney damage, neurotoxicity, lymphoma, and serious infections. The patient understands that monitoring is required including baseline blood pressure, CBC, CMP, lipid panel and uric acid, and then 1-2 times monthly CMP and blood pressure.
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
## 2022-11-28 NOTE — ED PROVIDER NOTE - PSYCHIATRIC [+], MLM
Detail Level: Detailed
General Sunscreen Counseling: photoprotection and sunscreen
feels unsafe, paranoid

## 2022-12-09 ENCOUNTER — APPOINTMENT (OUTPATIENT)
Dept: NEUROLOGY | Facility: CLINIC | Age: 27
End: 2022-12-09

## 2023-01-18 NOTE — ED PROVIDER NOTE - CROS ED ENMT ALL NEG
[facility]  [address] [address]  [address]  : 1948    Chief Complaint   Patient presents with    Follow-up     Nocturia         HPI     Ailyn Tejada is a 69 y.o. male Here today referred by primary care physician due to ongoing prostate issues and prostate nodule. Patient is accompanied by his daughter who helps interpret. She tells me that the patient voids a 2-3 times a night. His urine flow and stream have been weak. He has not tried any prostate medication. For several years he has been told that there is a prostate nodule. Due to the ongoing issue PCP referred here for evaluation. PSA blood work has been normal.    PSA 2020 was 0.8  PSA  was 0.9  PSA  is 1.1 in . There is no family history of prostate cancer or prostate issues. RAE in : 1+ enlarged prostate there is nodule felt on the left side. No nontender  He was started on tamsulosin in . His granddaughter is  today. His stream is better but he still gets up 2-4 x/night. Past Medical History:   Diagnosis Date    Diabetes mellitus ([facility] [address]     High cholesterol     History of shingles aug 2015    Hypertension      No past surgical history on file. Current Outpatient Medications   Medication Sig Dispense Refill    naproxen (NAPROSYN) 375 MG tablet Take 1 tablet by mouth 2 times daily (with meals) 45 tablet 1    Dulaglutide (TRULICITY) 3 PZ/0.4AC SOPN Inject 3 mg into the skin every 7 days 12 Adjustable Dose Pre-filled Pen Syringe 3    metFORMIN (GLUCOPHAGE) 1000 MG tablet Take 1 tablet by mouth 2 times daily 180 tablet 1    nateglinide (STARLIX) 60 MG tablet Take 1 tablet by mouth 3 times daily (before meals) 270 tablet 1    pravastatin (PRAVACHOL) 80 MG tablet Take 1 tablet by mouth daily 90 tablet 1    valsartan (DIOVAN) 320 MG tablet Take 1 tablet by mouth daily 90 tablet 1    Accu-Chek FastClix Lancets MISC Check glucose once daily.  DX: E11.65 100 each 1    Blood Glucose Monitoring Suppl (ACCU-CHEK GUIDE) w/Device KIT Check glucose once daily. DX: E11.65 1 kit 0    blood glucose test strips (ACCU-CHEK GUIDE) strip Check glucose once daily. DX: E11.65 100 each 1    tamsulosin (FLOMAX) 0.4 MG capsule Take 1 capsule by mouth every evening (Patient not taking: Reported on 2023) 30 capsule 11    naproxen (NAPROSYN) 375 MG tablet Take 1 tablet by mouth 2 times daily (with meals) 45 tablet 1    diclofenac sodium (VOLTAREN) 1 % GEL Apply 2 g topically 4 times daily 150 g 5     No current facility-administered medications for this visit. Allergies   Allergen Reactions    Aspirin      Social History     Socioeconomic History    Marital status:      Spouse name: Not on file    Number of children: Not on file    Years of education: Not on file    Highest education level: Not on file   Occupational History    Not on file   Tobacco Use    Smoking status: Former     Packs/day: 1.00     Years: 16.00     Pack years: 16.00     Types: Cigarettes     Quit date: 1980     Years since quittin.0    Smokeless tobacco: Never   Substance and Sexual Activity    Alcohol use: Yes     Alcohol/week: 0.0 standard drinks    Drug use: Not on file    Sexual activity: Not on file   Other Topics Concern    Not on file   Social History Narrative    Not on file     Social Determinants of Health     Financial Resource Strain: Not on file   Food Insecurity: Not on file   Transportation Needs: Not on file   Physical Activity: Insufficiently Active    Days of Exercise per Week: 4 days    Minutes of Exercise per Session: 20 min   Stress: Not on file   Social Connections: Not on file   Intimate Partner Violence: Not on file   Housing Stability: Not on file     No family history on file. Review of Systems  Constitutional:   Negative for fever. GI:  Negative for nausea. Genitourinary: Positive for nocturia. There were no vitals taken for this visit.   PE: RAE: moderate anal stenosis, prostate not enlarged, prominent 1 cm nodule at left mid gland. Urinalysis  UA - Dipstick  No results found for this or any previous visit. UA - Micro  WBC - 0  RBC - 0  Bacteria - 0  Epith - 0    Physical Exam    Assessment and Plan    ICD-10-CM    1. Prostate nodule  N40.2 MRI PELVIS W WO CONTRAST      2. Benign prostatic hyperplasia without lower urinary tract symptoms  N40.0           Orders Placed This Encounter   Procedures    MRI PELVIS W WO CONTRAST     Standing Status:   Future     Standing Expiration Date:   1/18/2024     Order Specific Question:   STAT Creatinine as needed:     Answer:   No     Order Specific Question:   Reason for exam:     Answer:   prostate nodule     Will get mRI  Call patient with results    Follow-up and Dispositions    Return for call patient to arrange follow-up. negative...

## 2023-03-10 ENCOUNTER — NON-APPOINTMENT (OUTPATIENT)
Age: 28
End: 2023-03-10

## 2023-03-10 ENCOUNTER — APPOINTMENT (OUTPATIENT)
Dept: OPHTHALMOLOGY | Facility: CLINIC | Age: 28
End: 2023-03-10
Payer: OTHER GOVERNMENT

## 2023-03-10 PROCEDURE — 99072 ADDL SUPL MATRL&STAF TM PHE: CPT

## 2023-03-10 PROCEDURE — 99205 OFFICE O/P NEW HI 60 MIN: CPT

## 2023-03-16 NOTE — H&P ADULT - PROBLEM SELECTOR PROBLEM 4
[FreeTextEntry1] : The patient's blood pressure readings tended to increase after an episode of COVID in the fall 2022.  Consequently his beta-blocker was increased originally metoprolol ER 50 mg.  However the patient finds that his results are better with metoprolol tartrate which she is currently taking at a dosage of 25 mg twice daily\par \par The cardiorespiratory examination today is normal\par \par The electrocardiogram is normal\par \par I believe the cardiovascular status is stable.  We cannot increase the antihypertensive medications because of the risk of dropping the pressure too precipitously.  Therefore the current medications will be continued.\par Patient had an incident of tachycardia and hypertension several months ago.  I have advised him to immediately take an extra metoprolol tartrate if this pattern should repeat itself.\par \par Await the results of the patient's ongoing neurologic and endocrine evaluations.
Prophylactic measure

## 2023-06-22 NOTE — ED PROVIDER NOTE - MEDICAL DECISION MAKING DETAILS
oral aware elevated cpk tolerating fluids cont depakote f.u neuro return to ed for intractable HA, persistent vomiting, or new onset motor/sensory deficits f/u chornic cpk elevation pcp pt agrees to plan of care

## 2023-10-05 NOTE — ED ADULT NURSE NOTE - BREATH SOUNDS, MLM
left transverse/sigmoid sinus thrombosis  severe left ICA terminus stenosis and left MCA infarcts    Angio 9/29: left ICA terminus thrombus.  8 mg integrelin given to Left ICA and Integrilin drip started at 1 mcg/kg/min    NEURO:  AIS  etiology: cardioembolic versus hypercoagulable state   MRI wo WITH left watershed infarcts, patient s/p mechanical thrombectomy of L ICA terminus chronic clot with complete reperfusion. Small residual non occlusive PCOMM thrombus  S/P integrilin now on ASA 81 mg QD  CTH with post op heme vs. contrast extravasation- follow up scan in the 10/1  Partial venous sinus thrombosis involving the left transverse and sigmoid sinuses: was started on heparin/eliquis but held due to hematuria   Pain: tylenol PRN   acute rehab    CV:  -160mmHg  no antiplatelet on Integrilin  Loop recorder placed 3 weeks ago  TTE with PFO; EF 45 to 50%   Lipid: 49  Continue Lasix 20mg po, spironolactone, Metoprolol     PULM:  RA  Bibasilar atelectasis   Incentive spirometry   Mobilize as much as possible    RENAL:  ARIANA   - stop lasix  - monitor cre  - avoid nephrotoxins  - start iv fluids  - Salgado replaced, hematuria resolving - likely 2/2 trauma  - no evidence of UTI      GI:  Diet: Continue diet  senna and miralax for bowel regimen  Last Bowel Movement: PTA, mag citrate added   GI prophylaxis [] not indicated [x] PPI [] other:    ENDO:   A1c 5.9; Follow TSH    HEME/ONC:  H/H stable  VTE prophylaxis: [x] SCDs [x] chemoprophylaxis- start chemoprophylaxis after CTH in the AM  [] hold chemoprophylaxis due to: [] high risk of DVT/PE on admission due to:  LE doppler    ID:  afebrile, elevated leukocyte count  Continue to monitor     Daily labs    CODE STATUS:  [x] Full Code [] DNR [] DNI [] Palliative/Comfort Care   Clear

## 2023-10-11 NOTE — ED ADULT NURSE NOTE - NS ED NURSE REPORT GIVEN TO FT
Pt informed of results and plan of care. Verbalized understanding and denies any concern.        Melinda

## 2023-10-30 NOTE — ED ADULT NURSE NOTE - DOES PATIENT HAVE ADVANCE DIRECTIVE
Problem: Wound:  Goal: Will show signs of wound healing; wound closure and no evidence of infection  Description: Will show signs of wound healing; wound closure and no evidence of infection  Outcome: Progressing   Seen today for right leg wound. See avs for discharge. Care plan reviewed with patient. Patient verbalize understanding of the plan of care and contribute to goal setting. No

## 2023-11-14 NOTE — ED PROVIDER NOTE - DISCHARGE DATE
13-Sep-2021 Asc Procedure Text (A): After obtaining clear surgical margins the patient was sent to an ASC for surgical repair.  The patient understands they will receive post-surgical care and follow-up from the ASC physician.

## 2023-11-30 NOTE — ED ADULT TRIAGE NOTE - CADM TRG TX PRIOR TO ARRIVAL
[facility] URGENT CARE COURSE:    Casimiro was seen today for penis/scrotum problem.    Diagnoses and all orders for this visit:    Lump in scrotum  -     Cancel: US TESTICLES AND SCROTUM; Future  -     US TESTICLES AND SCROTUM; Future  -     SERVICE TO UROLOGY               Plan:  Recommend plenty of fluids, hydration orally. . Rest. Avoid undue exertion. Watch for any increasing/worsening symptoms.  Recommendation to follow with the primary care physician in next 1-7 days. If the symptoms continues or  worsen contact primary care physician or  recommend patient go to the emergency room.     Dr. Maida Hobbs M.D.  [facility]  [address] [address] [place] [address]  Telephone:  [phone]       
none

## 2023-12-11 NOTE — ED ADULT NURSE NOTE - NS PRO AD NO ADVANCE DIRECTIVE
Pt came in for 2 week BP check per Dr. Oscar Silva. Pt stopped HCTZ, started Losartan 100 mg daily, and increased Metoprolol 25 mg to BID. Pt states Losartan has been giving her bad headaches, dry mouth and swelling in ankles and hands. Pt states she stopped taking the Losartan on Friday. States she has been taking the Metoprolol BID. States she is still having headaches but they have gotten better since stopping Losartan. BP today is 164/108, HR 92. Second check BP is 152/108. Pt states she has been keeping up with it at home and BP has not improved much. Per Dr. Oscar Silva- Start Spironolactone 25 mg daily. Check BMP and continue taking Metoprolol 25 mg BID. D/C losartan 100 mg. Forwarding Rx to Dr. Oscar Silva for signature.
No

## 2024-01-23 NOTE — ED PROVIDER NOTE - FAMILY HISTORY
Tylenol comes in 325 mg and 500 mg strengths.  You can use 3x 325 mg or 2x 500 mg up to 4 times daily.  At regular doses it reduces pain; at maximum doses it has some anti-inflammatory (anti-swelling) effects. Too much Tylenol is harmful to the liver; never take more than 4 g (4000 mg) in 24 hours.    Ibuprofen comes in 200 mg tablets.  It reduces inflammation (swelling) to decrease pain.  You can use 2-4 tablets 3-4 times daily.  Always take it with food so that it does not hurt your stomach.     It is safe to take Tylenol and ibuprofen together. Some people prefer to stagger them so that as one medication is wearing off the other one is near it's peak effectiveness.    
Family history unknown, Patient is adapted.

## 2024-05-30 NOTE — ED ADULT TRIAGE NOTE - NS ED NOTE AC HIGH RISK COUNTRIES
Patient here today with mother.  She states that patient vomited x 1 day.  Patient denies any abdominal pain.     No

## 2024-09-17 NOTE — ED ADULT NURSE NOTE - ALCOHOL PRE SCREEN (AUDIT - C)
Problem: Knowledge Deficit  Goal: Patient/family/caregiver demonstrates understanding of disease process, treatment plan, medications, and discharge instructions  Description: Complete learning assessment and assess knowledge base.  Interventions:  - Provide teaching at level of understanding  - Provide teaching via preferred learning methods  Outcome: Progressing      Statement Selected

## 2024-10-23 NOTE — ED PROVIDER NOTE - TIMING
or lymphadenopathy noted.     Left Breast: Scarring from recent breast reduction seen. no rashes or skin changes noted bilaterally. Breast symmetric. No tenderness noted. No masses or lymphadenopathy noted.     Discussed above findings with patient and recommended collecting Prolactin level for which she is agreeable.    -Prolactin collected    -Recommended continuing to monitor and when she RTO in 3 months for annual exam inform Dr. Jackson about symptoms at that time.      Diagnosis Orders   1. Abnormal uterine bleeding  TSH with Reflex    T4, Free    T3, Free    HCG, Quantitative, Pregnancy    NuSwab BV and Candida, LUKAS    NuSwab BV and Candida, LUKAS      2. Discharge from right nipple        3. Hyperthyroidism  TSH with Reflex    T4, Free    T3, Free          No problem-specific Assessment & Plan notes found for this encounter.         Orders Placed This Encounter   Procedures    NuSwab BV and Candida, LUKAS     Standing Status:   Future     Number of Occurrences:   1     Standing Expiration Date:   10/23/2025    TSH with Reflex     Standing Status:   Future     Number of Occurrences:   1     Standing Expiration Date:   10/23/2025    T4, Free     Standing Status:   Future     Number of Occurrences:   1     Standing Expiration Date:   10/23/2025    T3, Free     Standing Status:   Future     Number of Occurrences:   1     Standing Expiration Date:   10/23/2025    HCG, Quantitative, Pregnancy     Standing Status:   Future     Number of Occurrences:   1     Standing Expiration Date:   10/23/2025           LUISA Amanda - NP     gradual onset

## 2024-11-06 NOTE — ED PROVIDER NOTE - NS ED MD DISPO ISOLATION TYPES
Occupational Therapy Visit    Visit Type: Daily Treatment Note  Visit: 3  Referring Provider: Rubin Cedillo MD  Medical Diagnosis (from order): Z98.890 - S/P arthroscopy of left shoulder     SUBJECTIVE                                                                                                               \"Im doing okay, I've been sore all weekend and week. I sneezed and felt that I pulled something in my neck\" \"Slight relief with aleve\"     Pain / Symptoms  - Pain rating (out of 10): Current: 3      OBJECTIVE                                                                                                                                  Treatment    Ultrasound  - Location: left shoulder  - Position: sitting  - Duty Cycle: 100%  - Frequency: 3 Mhz  - Intensity (w/cm2): 1.3     - Intensity: patient tolerance  - Duration: 7 minutes    Results: decreased pain and improved range of motion  Reaction: no adverse reaction to treatment      Therapeutic Exercise  Table slides: 2 sets of 15 reps for warm up  - flexion  - scaption: pulls more at her neck in this motion     Body blade: 2 sets of 30 seconds   - 0 degrees of flexion: 30 seconds   - 45 degrees of abduction: 30 seconds     Isometrics:  - flexion  - extension  - abduction  - internal rotation  - external rotation     Manual Therapy   Cupping provided to her scalenes, deltoid, rotator cuff, and biceps     Home Exercise Program  Access Code: [email or web address]  URL: [email or web address]  Date: 11/06/2024  Prepared by: Comfort Meehan    Exercises  - Seated Shoulder Flexion Towel Slide at Table Top  - 3 x daily - 7 x weekly - 10 reps - 10 hold  - Seated Shoulder Scaption Slide at Table Top with Forearm in Neutral  - 3 x daily - 7 x weekly - 10 reps - 10 hold  - Seated Scapular Retraction  - 3 x daily - 7 x weekly - 10 reps  - Seated Shoulder Shrugs  - 2 x daily - 7 x weekly - 10 reps  - Seated Shoulder Shrug Circles AROM Backward  - 2 x daily - 7 x  weekly - 10 reps  - Standing Shoulder Internal Rotation Stretch Behind Back  - 2 x daily - 7 x weekly - 10 reps - 10 hold  - Standing Shoulder External Rotation Stretch in Doorway  - 2 x daily - 7 x weekly - 10 reps - 10 hold  - Isometric Shoulder Flexion at Wall  - 2 x daily - 7 x weekly - 10 reps - 5 hold  - Isometric Shoulder Extension at Wall  - 2 x daily - 7 x weekly - 10 reps - 5 hold  - Standing Isometric Shoulder Abduction with Doorway - Arm Bent  - 2 x daily - 7 x weekly - 10 reps - 5 hold  - Standing Isometric Shoulder Internal Rotation with Towel Roll at Doorway  - 2 x daily - 7 x weekly - 10 reps - 5 hold  - Standing Isometric Shoulder External Rotation with Doorway and Towel Roll  - 2 x daily - 7 x weekly - 10 reps - 5 hold      ASSESSMENT                                                                                                            Patient was seen today to work on increasing range and strength in her left shoulder. There were minimal improvements seen in this session due to patient having a little set back over the weekend and feeling like she pulled a muscle into her neck. Patient reported taking a break from her exercises since Saturday this weekend because of the pain and limitations she was having with her neck. After this session patient is reporting feeling more loose and being able to move more in her shoulder. Remaining deficits in strength and pain. However, patient was able to begin strengthening this session, will continue to progress per protocols. Skilled Occupational Therapy is still necessary to address current functional limitations and furthering independence with self cares and meaningful occupations.    PLAN                                                                                                                           Suggestions for next session as indicated: Progress per plan of care       Therapy procedure time and total treatment time can be found  documented on the Time Entry flowsheet   None

## 2024-12-03 NOTE — ED ADULT NURSE NOTE - WITNESSED BY
"Chief Complaint   Patient presents with    Cough     Right ear hurts       Initial Pulse 113   Temp 97  F (36.1  C) (Tympanic)   Resp 20   Ht 3' 11.25\" (1.2 m)   Wt (!) 89 lb 14.4 oz (40.8 kg)   SpO2 98%   BMI 28.31 kg/m   Estimated body mass index is 28.31 kg/m  as calculated from the following:    Height as of this encounter: 3' 11.25\" (1.2 m).    Weight as of this encounter: 89 lb 14.4 oz (40.8 kg).  Medication Review: complete    The next two questions are to help us understand your food security.  If you are feeling you need any assistance in this area, we have resources available to support you today.          10/14/2024   SDOH- Food Insecurity   Within the past 12 months, did you worry that your food would run out before you got money to buy more? N    Within the past 12 months, did the food you bought just not last and you didn t have money to get more? N        Patient-reported         Jamia Salas CNA      " EMS/significant other

## 2024-12-12 NOTE — ED ADULT NURSE NOTE - PAIN: PRESENCE, MLM
pt BIBA s/p MVC, restrained , +
denies pain/discomfort

## 2025-03-20 NOTE — PHYSICAL EXAM
Detail Level: Detailed
[FreeTextEntry1] : GENERAL PHYSICAL EXAM:\par GEN: no distress, normal affect\par HEENT: NCAT, OP clear\par EYES: sclera white, conjunctiva clear, no nystagmus\par NECK: supple\par CV: RRR    		\par PULM: CTAB, no wheezing\par GI: soft ABD, +BS, NT, ND\par EXT: peripheral pulse intact, no cyanosis\par MSK: muscle tone and strength normal\par SKIN: warm, dry, no rash or lesion on exposed skin \par \par NEUROLOGICAL EXAM:\par Mental Status\par Orientation: alert and oriented to person, place, time, and situation \par Language: clear and fluent\par \par Cranial Nerves\par II: full visual fields intact \par III, IV, VI: PERRL, EOMI\par V, VII: facial sensation and movement intact and symmetric \par VIII: hearing intact \par IX, X: uvula midline, soft palate elevates normally \par XI: BL shoulder shrug intact \par XII: tongue midline\par \par Motor\par Shoulder abd: 5 (R), 5 (L)\par EF/EE: 5 (R), 5 (L)\par WF/WE: 5 (R), 5 (L)\par hand : 5 (R), 5 (L)\par HF/HE: 5 (R), 5 (L)\par KF/KE: 5 (R), 5 (L)\par DF/PF: 5 (R), 5 (L)                \par Tone and bulk are normal in upper and lower limbs\par No pronator drift\par \par Sensation\par Intact to light touch and pinprick in all 4 EXTs\par \par Reflex\par 2+ in BL biceps, triceps, brachioradialis, patella, ankle                                    \par Plantar responses downward bilaterally\par \par Coordination\par Normal FTN bilaterally\par \par Gait\par Normal stance, stride, and pivot turn\par Tandem walk intact\par Negative Romberg\par \par